# Patient Record
Sex: FEMALE | Race: WHITE | HISPANIC OR LATINO | URBAN - METROPOLITAN AREA
[De-identification: names, ages, dates, MRNs, and addresses within clinical notes are randomized per-mention and may not be internally consistent; named-entity substitution may affect disease eponyms.]

---

## 2017-06-13 ENCOUNTER — INPATIENT (INPATIENT)
Facility: HOSPITAL | Age: 67
LOS: 5 days | Discharge: NURSING, REHAB OR LONG-TERM CARE | End: 2017-06-19
Attending: INTERNAL MEDICINE

## 2017-06-13 DIAGNOSIS — R42 DIZZINESS AND GIDDINESS: ICD-10-CM

## 2017-06-20 ENCOUNTER — OUTPATIENT (OUTPATIENT)
Dept: OUTPATIENT SERVICES | Facility: HOSPITAL | Age: 67
LOS: 1 days | Discharge: HOME | End: 2017-06-20

## 2017-06-20 DIAGNOSIS — R42 DIZZINESS AND GIDDINESS: ICD-10-CM

## 2017-06-21 ENCOUNTER — OUTPATIENT (OUTPATIENT)
Dept: OUTPATIENT SERVICES | Facility: HOSPITAL | Age: 67
LOS: 1 days | Discharge: HOME | End: 2017-06-21

## 2017-06-21 DIAGNOSIS — R42 DIZZINESS AND GIDDINESS: ICD-10-CM

## 2017-06-28 DIAGNOSIS — R63.4 ABNORMAL WEIGHT LOSS: ICD-10-CM

## 2017-06-28 DIAGNOSIS — Z00.00 ENCOUNTER FOR GENERAL ADULT MEDICAL EXAMINATION WITHOUT ABNORMAL FINDINGS: ICD-10-CM

## 2017-07-03 DIAGNOSIS — R26.0 ATAXIC GAIT: ICD-10-CM

## 2017-07-03 DIAGNOSIS — Z98.51 TUBAL LIGATION STATUS: ICD-10-CM

## 2017-07-03 DIAGNOSIS — R26.9 UNSPECIFIED ABNORMALITIES OF GAIT AND MOBILITY: ICD-10-CM

## 2017-07-03 DIAGNOSIS — G93.89 OTHER SPECIFIED DISORDERS OF BRAIN: ICD-10-CM

## 2017-07-03 DIAGNOSIS — R26.2 DIFFICULTY IN WALKING, NOT ELSEWHERE CLASSIFIED: ICD-10-CM

## 2017-07-03 DIAGNOSIS — R00.2 PALPITATIONS: ICD-10-CM

## 2017-07-03 DIAGNOSIS — M48.02 SPINAL STENOSIS, CERVICAL REGION: ICD-10-CM

## 2017-07-03 DIAGNOSIS — M62.838 OTHER MUSCLE SPASM: ICD-10-CM

## 2017-07-03 DIAGNOSIS — R29.6 REPEATED FALLS: ICD-10-CM

## 2017-07-03 DIAGNOSIS — R47.1 DYSARTHRIA AND ANARTHRIA: ICD-10-CM

## 2017-07-03 DIAGNOSIS — R27.8 OTHER LACK OF COORDINATION: ICD-10-CM

## 2017-07-03 DIAGNOSIS — F17.200 NICOTINE DEPENDENCE, UNSPECIFIED, UNCOMPLICATED: ICD-10-CM

## 2017-07-03 DIAGNOSIS — I10 ESSENTIAL (PRIMARY) HYPERTENSION: ICD-10-CM

## 2017-07-03 DIAGNOSIS — F32.9 MAJOR DEPRESSIVE DISORDER, SINGLE EPISODE, UNSPECIFIED: ICD-10-CM

## 2017-07-03 DIAGNOSIS — R07.89 OTHER CHEST PAIN: ICD-10-CM

## 2018-08-24 ENCOUNTER — APPOINTMENT (OUTPATIENT)
Dept: UROLOGY | Facility: CLINIC | Age: 68
End: 2018-08-24
Payer: MEDICARE

## 2018-08-24 VITALS
WEIGHT: 175 LBS | DIASTOLIC BLOOD PRESSURE: 69 MMHG | HEIGHT: 61 IN | BODY MASS INDEX: 33.04 KG/M2 | HEART RATE: 88 BPM | SYSTOLIC BLOOD PRESSURE: 117 MMHG

## 2018-08-24 DIAGNOSIS — R39.15 URGENCY OF URINATION: ICD-10-CM

## 2018-08-24 DIAGNOSIS — R32 UNSPECIFIED URINARY INCONTINENCE: ICD-10-CM

## 2018-08-24 DIAGNOSIS — Z87.448 PERSONAL HISTORY OF OTHER DISEASES OF URINARY SYSTEM: ICD-10-CM

## 2018-08-24 DIAGNOSIS — R35.1 NOCTURIA: ICD-10-CM

## 2018-08-24 DIAGNOSIS — Z86.73 PERSONAL HISTORY OF TRANSIENT ISCHEMIC ATTACK (TIA), AND CEREBRAL INFARCTION W/OUT RESIDUAL DEFICITS: ICD-10-CM

## 2018-08-24 DIAGNOSIS — R33.9 RETENTION OF URINE, UNSPECIFIED: ICD-10-CM

## 2018-08-24 DIAGNOSIS — R39.13 SPLITTING OF URINARY STREAM: ICD-10-CM

## 2018-08-24 DIAGNOSIS — Z82.3 FAMILY HISTORY OF STROKE: ICD-10-CM

## 2018-08-24 DIAGNOSIS — Z82.49 FAMILY HISTORY OF ISCHEMIC HEART DISEASE AND OTHER DISEASES OF THE CIRCULATORY SYSTEM: ICD-10-CM

## 2018-08-24 DIAGNOSIS — R35.0 FREQUENCY OF MICTURITION: ICD-10-CM

## 2018-08-24 DIAGNOSIS — F17.200 NICOTINE DEPENDENCE, UNSPECIFIED, UNCOMPLICATED: ICD-10-CM

## 2018-08-24 PROCEDURE — 99203 OFFICE O/P NEW LOW 30 MIN: CPT

## 2018-08-24 RX ORDER — LISINOPRIL 20 MG/1
20 TABLET ORAL
Refills: 0 | Status: ACTIVE | COMMUNITY

## 2018-08-24 RX ORDER — PAROXETINE HYDROCHLORIDE 40 MG/1
40 TABLET, FILM COATED ORAL
Refills: 0 | Status: ACTIVE | COMMUNITY

## 2018-08-24 RX ORDER — ATORVASTATIN CALCIUM 80 MG/1
80 TABLET, FILM COATED ORAL
Refills: 0 | Status: ACTIVE | COMMUNITY

## 2018-08-24 NOTE — LETTER BODY
[Dear  ___] : Dear  [unfilled], [Consult Letter:] : I had the pleasure of evaluating your patient, [unfilled]. [Please see my note below.] : Please see my note below. [Consult Closing:] : Thank you very much for allowing me to participate in the care of this patient.  If you have any questions, please do not hesitate to contact me. [FreeTextEntry2] : Owen Anaya MD\par [facility] [address]\[place] [address]\par

## 2018-08-24 NOTE — PHYSICAL EXAM
[General Appearance - Well Developed] : well developed [General Appearance - Well Nourished] : well nourished [Normal Appearance] : normal appearance [Well Groomed] : well groomed [General Appearance - In No Acute Distress] : no acute distress [Heart Rate And Rhythm] : Heart rate and rhythm were normal [] : no respiratory distress [Respiration, Rhythm And Depth] : normal respiratory rhythm and effort [Exaggerated Use Of Accessory Muscles For Inspiration] : no accessory muscle use [Auscultation Breath Sounds / Voice Sounds] : lungs were clear to auscultation bilaterally [Abdomen Soft] : soft [Abdomen Tenderness] : non-tender [Costovertebral Angle Tenderness] : no ~M costovertebral angle tenderness [FreeTextEntry1] : weak right side, slow speech and mentation [Oriented To Time, Place, And Person] : oriented to person, place, and time [Affect] : the affect was normal [Mood] : the mood was normal [Not Anxious] : not anxious

## 2018-08-24 NOTE — HISTORY OF PRESENT ILLNESS
[FreeTextEntry1] : Allyn is a 67-year-old female who is been having mini strokes of the last five years. For at least a year she’s been having severe lower urinary tract symptoms with incomplete emptying, frequency, intermittency, urgency, slow stream, straining, all equal to a five waking up at least four times per night this is made a very unhappy. This gives her an AUA symptom score of 25/4/5. She’s of the janneth per void is small and that she definitely knows the urine is about to come out but either due to instability and/or her limited mobility because of her right-sided weakness she just can’t get to the bathroom. She is not spoken to anyone about this before except her gynecologist who she saw several months ago. At that point she was told the pelvic floor looks normal and that she should see a urologist.\par \par She has comorbidities with a stroke as the main issue. She is  “she wouldn’t tell me”, para three some assuming at some point she has some terminations of pregnancy or just decided not to discuss it. They were all vaginal deliveries and she said various surgeries none of which should be impinging on her urinary abilities or lack thereof.\par  [Urinary Incontinence] : urinary incontinence [Urinary Urgency] : urinary urgency [Urinary Frequency] : urinary frequency [Nocturia] : nocturia [Straining] : straining [Weak Stream] : weak stream [Intermittency] : intermittency [Dysuria] : no dysuria [Hematuria - Gross] : no gross hematuria [Hematuria - Microscopic] : no microscopic hematuria

## 2018-08-24 NOTE — LETTER HEADER
[FreeTextEntry3] : Teo Rhodes M.D.\par Director of Urology\par [facility]\par [address] [place] [address], [address]\[place] [address]

## 2018-08-24 NOTE — ASSESSMENT
[FreeTextEntry1] : Her exam shows her mentation to be slightly slowed. Her speech is clear but her responses are slowed. She had severe trouble getting up on the table due to right-sided weakness. There is no palpable bladder she has mild peripheral edema and I don’t know whether this is poor bladder function, overactive bladder, dyssynergia, or a combination thereof. Going to want to record of her intake and output which might be difficult but perhaps possible with the top-end and some help from her friends. Going to what urine for analysis culture and cytology again a clean-catch may be difficult but will see what we get if necessary do a straight cath. Will send it for a renal and bladder ultrasound to make sure that she is emptying well enough and that there are no signs of obstructive uropathy when she comes in we review the data and consider a flow study. I may consider referral to Dr. Joanie Grant if the diagnosis is not obvious and if she may have pelvic floor dysfunction and possibly need something along the lines of nerve stimulation she tells me she has glaucoma she’s not sure if it’s never open and if it is narrow anticholinergics would be relatively contraindicated

## 2018-08-30 ENCOUNTER — TRANSCRIPTION ENCOUNTER (OUTPATIENT)
Age: 68
End: 2018-08-30

## 2018-09-12 ENCOUNTER — APPOINTMENT (OUTPATIENT)
Dept: UROLOGY | Facility: CLINIC | Age: 68
End: 2018-09-12

## 2018-09-26 ENCOUNTER — APPOINTMENT (OUTPATIENT)
Dept: UROLOGY | Facility: CLINIC | Age: 68
End: 2018-09-26

## 2019-01-11 ENCOUNTER — OUTPATIENT (OUTPATIENT)
Dept: OUTPATIENT SERVICES | Facility: HOSPITAL | Age: 69
LOS: 1 days | Discharge: HOME | End: 2019-01-11

## 2019-01-11 DIAGNOSIS — N39.0 URINARY TRACT INFECTION, SITE NOT SPECIFIED: ICD-10-CM

## 2019-01-28 ENCOUNTER — OUTPATIENT (OUTPATIENT)
Dept: OUTPATIENT SERVICES | Facility: HOSPITAL | Age: 69
LOS: 1 days | Discharge: HOME | End: 2019-01-28

## 2019-01-28 DIAGNOSIS — R79.9 ABNORMAL FINDING OF BLOOD CHEMISTRY, UNSPECIFIED: ICD-10-CM

## 2019-01-28 DIAGNOSIS — N39.0 URINARY TRACT INFECTION, SITE NOT SPECIFIED: ICD-10-CM

## 2019-01-28 DIAGNOSIS — D64.9 ANEMIA, UNSPECIFIED: ICD-10-CM

## 2020-01-21 ENCOUNTER — INPATIENT (INPATIENT)
Facility: HOSPITAL | Age: 70
LOS: 9 days | Discharge: NURSING, REHAB OR LONG-TERM CARE | End: 2020-01-31
Attending: HOSPITALIST | Admitting: HOSPITALIST
Payer: MEDICARE

## 2020-01-21 VITALS
TEMPERATURE: 98 F | RESPIRATION RATE: 16 BRPM | DIASTOLIC BLOOD PRESSURE: 71 MMHG | SYSTOLIC BLOOD PRESSURE: 137 MMHG | HEART RATE: 90 BPM | OXYGEN SATURATION: 99 %

## 2020-01-21 LAB
ALBUMIN SERPL ELPH-MCNC: 4.7 G/DL — SIGNIFICANT CHANGE UP (ref 3.5–5.2)
ALP SERPL-CCNC: 107 U/L — SIGNIFICANT CHANGE UP (ref 30–115)
ALT FLD-CCNC: 16 U/L — SIGNIFICANT CHANGE UP (ref 0–41)
ANION GAP SERPL CALC-SCNC: 15 MMOL/L — HIGH (ref 7–14)
APPEARANCE UR: CLEAR — SIGNIFICANT CHANGE UP
APTT BLD: 30 SEC — SIGNIFICANT CHANGE UP (ref 27–39.2)
AST SERPL-CCNC: 21 U/L — SIGNIFICANT CHANGE UP (ref 0–41)
BACTERIA # UR AUTO: ABNORMAL
BASOPHILS # BLD AUTO: 0.03 K/UL — SIGNIFICANT CHANGE UP (ref 0–0.2)
BASOPHILS NFR BLD AUTO: 0.4 % — SIGNIFICANT CHANGE UP (ref 0–1)
BILIRUB SERPL-MCNC: 0.6 MG/DL — SIGNIFICANT CHANGE UP (ref 0.2–1.2)
BILIRUB UR-MCNC: NEGATIVE — SIGNIFICANT CHANGE UP
BUN SERPL-MCNC: 24 MG/DL — HIGH (ref 10–20)
CALCIUM SERPL-MCNC: 10.1 MG/DL — SIGNIFICANT CHANGE UP (ref 8.5–10.1)
CHLORIDE SERPL-SCNC: 98 MMOL/L — SIGNIFICANT CHANGE UP (ref 98–110)
CO2 SERPL-SCNC: 26 MMOL/L — SIGNIFICANT CHANGE UP (ref 17–32)
COLOR SPEC: YELLOW — SIGNIFICANT CHANGE UP
CREAT SERPL-MCNC: 1 MG/DL — SIGNIFICANT CHANGE UP (ref 0.7–1.5)
DIFF PNL FLD: NEGATIVE — SIGNIFICANT CHANGE UP
EOSINOPHIL # BLD AUTO: 0.16 K/UL — SIGNIFICANT CHANGE UP (ref 0–0.7)
EOSINOPHIL NFR BLD AUTO: 2.1 % — SIGNIFICANT CHANGE UP (ref 0–8)
EPI CELLS # UR: 0 /HPF — SIGNIFICANT CHANGE UP (ref 0–5)
GLUCOSE SERPL-MCNC: 103 MG/DL — HIGH (ref 70–99)
GLUCOSE UR QL: NEGATIVE — SIGNIFICANT CHANGE UP
HCT VFR BLD CALC: 38.7 % — SIGNIFICANT CHANGE UP (ref 37–47)
HGB BLD-MCNC: 13 G/DL — SIGNIFICANT CHANGE UP (ref 12–16)
HYALINE CASTS # UR AUTO: 0 /LPF — SIGNIFICANT CHANGE UP (ref 0–7)
IMM GRANULOCYTES NFR BLD AUTO: 0.4 % — HIGH (ref 0.1–0.3)
INR BLD: 1.03 RATIO — SIGNIFICANT CHANGE UP (ref 0.65–1.3)
KETONES UR-MCNC: NEGATIVE — SIGNIFICANT CHANGE UP
LEUKOCYTE ESTERASE UR-ACNC: ABNORMAL
LYMPHOCYTES # BLD AUTO: 2.15 K/UL — SIGNIFICANT CHANGE UP (ref 1.2–3.4)
LYMPHOCYTES # BLD AUTO: 28.3 % — SIGNIFICANT CHANGE UP (ref 20.5–51.1)
MCHC RBC-ENTMCNC: 31.3 PG — HIGH (ref 27–31)
MCHC RBC-ENTMCNC: 33.6 G/DL — SIGNIFICANT CHANGE UP (ref 32–37)
MCV RBC AUTO: 93 FL — SIGNIFICANT CHANGE UP (ref 81–99)
MONOCYTES # BLD AUTO: 0.59 K/UL — SIGNIFICANT CHANGE UP (ref 0.1–0.6)
MONOCYTES NFR BLD AUTO: 7.8 % — SIGNIFICANT CHANGE UP (ref 1.7–9.3)
NEUTROPHILS # BLD AUTO: 4.65 K/UL — SIGNIFICANT CHANGE UP (ref 1.4–6.5)
NEUTROPHILS NFR BLD AUTO: 61 % — SIGNIFICANT CHANGE UP (ref 42.2–75.2)
NITRITE UR-MCNC: NEGATIVE — SIGNIFICANT CHANGE UP
NRBC # BLD: 0 /100 WBCS — SIGNIFICANT CHANGE UP (ref 0–0)
PH UR: 5.5 — SIGNIFICANT CHANGE UP (ref 5–8)
PLATELET # BLD AUTO: 295 K/UL — SIGNIFICANT CHANGE UP (ref 130–400)
POTASSIUM SERPL-MCNC: 4.4 MMOL/L — SIGNIFICANT CHANGE UP (ref 3.5–5)
POTASSIUM SERPL-SCNC: 4.4 MMOL/L — SIGNIFICANT CHANGE UP (ref 3.5–5)
PROT SERPL-MCNC: 7.6 G/DL — SIGNIFICANT CHANGE UP (ref 6–8)
PROT UR-MCNC: SIGNIFICANT CHANGE UP
PROTHROM AB SERPL-ACNC: 11.8 SEC — SIGNIFICANT CHANGE UP (ref 9.95–12.87)
RBC # BLD: 4.16 M/UL — LOW (ref 4.2–5.4)
RBC # FLD: 12.9 % — SIGNIFICANT CHANGE UP (ref 11.5–14.5)
RBC CASTS # UR COMP ASSIST: 0 /HPF — SIGNIFICANT CHANGE UP (ref 0–4)
SODIUM SERPL-SCNC: 139 MMOL/L — SIGNIFICANT CHANGE UP (ref 135–146)
SP GR SPEC: 1.02 — SIGNIFICANT CHANGE UP (ref 1.01–1.02)
UROBILINOGEN FLD QL: SIGNIFICANT CHANGE UP
WBC # BLD: 7.61 K/UL — SIGNIFICANT CHANGE UP (ref 4.8–10.8)
WBC # FLD AUTO: 7.61 K/UL — SIGNIFICANT CHANGE UP (ref 4.8–10.8)
WBC UR QL: 1 /HPF — SIGNIFICANT CHANGE UP (ref 0–5)

## 2020-01-21 PROCEDURE — 99285 EMERGENCY DEPT VISIT HI MDM: CPT

## 2020-01-21 PROCEDURE — 72125 CT NECK SPINE W/O DYE: CPT | Mod: 26

## 2020-01-21 PROCEDURE — 73610 X-RAY EXAM OF ANKLE: CPT | Mod: 26,RT

## 2020-01-21 PROCEDURE — 99283 EMERGENCY DEPT VISIT LOW MDM: CPT

## 2020-01-21 PROCEDURE — 70450 CT HEAD/BRAIN W/O DYE: CPT | Mod: 26

## 2020-01-21 PROCEDURE — 71260 CT THORAX DX C+: CPT | Mod: 26

## 2020-01-21 PROCEDURE — 74177 CT ABD & PELVIS W/CONTRAST: CPT | Mod: 26

## 2020-01-21 PROCEDURE — 73630 X-RAY EXAM OF FOOT: CPT | Mod: 26,RT

## 2020-01-21 RX ORDER — ACETAMINOPHEN 500 MG
975 TABLET ORAL ONCE
Refills: 0 | Status: COMPLETED | OUTPATIENT
Start: 2020-01-21 | End: 2020-01-21

## 2020-01-21 RX ORDER — SODIUM CHLORIDE 9 MG/ML
500 INJECTION INTRAMUSCULAR; INTRAVENOUS; SUBCUTANEOUS ONCE
Refills: 0 | Status: COMPLETED | OUTPATIENT
Start: 2020-01-21 | End: 2020-01-21

## 2020-01-21 RX ORDER — DIAZEPAM 5 MG
5 TABLET ORAL ONCE
Refills: 0 | Status: DISCONTINUED | OUTPATIENT
Start: 2020-01-21 | End: 2020-01-21

## 2020-01-21 RX ADMIN — Medication 975 MILLIGRAM(S): at 20:08

## 2020-01-21 RX ADMIN — SODIUM CHLORIDE 250 MILLILITER(S): 9 INJECTION INTRAMUSCULAR; INTRAVENOUS; SUBCUTANEOUS at 22:37

## 2020-01-21 RX ADMIN — Medication 5 MILLIGRAM(S): at 20:08

## 2020-01-21 NOTE — H&P ADULT - HISTORY OF PRESENT ILLNESS
69 female hx of HTN, DM, CVA with right sided weakness present c/o right sided body pain and unable to ambulate after fall one hour prior to presentation.   patient tried to get off the bed without her walker and she fell on her back. patient complains of back and right ankle pain. denies head injury and LOC. grand aughter witnessed the fall. she is refusing to walk after the fall, family brought her to ED.   Patient had history of multiple falls in the past but didn't come to hospital for evaluation.  As per family, patient used to have 24 hours aid at [facility] and moved back to [place] recently.  Denies Chest pain SOB nausea vomiting or urinary symptoms   In ED she was hemodynamically stable , trauma work up were all negative, admitted for difficulty ambulating

## 2020-01-21 NOTE — H&P ADULT - NSHPLABSRESULTS_GEN_ALL_CORE
13.0   7.61  )-----------( 295      ( 2020 20:05 )             38.7           139  |  98  |  24<H>  ----------------------------<  103<H>  4.4   |  26  |  1.0    Ca    10.1      2020 20:05    TPro  7.6  /  Alb  4.7  /  TBili  0.6  /  DBili  x   /  AST  21  /  ALT  16  /  AlkPhos  107                Urinalysis Basic - ( 2020 22:20 )    Color: Yellow / Appearance: Clear / S.022 / pH: x  Gluc: x / Ketone: Negative  / Bili: Negative / Urobili: <2 mg/dL   Blood: x / Protein: Trace / Nitrite: Negative   Leuk Esterase: Large / RBC: 0 /HPF / WBC 1 /HPF   Sq Epi: x / Non Sq Epi: 0 /HPF / Bacteria: Many        PT/INR - ( 2020 20:05 )   PT: 11.80 sec;   INR: 1.03 ratio         PTT - ( 2020 20:05 )  PTT:30.0 sec    Lactate Trend            CAPILLARY BLOOD GLUCOSE            < from: CT Abdomen and Pelvis w/ IV Cont (20 @ 21:35) >    IMPRESSION:   No evidence of acute traumatic intrathoracic or intra-abdominal pathology.    < end of copied text >    < from: CT Chest w/ IV Cont (20 @ 21:33) >    IMPRESSION:   No evidence of acute traumatic intrathoracic or intra-abdominal pathology.    < end of copied text >    < from: CT Cervical Spine No Cont (20 @ 21:32) >    No evidence of acute fracture or subluxation of the cervical spine.      CT Head No Cont (20 @ 21:32) >      IMPRESSION:     Parenchymal atrophy compatible with the patient's age  No CT evidence of acute fracture, intracranial hemorrhage, midline shift, or mass effect.

## 2020-01-21 NOTE — ED PROVIDER NOTE - CARE PLAN
Principal Discharge DX:	Unable to ambulate  Secondary Diagnosis:	Frequent falls Principal Discharge DX:	Unable to ambulate  Secondary Diagnosis:	Frequent falls  Secondary Diagnosis:	UTI (urinary tract infection)

## 2020-01-21 NOTE — H&P ADULT - ASSESSMENT
69 female hx of HTN, DM, CVA with right sided weakness present c/o right sided body pain and unable to ambulate after fall one hour prior to presentation. 69 female hx of HTN, DM, CVA with right sided weakness present c/o right sided body pain and unable to ambulate after fall one hour prior to presentation.     # S/P Mechanical fall. H/O recurrent fall and functional decline  - trauma work up is negative.   - difficulty ambulating and recurrent falls   - PT / rehab for placement     # HTN c/w lisinopril and HCTZ  # H/O CVA c/w statin     # DASH Diet   # DVT PPX  # Full code

## 2020-01-21 NOTE — ED PROVIDER NOTE - CLINICAL SUMMARY MEDICAL DECISION MAKING FREE TEXT BOX
Elderly female with pmh of stroke, and dementia, ? expressive aphasia presents to ER for fall PTA. Pain to right foot/ankle, unable to bear weight. Labs, CT scans, Xrays reviewed. Pt to be admitted and will need social work eval and PT eval for placement issues.

## 2020-01-21 NOTE — ED ADULT TRIAGE NOTE - CHIEF COMPLAINT QUOTE
S/p fall today while trying to get out of wheelchair. Pt had stroke in the past and has residual right side weakness. C/o back and right leg pain. denies head trauma. Does not take blood thinners. Pt is at baseline mental status as per family.

## 2020-01-21 NOTE — ED ADULT NURSE NOTE - OBJECTIVE STATEMENT
pt s/p fall while getting into wheelchair today. denies any numbness/tingling. pt had cva in past with right sided weakness at baseline. denies any abrasion/laceration. denies loc. denies ac. pt alert and oriented . denies pain at this time. pt s/p fall while getting back into wheelchair today. denies any numbness/tingling. pt had cva in past with right sided weakness at baseline. denies any abrasion/laceration. denies loc. denies ac. pt alert and oriented with baseline garbled speech. pt c.o back pain and right foot pain. pt right foot is curved at baseline and pt normally stays in wheelchair chronically.

## 2020-01-21 NOTE — H&P ADULT - NSHPPHYSICALEXAM_GEN_ALL_CORE
GENERAL: NAD, lying in bed comfortably  HEAD:  Atraumatic, Normocephalic  EYES: EOMI, PERRLA, conjunctiva and sclera clear  ENT: Moist mucous membranes  CHEST/LUNG: Clear to auscultation bilaterally;  HEART: normal s1 s2  ABDOMEN: Soft, Nontender, Nondistended. No hepatomegally  EXTREMITIES:  No clubbing, cyanosis, or edema  NERVOUS SYSTEM:  Alert & Oriented X2, speech clear.  MSK: FROM all 4 extremities, full and equal strength  SKIN: No rashes or lesions

## 2020-01-21 NOTE — H&P ADULT - ATTENDING COMMENTS
A 68 yo female with PMH of HTN, DM, CVA with right sided weakness came to ED c/o back pain after fall. Patient fell of the bed on her back, no head injuries. No loss of consciousness. She reports chronic back pain and multiple falls. She uses walker. She lives with her sister and has no help.   In ED she was hemodynamically stable , trauma work up were all negative, admitted for difficulty ambulating    PHYSICAL EXAM:  GENERAL: NAD, well-developed  HEAD:  Atraumatic, Normocephalic  EYES: EOMI, PERRLA, conjunctiva and sclera clear  NECK: Supple, No JVD  CHEST/LUNG: Clear to auscultation bilaterally; No wheeze  HEART: Regular rate and rhythm; No murmurs, rubs, or gallops  ABDOMEN: Soft, Nontender, Nondistended; Bowel sounds present  EXTREMITIES:  2+ Peripheral Pulses, No clubbing, cyanosis, or edema  PSYCH: AAOx3    A/P:   Multiple falls: Likely due to previous CVA  Trauma workup is negative for acute fracture or bleeding.   PT and rehab evaluation    Lower back pain:   Mild tenderness on exam, Ct abdomen and Pelvis showed no fracture.   Percocet prn.     CVA  Continue Lipitor. Add Aspirin.     HTN: continue Lisinopril and HCTZ    #Progress Note Handoff:  Pending (specify):  Rehab, placement.  Family discussion: with patient.  Disposition: Home vs SNF

## 2020-01-21 NOTE — ED ADULT NURSE NOTE - NSIMPLEMENTINTERV_GEN_ALL_ED
Implemented All Fall with Harm Risk Interventions:  [place] to call system. Call bell, personal items and telephone within reach. Instruct patient to call for assistance. Room bathroom lighting operational. Non-slip footwear when patient is off stretcher. Physically safe environment: no spills, clutter or unnecessary equipment. Stretcher in lowest position, wheels locked, appropriate side rails in place. Provide visual cue, wrist band, yellow gown, etc. Monitor gait and stability. Monitor for mental status changes and reorient to person, place, and time. Review medications for side effects contributing to fall risk. Reinforce activity limits and safety measures with patient and family. Provide visual clues: red socks.

## 2020-01-21 NOTE — ED PROVIDER NOTE - PROGRESS NOTE DETAILS
attempted to walk patient and refused to walk. son also reports difficulty to care for patient. will admit for possible placement and rehab [phone]

## 2020-01-21 NOTE — ED PROVIDER NOTE - NS ED ROS FT
Constitutional: no fever, chills, no recent weight loss, change in appetite or malaise  Eyes: no redness/discharge/pain/vision changes  ENT: no rhinorrhea/ear pain/sore throat  Cardiac: No chest pain, SOB or edema.  Respiratory: No cough or respiratory distress  GI: No nausea, vomiting, diarrhea or abdominal pain.  : No dysuria, frequency, urgency or hematuria  MS: see HPI  Neuro: No headache or weakness. No LOC.  Skin: No skin rash.  Endocrine: + borderline diabetes.

## 2020-01-21 NOTE — ED PROVIDER NOTE - ATTENDING CONTRIBUTION TO CARE
70 yo female with pmh of CVA with residual mild weakness to right side, htn, dementia presents to the ER for fall out of bed about an hour PTA. Pt living in [place] now, and does not have a 24 hour aide as she did in [place] (where she used to live). Pt really has been unable to use the walker she was given for some time per family and today when she attempted to get out of bed, her grand daughter witnessed her slide down and fall onto her back. No LOC. NO anticoagulants. Pt complaining of pain to the right foot/ankle and unable to bear weight on it. Son states she really is not walking much even prior to fall and very unsteady and whole family here are unable to really care for her and her needs. Pt has not been complaining of any thing else (no N/V/CP/belly pain). No fever. No rash. ON exam +mild tenderness to right dorsum of foot and ankle (medial and sometimes lateral). No bony deformity. Pt is actually able to range and move all extremities well, but starts screaming that she has pain and raises the RLE, and at one point just started to scream and when discussed need for possible long term placement for patient. Remainder of exam as per PA note. Will [place] scan, xray right foot and ankle, labs, urine and admit as pt can not walk and family can not care for her at home at present    ALL: NKDA  Meds: linopril 10mg, Paroxetine 40 mg, atorvastatin 40, HCTZ 25 mg, Donepezil 5 mg

## 2020-01-21 NOTE — ED PROVIDER NOTE - OBJECTIVE STATEMENT
69 female hx of HTN/borderline DM/ CVA with right sided weakness present c/o right sided body pain and unable to ambulate since the fall 1 hour PTA. as per family, patient used to have 24 hours aid at [facility] and moved back to [place] recently. Patient had multiple times in the past but didn't come to hospital for evaluation. patient came to ED because patient refused to walk since the fall tonight. grand daughter witnessed the fall. patient tried to get off the bed without her walker and she fell on her back. patient complains of back and right ankle pain. denies head injury and LOC. denies chest pain and abd pain/n/v/d.

## 2020-01-21 NOTE — ED PROVIDER NOTE - PHYSICAL EXAMINATION
CONSTITUTIONAL: Well-appearing; well-nourished; in no apparent distress.   EYES: PERRL; EOM intact.   ENT: normal nose; no rhinorrhea; normal pharynx with no tonsillar hypertrophy.   NECK: Supple; non-tender; no cervical lymphadenopathy. No JVD.   CARDIOVASCULAR: Normal S1, S2; no murmurs, rubs, or gallops.   RESPIRATORY: Normal chest excursion with respiration; breath sounds clear and equal bilaterally; no wheezes, rhonchi, or rales.  GI/: Normal bowel sounds; non-distended; non-tender; no palpable organomegaly.   MS: No midline tenderness to neck/back. + mild medial aspect right ankle tenderness. right ankle/foot with FROM> right foot n/v intact.   SKIN: Normal for age and race; warm; dry; good turgor; no apparent lesions or exudate.   NEURO/PSYCH: A & O x 2 disoriented to year, family reported baseline mental status; move all extremities right side arm/leg with mild weakness comparing to to the left. sensation intact CN 2-12 grossly intact. refused to stand or walk.

## 2020-01-22 LAB
GLUCOSE BLDC GLUCOMTR-MCNC: 113 MG/DL — HIGH (ref 70–99)
HCV AB S/CO SERPL IA: 0.14 S/CO — SIGNIFICANT CHANGE UP (ref 0–0.99)
HCV AB SERPL-IMP: SIGNIFICANT CHANGE UP

## 2020-01-22 PROCEDURE — 99222 1ST HOSP IP/OBS MODERATE 55: CPT | Mod: AI

## 2020-01-22 RX ORDER — CHLORHEXIDINE GLUCONATE 213 G/1000ML
1 SOLUTION TOPICAL
Refills: 0 | Status: DISCONTINUED | OUTPATIENT
Start: 2020-01-22 | End: 2020-01-31

## 2020-01-22 RX ORDER — HYDROCHLOROTHIAZIDE 25 MG
25 TABLET ORAL DAILY
Refills: 0 | Status: DISCONTINUED | OUTPATIENT
Start: 2020-01-22 | End: 2020-01-31

## 2020-01-22 RX ORDER — CEFTRIAXONE 500 MG/1
1000 INJECTION, POWDER, FOR SOLUTION INTRAMUSCULAR; INTRAVENOUS ONCE
Refills: 0 | Status: COMPLETED | OUTPATIENT
Start: 2020-01-22 | End: 2020-01-22

## 2020-01-22 RX ORDER — ATORVASTATIN CALCIUM 80 MG/1
40 TABLET, FILM COATED ORAL AT BEDTIME
Refills: 0 | Status: DISCONTINUED | OUTPATIENT
Start: 2020-01-22 | End: 2020-01-31

## 2020-01-22 RX ORDER — DONEPEZIL HYDROCHLORIDE 10 MG/1
5 TABLET, FILM COATED ORAL AT BEDTIME
Refills: 0 | Status: DISCONTINUED | OUTPATIENT
Start: 2020-01-22 | End: 2020-01-31

## 2020-01-22 RX ORDER — LISINOPRIL 2.5 MG/1
10 TABLET ORAL DAILY
Refills: 0 | Status: DISCONTINUED | OUTPATIENT
Start: 2020-01-22 | End: 2020-01-31

## 2020-01-22 RX ORDER — ENOXAPARIN SODIUM 100 MG/ML
40 INJECTION SUBCUTANEOUS DAILY
Refills: 0 | Status: DISCONTINUED | OUTPATIENT
Start: 2020-01-22 | End: 2020-01-31

## 2020-01-22 RX ORDER — TRAMADOL HYDROCHLORIDE 50 MG/1
25 TABLET ORAL EVERY 8 HOURS
Refills: 0 | Status: DISCONTINUED | OUTPATIENT
Start: 2020-01-22 | End: 2020-01-24

## 2020-01-22 RX ADMIN — LISINOPRIL 10 MILLIGRAM(S): 2.5 TABLET ORAL at 05:39

## 2020-01-22 RX ADMIN — Medication 40 MILLIGRAM(S): at 13:35

## 2020-01-22 RX ADMIN — DONEPEZIL HYDROCHLORIDE 5 MILLIGRAM(S): 10 TABLET, FILM COATED ORAL at 21:52

## 2020-01-22 RX ADMIN — ENOXAPARIN SODIUM 40 MILLIGRAM(S): 100 INJECTION SUBCUTANEOUS at 13:36

## 2020-01-22 RX ADMIN — CEFTRIAXONE 100 MILLIGRAM(S): 500 INJECTION, POWDER, FOR SOLUTION INTRAMUSCULAR; INTRAVENOUS at 01:13

## 2020-01-22 RX ADMIN — Medication 25 MILLIGRAM(S): at 05:38

## 2020-01-22 RX ADMIN — ATORVASTATIN CALCIUM 40 MILLIGRAM(S): 80 TABLET, FILM COATED ORAL at 21:52

## 2020-01-22 NOTE — PATIENT PROFILE ADULT - NSPROMUTANXFEARADDRESSFT_GEN_A_NUR
Post-Care Instructions: I reviewed with the patient in detail post-care instructions. Patient is to wear sunprotection, and avoid picking at any of the treated lesions. Pt may apply Vaseline to crusted or scabbing areas. Render Post-Care Instructions In Note?: no Include Z78.9 (Other Specified Conditions Influencing Health Status) As An Associated Diagnosis?: Yes Duration Of Freeze Thaw-Cycle (Seconds): 5-10 Detail Level: Detailed Consent: The patient's consent was obtained including but not limited to risks of crusting, scabbing, blistering, scarring, darker or lighter pigmentary change, recurrence, incomplete removal and infection. Medical Necessity Clause: This procedure was medically necessary because the lesions that were treated were: Medical Necessity Information: It is in your best interest to select a reason for this procedure from the list below. All of these items fulfill various CMS LCD requirements except the new and changing color options. Number Of Freeze-Thaw Cycles: 1 freeze-thaw cycle none

## 2020-01-22 NOTE — CONSULT NOTE ADULT - ASSESSMENT
IMPRESSION: Rehab of Debilitation falls Old CVA    PRECAUTIONS: [    ] Cardiac  [    ] Respiratory  [    ] Seizures [    ] Contact Isolation  [    ] Droplet Isolation  [    ] Other    Weight Bearing Status:     RECOMMENDATION:    Out of Bed to Chair     DVT/Decubiti Prophylaxis    REHAB PLAN:     [   x  ] Bedside P/T 3-5 times a week   [     ] Bedside O/T  2-3 times a week   [     ] No Rehab Therapy Indicated   [     ]  Speech Therapy   Conditioning/ROM                                 ADL  Bed Mobility                                            Conditioning/ROM  Transfers                                                  Bed Mobility  Sitting /Standing Balance                      Transfers                                        Gait Training                                            Sitting/Standing Balance  Stair Training [   ]Applicable                 Home equipment Eval                                                                     Splinting  [   ] Only      GOALS:   ADL   [    x]   Independent         Transfers  [ x   ] Independent            Ambulation  [x     ] Independent     [   x  ] With device                            [    ]  CG                                               [    ]  CG                                                    [     ] CG                            [    ] Min A                                          [    ] Min A                                                [     ] Min  A          DISCHARGE PLAN:   [     ]  Good candidate for Intensive Rehabilitation/Hospital based                                             Will tolerate 3hrs Intensive Rehab Daily                                       [   x   ]  Short Term Rehab in Skilled Nursing Facility                                       [      ]  Home with Outpatient or  services                                         [      ]  Possible Candidate for Intensive Hospital based Rehab

## 2020-01-22 NOTE — CONSULT NOTE ADULT - SUBJECTIVE AND OBJECTIVE BOX
Patient is a 69y old  Female who presents with a chief complaint of fall (2020 23:35)    HPI:  69 female hx of HTN, DM, CVA with right sided weakness present c/o right sided body pain and unable to ambulate after fall one hour prior to presentation.   patient tried to get off the bed without her walker and she fell on her back. patient complains of back and right ankle pain. denies head injury and LOC. grand aughter witnessed the fall. she is refusing to walk after the fall, family brought her to ED.   Patient had history of multiple falls in the past but didn't come to hospital for evaluation.  As per family, patient used to have 24 hours aid at [facility] and moved back to [place] recently.  Denies Chest pain SOB nausea vomiting or urinary symptoms   In ED she was hemodynamically stable , trauma work up were all negative, admitted for difficulty ambulating (2020 23:35)      PAST MEDICAL & SURGICAL HISTORY:  HTN (hypertension)  CVA (cerebral vascular accident)  No significant past surgical history      Hospital Course:  S/P Mechanical fall. H/O recurrent fall and functional decline  - trauma work up is negative.   - difficulty ambulating and recurrent falls   - PT / rehab for placement     # HTN c/w lisinopril and HCTZ  # H/O CVA c/w statin     # DASH Diet   # DVT PPX  # Full code     TODAY'S SUBJECTIVE & REVIEW OF SYMPTOMS:     Constitutional WNL   Cardio WNL   Resp WNL   GI WNL  Heme WNL  Endo WNL  Skin WNL  MSK WNL  Neuro WNL  Cognitive WNL  Psych WNL  +incontinence    MEDICATIONS  (STANDING):  atorvastatin 40 milliGRAM(s) Oral at bedtime  chlorhexidine 4% Liquid 1 Application(s) Topical <User Schedule>  donepezil 5 milliGRAM(s) Oral at bedtime  enoxaparin Injectable 40 milliGRAM(s) SubCutaneous daily  hydrochlorothiazide 25 milliGRAM(s) Oral daily  lisinopril 10 milliGRAM(s) Oral daily  PARoxetine 40 milliGRAM(s) Oral daily    MEDICATIONS  (PRN):      FAMILY HISTORY:      Allergies    No Known Allergies    Intolerances        SOCIAL HISTORY:    [    ] Etoh  [    ] Smoking  [    ] Substance abuse     Home Environment:  [    ] Home Alone  [   x ] Lives with Family SISTER  [    ] Home Health Aid    Dwelling:  [  x  ] Apartment  [    ] Private House  [    ] Adult Home  [    ] Skilled Nursing Facility      [    ] Short Term  [    ] Long Term  [    ] Stairs                           [ x   ] Elevator     FUNCTIONAL STATUS PTA: (Check all that apply)  Ambulation: [   x  ]Independent    [    ] Dependent     [    ] Non-Ambulatory  Assistive Device: [    ] SA Cane  [    ]  Q Cane  [  x  ] Walker  [    ]  Wheelchair  ADL : [    ] Independent  [  x  ]  Dependent       Vital Signs Last 24 Hrs  T(C): 36.2 (2020 07:58), Max: 36.7 (2020 18:21)  T(F): 97.2 (2020 07:58), Max: 98 (2020 18:21)  HR: 70 (2020 07:58) (62 - 90)  BP: 114/59 (2020 07:58) (110/55 - 137/71)  BP(mean): --  RR: 18 (2020 07:58) (16 - 18)  SpO2: 97% (2020 07:58) (97% - 99%)      PHYSICAL EXAM: Alert & Oriented X3  GENERAL: NAD, well-groomed, well-developed  HEAD:  Atraumatic, Normocephalic  EYES: EOMI, PERRLA, conjunctiva and sclera clear  NECK: Supple  CHEST/LUNG: Clear bilaterally  HEART: Regular rate and rhythm  ABDOMEN: Soft, Nontender, Nondistended; Bowel sounds present  EXTREMITIES:  no calf tenderness,no edema BLES    NERVOUS SYSTEM:  Cranial Nerves 2-12 intact [  x  ] Abnormal  [    ]  ROM: WFL all extremities [ x   ]  Abnormal [     ]  Motor Strength: WFL all extremities  [  x  ]  Abnormal [    ] MILD WEAKNESS R  Sensation: intact to light touch [  x  ] Abnormal [    ]  INCREASED TONE EXTENSORS RLE    FUNCTIONAL STATUS:  Bed Mobility: [   ]  Independent [    ]  Supervision [ x   ]  Needs Assistance [  ]  N/A  Transfers: [    ]  Independent [    ]  Supervision [ x   ]  Needs Assistance [    ]  N/A    Ambulation:  [    ]  Independent [    ]  Supervision [    ]  Needs Assistance [ x   ]  N/A   ADL:  [    ]   Independent [  x  ] Requires Assistance [    ] N/A       LABS:                        13.0   7.61  )-----------( 295      ( 2020 20:05 )             38.7     01-21    139  |  98  |  24<H>  ----------------------------<  103<H>  4.4   |  26  |  1.0    Ca    10.1      2020 20:05    TPro  7.6  /  Alb  4.7  /  TBili  0.6  /  DBili  x   /  AST  21  /  ALT  16  /  AlkPhos  107  -    PT/INR - ( 2020 20:05 )   PT: 11.80 sec;   INR: 1.03 ratio         PTT - ( 2020 20:05 )  PTT:30.0 sec  Urinalysis Basic - ( 2020 22:20 )    Color: Yellow / Appearance: Clear / S.022 / pH: x  Gluc: x / Ketone: Negative  / Bili: Negative / Urobili: <2 mg/dL   Blood: x / Protein: Trace / Nitrite: Negative   Leuk Esterase: Large / RBC: 0 /HPF / WBC 1 /HPF   Sq Epi: x / Non Sq Epi: 0 /HPF / Bacteria: Many        RADIOLOGY & ADDITIONAL STUDIES:

## 2020-01-22 NOTE — PHYSICAL THERAPY INITIAL EVALUATION ADULT - IMPAIRMENTS FOUND, PT EVAL
aerobic capacity/endurance/cognitive impairment/arousal, attention, and cognition/gait, locomotion, and balance

## 2020-01-22 NOTE — PHYSICAL THERAPY INITIAL EVALUATION ADULT - ADDITIONAL COMMENTS
Patient is a poor historian. Social history taken from patient's chart. Patient was assisted in ADL's and ambulation. Has HHA in [place]. Lives with daughter in private home. As per daughter, patient fell back to bed when attempting to stand

## 2020-01-22 NOTE — PHYSICAL THERAPY INITIAL EVALUATION ADULT - PERTINENT HX OF CURRENT PROBLEM, REHAB EVAL
S/P fall at home, unable to ambulate after falling
Has The Growth Been Previously Biopsied?: has been previously biopsied
Body Location Override (Optional): Left inferior preauricular

## 2020-01-22 NOTE — PROGRESS NOTE ADULT - SUBJECTIVE AND OBJECTIVE BOX
BEAU COBOS 69y Female  MRN#: [id number]     SUBJECTIVE  Patient is a 69y old Female who presents with a chief complaint of fall (2020 11:44)  Currently admitted to medicine with the primary diagnosis of Unable to ambulate  Today is hospital day 1d, and this morning she reports no overnight events.     OBJECTIVE  PAST MEDICAL & SURGICAL HISTORY  HTN (hypertension)  CVA (cerebral vascular accident)  No significant past surgical history    ALLERGIES:  No Known Allergies    MEDICATIONS:  STANDING MEDICATIONS  atorvastatin 40 milliGRAM(s) Oral at bedtime  chlorhexidine 4% Liquid 1 Application(s) Topical <User Schedule>  donepezil 5 milliGRAM(s) Oral at bedtime  enoxaparin Injectable 40 milliGRAM(s) SubCutaneous daily  hydrochlorothiazide 25 milliGRAM(s) Oral daily  lisinopril 10 milliGRAM(s) Oral daily  PARoxetine 40 milliGRAM(s) Oral daily    PRN MEDICATIONS      VITAL SIGNS: Last 24 Hours  T(C): 36.2 (2020 07:58), Max: 36.7 (2020 18:21)  T(F): 97.2 (2020 07:58), Max: 98 (2020 18:21)  HR: 70 (2020 07:58) (62 - 90)  BP: 114/59 (2020 07:58) (110/55 - 137/71)  BP(mean): --  RR: 18 (2020 07:58) (16 - 18)  SpO2: 97% (2020 07:58) (97% - 99%)    LABS:                        13.0   7.61  )-----------( 295      ( 2020 20:05 )             38.7         139  |  98  |  24<H>  ----------------------------<  103<H>  4.4   |  26  |  1.0    Ca    10.1      2020 20:05    TPro  7.6  /  Alb  4.7  /  TBili  0.6  /  DBili  x   /  AST  21  /  ALT  16  /  AlkPhos  107  -    PT/INR - ( 2020 20:05 )   PT: 11.80 sec;   INR: 1.03 ratio         PTT - ( 2020 20:05 )  PTT:30.0 sec  Urinalysis Basic - ( 2020 22:20 )    Color: Yellow / Appearance: Clear / S.022 / pH: x  Gluc: x / Ketone: Negative  / Bili: Negative / Urobili: <2 mg/dL   Blood: x / Protein: Trace / Nitrite: Negative   Leuk Esterase: Large / RBC: 0 /HPF / WBC 1 /HPF   Sq Epi: x / Non Sq Epi: 0 /HPF / Bacteria: Many      RADIOLOGY:    < from: CT Abdomen and Pelvis w/ IV Cont (20 @ 21:35) >  IMPRESSION:   No evidence of acute traumatic intrathoracic or intra-abdominal pathology.      < end of copied text >    < from: CT Cervical Spine No Cont (20 @ 21:32) >  Impression:    No evidence of acute fracture or subluxation of the cervical spine.    < end of copied text >    < from: CT Head No Cont (20 @ 21:32) >  IMPRESSION:     Parenchymal atrophy compatible with the patient's age  No CT evidence of acute fracture, intracranial hemorrhage, midline shift, or mass effect.    < end of copied text >    PHYSICAL EXAM:    GENERAL: NAD, well-developed, AAOx3  HEENT:  Atraumatic, Normocephalic. EOMI, PERRLA, conjunctiva and sclera clear, No JVD  PULMONARY: Clear to auscultation bilaterally; No wheeze  CARDIOVASCULAR: Regular rate and rhythm; No murmurs, rubs, or gallops  GASTROINTESTINAL: Soft, Nontender, Nondistended; Bowel sounds present  MUSCULOSKELETAL:  2+ Peripheral Pulses, No clubbing, cyanosis, or edema  NEUROLOGY: non-focal  SKIN: No rashes or lesions      ADMISSION SUMMARY  Patient is a 69y old Female who presents with a chief complaint of fall   Currently admitted to medicine with the primary diagnosis of Unable to ambulate    ASSESSMENT & PLAN  69 female hx of HTN, DM, CVA with right sided weakness presented with chief c/o right sided body pain and unable to ambulate after fall one hour prior to presentation.     # S/P Mechanical fall. H/O recurrent fall and functional decline  - trauma work up is negative.   - difficulty ambulating and recurrent falls   - c/o back pain from fall. Tramadol PRN for pain  - PT evaluation done  - Physiatry recommended SNF    # HTN  - c/w lisinopril and HCTZ    # H/O CVA   -c/w statin     # DASH Diet   # DVT PPX: lovenox  # Full code     Pending placement

## 2020-01-23 LAB
-  AMIKACIN: SIGNIFICANT CHANGE UP
-  AMPICILLIN/SULBACTAM: SIGNIFICANT CHANGE UP
-  AMPICILLIN: SIGNIFICANT CHANGE UP
-  AZTREONAM: SIGNIFICANT CHANGE UP
-  CEFAZOLIN: SIGNIFICANT CHANGE UP
-  CEFEPIME: SIGNIFICANT CHANGE UP
-  CEFOXITIN: SIGNIFICANT CHANGE UP
-  CEFTRIAXONE: SIGNIFICANT CHANGE UP
-  CIPROFLOXACIN: SIGNIFICANT CHANGE UP
-  GENTAMICIN: SIGNIFICANT CHANGE UP
-  IMIPENEM: SIGNIFICANT CHANGE UP
-  LEVOFLOXACIN: SIGNIFICANT CHANGE UP
-  MEROPENEM: SIGNIFICANT CHANGE UP
-  NITROFURANTOIN: SIGNIFICANT CHANGE UP
-  PIPERACILLIN/TAZOBACTAM: SIGNIFICANT CHANGE UP
-  TIGECYCLINE: SIGNIFICANT CHANGE UP
-  TOBRAMYCIN: SIGNIFICANT CHANGE UP
-  TRIMETHOPRIM/SULFAMETHOXAZOLE: SIGNIFICANT CHANGE UP
ALBUMIN SERPL ELPH-MCNC: 4.2 G/DL — SIGNIFICANT CHANGE UP (ref 3.5–5.2)
ALP SERPL-CCNC: 89 U/L — SIGNIFICANT CHANGE UP (ref 30–115)
ALT FLD-CCNC: 16 U/L — SIGNIFICANT CHANGE UP (ref 0–41)
ANION GAP SERPL CALC-SCNC: 15 MMOL/L — HIGH (ref 7–14)
AST SERPL-CCNC: 19 U/L — SIGNIFICANT CHANGE UP (ref 0–41)
BILIRUB SERPL-MCNC: 0.6 MG/DL — SIGNIFICANT CHANGE UP (ref 0.2–1.2)
BUN SERPL-MCNC: 29 MG/DL — HIGH (ref 10–20)
CALCIUM SERPL-MCNC: 9.4 MG/DL — SIGNIFICANT CHANGE UP (ref 8.5–10.1)
CHLORIDE SERPL-SCNC: 98 MMOL/L — SIGNIFICANT CHANGE UP (ref 98–110)
CO2 SERPL-SCNC: 25 MMOL/L — SIGNIFICANT CHANGE UP (ref 17–32)
CREAT SERPL-MCNC: 1 MG/DL — SIGNIFICANT CHANGE UP (ref 0.7–1.5)
CULTURE RESULTS: SIGNIFICANT CHANGE UP
GLUCOSE SERPL-MCNC: 123 MG/DL — HIGH (ref 70–99)
HCT VFR BLD CALC: 34.6 % — LOW (ref 37–47)
HGB BLD-MCNC: 11.4 G/DL — LOW (ref 12–16)
MAGNESIUM SERPL-MCNC: 1.8 MG/DL — SIGNIFICANT CHANGE UP (ref 1.8–2.4)
MCHC RBC-ENTMCNC: 30.6 PG — SIGNIFICANT CHANGE UP (ref 27–31)
MCHC RBC-ENTMCNC: 32.9 G/DL — SIGNIFICANT CHANGE UP (ref 32–37)
MCV RBC AUTO: 93 FL — SIGNIFICANT CHANGE UP (ref 81–99)
METHOD TYPE: SIGNIFICANT CHANGE UP
NRBC # BLD: 0 /100 WBCS — SIGNIFICANT CHANGE UP (ref 0–0)
ORGANISM # SPEC MICROSCOPIC CNT: SIGNIFICANT CHANGE UP
ORGANISM # SPEC MICROSCOPIC CNT: SIGNIFICANT CHANGE UP
PLATELET # BLD AUTO: 280 K/UL — SIGNIFICANT CHANGE UP (ref 130–400)
POTASSIUM SERPL-MCNC: 4.1 MMOL/L — SIGNIFICANT CHANGE UP (ref 3.5–5)
POTASSIUM SERPL-SCNC: 4.1 MMOL/L — SIGNIFICANT CHANGE UP (ref 3.5–5)
PROT SERPL-MCNC: 6.5 G/DL — SIGNIFICANT CHANGE UP (ref 6–8)
RBC # BLD: 3.72 M/UL — LOW (ref 4.2–5.4)
RBC # FLD: 13.1 % — SIGNIFICANT CHANGE UP (ref 11.5–14.5)
SODIUM SERPL-SCNC: 138 MMOL/L — SIGNIFICANT CHANGE UP (ref 135–146)
SPECIMEN SOURCE: SIGNIFICANT CHANGE UP
WBC # BLD: 7.44 K/UL — SIGNIFICANT CHANGE UP (ref 4.8–10.8)
WBC # FLD AUTO: 7.44 K/UL — SIGNIFICANT CHANGE UP (ref 4.8–10.8)

## 2020-01-23 PROCEDURE — 99232 SBSQ HOSP IP/OBS MODERATE 35: CPT

## 2020-01-23 RX ORDER — INFLUENZA VIRUS VACCINE 15; 15; 15; 15 UG/.5ML; UG/.5ML; UG/.5ML; UG/.5ML
0.5 SUSPENSION INTRAMUSCULAR ONCE
Refills: 0 | Status: DISCONTINUED | OUTPATIENT
Start: 2020-01-23 | End: 2020-01-31

## 2020-01-23 RX ADMIN — DONEPEZIL HYDROCHLORIDE 5 MILLIGRAM(S): 10 TABLET, FILM COATED ORAL at 21:12

## 2020-01-23 RX ADMIN — ATORVASTATIN CALCIUM 40 MILLIGRAM(S): 80 TABLET, FILM COATED ORAL at 21:12

## 2020-01-23 RX ADMIN — TRAMADOL HYDROCHLORIDE 25 MILLIGRAM(S): 50 TABLET ORAL at 21:11

## 2020-01-23 RX ADMIN — Medication 40 MILLIGRAM(S): at 11:27

## 2020-01-23 RX ADMIN — LISINOPRIL 10 MILLIGRAM(S): 2.5 TABLET ORAL at 05:09

## 2020-01-23 RX ADMIN — ENOXAPARIN SODIUM 40 MILLIGRAM(S): 100 INJECTION SUBCUTANEOUS at 11:27

## 2020-01-23 RX ADMIN — Medication 25 MILLIGRAM(S): at 05:09

## 2020-01-23 NOTE — PROGRESS NOTE ADULT - ASSESSMENT
69 female hx of HTN, DM, CVA with right sided weakness presented with chief c/o right sided body pain and unable to ambulate after fall one hour prior to presentation.     # S/P Mechanical fall.  - trauma work up is negative.   - PT evaluation done  - Physiatry recommended SNF    # HTN  - c/w lisinopril and HCTZ    # H/O CVA   -c/w statin       Pending placement

## 2020-01-23 NOTE — PROGRESS NOTE ADULT - ASSESSMENT
69 female hx of HTN, DM, CVA with right sided weakness presented with chief c/o right sided body pain and unable to ambulate after fall one hour prior to presentation.     # S/P Mechanical fall. History of recurrent fall and functional decline  - trauma work up is negative.   - difficulty ambulating and recurrent falls   - Tramadol PRN for pain  - Physiatry recommended SNF; pending placement    # HTN  - continue lisinopril and HCTZ    # History of CVA   - continue statin     #DASH Diet   #DVT PPX: lovenox  #Activity: as tolerated  #Dispo: pending placement

## 2020-01-23 NOTE — PROGRESS NOTE ADULT - SUBJECTIVE AND OBJECTIVE BOX
Hospital Day:  2d    Subjective:    Patient is a 69y old  Female who presents with a chief complaint of fall (2020 13:35)    There were no acute overnight events. The patient was seen and examined at the bedside. No new complaints.     Past Medical Hx:   HTN (hypertension)  CVA (cerebral vascular accident)    Past Sx:  No significant past surgical history    Allergies:  No Known Allergies    Current Meds:   Standng Meds:  atorvastatin 40 milliGRAM(s) Oral at bedtime  chlorhexidine 4% Liquid 1 Application(s) Topical <User Schedule>  donepezil 5 milliGRAM(s) Oral at bedtime  enoxaparin Injectable 40 milliGRAM(s) SubCutaneous daily  hydrochlorothiazide 25 milliGRAM(s) Oral daily  influenza   Vaccine 0.5 milliLiter(s) IntraMuscular once  lisinopril 10 milliGRAM(s) Oral daily  PARoxetine 40 milliGRAM(s) Oral daily    PRN Meds:  traMADol 25 milliGRAM(s) Oral every 8 hours PRN Severe Pain (7 - 10)    HOME MEDICATIONS:      Vital Signs:   T(F): 97.4 (20 @ 12:43), Max: 97.8 (20 @ 16:40)  HR: 72 (20 @ 12:43) (67 - 101)  BP: 105/55 (20 @ 12:43) (105/55 - 139/64)  RR: 18 (20 @ 12:43) (18 - 18)  SpO2: 99% (20 @ 01:40) (98% - 99%)        Physical Exam:   General: Patient resting comfortably in bed, NAD  HEENT: NCAT, conjunctiva clear, sclera white, PEERLA, EOMI, moist mucous membranes, normal orophaynx  Neck: No masses, no JVD, no cervical lymphadenopathy  Respiratory: good inspiratory effort; lungs clear to auscultation bilaterally  CV: Normal rate, regular rhythm, normal S1/S2, no rubs, gallops, murmurs  GI: abdomen soft, non-tender, non-distended, no masses, normal bowel sounds  Extremities: Well-perfused, no clubbing, no cyanosis, no lower extremity edema bilaterally  Skin: warm and dry  Neurology: AAOx3, mentating appropriately, follows commands, nonfocal    Labs:                         11.4   7.44  )-----------( 280      ( 2020 06:22 )             34.6       2020 06:22    138    |  98     |  29     ----------------------------<  123    4.1     |  25     |  1.0      Ca    9.4        2020 06:22  Mg     1.8       2020 06:22    TPro  6.5    /  Alb  4.2    /  TBili  0.6    /  DBili  x      /  AST  19     /  ALT  16     /  AlkPhos  89     2020 06:22                    Urinalysis Basic - ( 2020 22:20 )    Color: Yellow / Appearance: Clear / S.022 / pH: x  Gluc: x / Ketone: Negative  / Bili: Negative / Urobili: <2 mg/dL   Blood: x / Protein: Trace / Nitrite: Negative   Leuk Esterase: Large / RBC: 0 /HPF / WBC 1 /HPF   Sq Epi: x / Non Sq Epi: 0 /HPF / Bacteria: Many

## 2020-01-24 LAB
ALBUMIN SERPL ELPH-MCNC: 4.1 G/DL — SIGNIFICANT CHANGE UP (ref 3.5–5.2)
ALP SERPL-CCNC: 85 U/L — SIGNIFICANT CHANGE UP (ref 30–115)
ALT FLD-CCNC: 13 U/L — SIGNIFICANT CHANGE UP (ref 0–41)
ANION GAP SERPL CALC-SCNC: 15 MMOL/L — HIGH (ref 7–14)
AST SERPL-CCNC: 16 U/L — SIGNIFICANT CHANGE UP (ref 0–41)
BASOPHILS # BLD AUTO: 0.03 K/UL — SIGNIFICANT CHANGE UP (ref 0–0.2)
BASOPHILS NFR BLD AUTO: 0.4 % — SIGNIFICANT CHANGE UP (ref 0–1)
BILIRUB SERPL-MCNC: 0.8 MG/DL — SIGNIFICANT CHANGE UP (ref 0.2–1.2)
BUN SERPL-MCNC: 30 MG/DL — HIGH (ref 10–20)
CALCIUM SERPL-MCNC: 9.4 MG/DL — SIGNIFICANT CHANGE UP (ref 8.5–10.1)
CHLORIDE SERPL-SCNC: 96 MMOL/L — LOW (ref 98–110)
CO2 SERPL-SCNC: 25 MMOL/L — SIGNIFICANT CHANGE UP (ref 17–32)
CREAT SERPL-MCNC: 1.1 MG/DL — SIGNIFICANT CHANGE UP (ref 0.7–1.5)
EOSINOPHIL # BLD AUTO: 0.16 K/UL — SIGNIFICANT CHANGE UP (ref 0–0.7)
EOSINOPHIL NFR BLD AUTO: 2.3 % — SIGNIFICANT CHANGE UP (ref 0–8)
GLUCOSE SERPL-MCNC: 127 MG/DL — HIGH (ref 70–99)
HCT VFR BLD CALC: 33.8 % — LOW (ref 37–47)
HGB BLD-MCNC: 11.6 G/DL — LOW (ref 12–16)
IMM GRANULOCYTES NFR BLD AUTO: 0.3 % — SIGNIFICANT CHANGE UP (ref 0.1–0.3)
LYMPHOCYTES # BLD AUTO: 2.61 K/UL — SIGNIFICANT CHANGE UP (ref 1.2–3.4)
LYMPHOCYTES # BLD AUTO: 37.4 % — SIGNIFICANT CHANGE UP (ref 20.5–51.1)
MCHC RBC-ENTMCNC: 32.1 PG — HIGH (ref 27–31)
MCHC RBC-ENTMCNC: 34.3 G/DL — SIGNIFICANT CHANGE UP (ref 32–37)
MCV RBC AUTO: 93.6 FL — SIGNIFICANT CHANGE UP (ref 81–99)
MONOCYTES # BLD AUTO: 0.52 K/UL — SIGNIFICANT CHANGE UP (ref 0.1–0.6)
MONOCYTES NFR BLD AUTO: 7.4 % — SIGNIFICANT CHANGE UP (ref 1.7–9.3)
NEUTROPHILS # BLD AUTO: 3.64 K/UL — SIGNIFICANT CHANGE UP (ref 1.4–6.5)
NEUTROPHILS NFR BLD AUTO: 52.2 % — SIGNIFICANT CHANGE UP (ref 42.2–75.2)
NRBC # BLD: 0 /100 WBCS — SIGNIFICANT CHANGE UP (ref 0–0)
PLATELET # BLD AUTO: 267 K/UL — SIGNIFICANT CHANGE UP (ref 130–400)
POTASSIUM SERPL-MCNC: 4.1 MMOL/L — SIGNIFICANT CHANGE UP (ref 3.5–5)
POTASSIUM SERPL-SCNC: 4.1 MMOL/L — SIGNIFICANT CHANGE UP (ref 3.5–5)
PROT SERPL-MCNC: 6.6 G/DL — SIGNIFICANT CHANGE UP (ref 6–8)
RBC # BLD: 3.61 M/UL — LOW (ref 4.2–5.4)
RBC # FLD: 12.9 % — SIGNIFICANT CHANGE UP (ref 11.5–14.5)
SODIUM SERPL-SCNC: 136 MMOL/L — SIGNIFICANT CHANGE UP (ref 135–146)
WBC # BLD: 6.98 K/UL — SIGNIFICANT CHANGE UP (ref 4.8–10.8)
WBC # FLD AUTO: 6.98 K/UL — SIGNIFICANT CHANGE UP (ref 4.8–10.8)

## 2020-01-24 PROCEDURE — 99232 SBSQ HOSP IP/OBS MODERATE 35: CPT

## 2020-01-24 RX ADMIN — LISINOPRIL 10 MILLIGRAM(S): 2.5 TABLET ORAL at 05:50

## 2020-01-24 RX ADMIN — DONEPEZIL HYDROCHLORIDE 5 MILLIGRAM(S): 10 TABLET, FILM COATED ORAL at 21:09

## 2020-01-24 RX ADMIN — CHLORHEXIDINE GLUCONATE 1 APPLICATION(S): 213 SOLUTION TOPICAL at 05:51

## 2020-01-24 RX ADMIN — Medication 25 MILLIGRAM(S): at 05:50

## 2020-01-24 RX ADMIN — TRAMADOL HYDROCHLORIDE 25 MILLIGRAM(S): 50 TABLET ORAL at 21:09

## 2020-01-24 RX ADMIN — ATORVASTATIN CALCIUM 40 MILLIGRAM(S): 80 TABLET, FILM COATED ORAL at 21:09

## 2020-01-24 RX ADMIN — Medication 1 DROP(S): at 11:23

## 2020-01-24 RX ADMIN — Medication 40 MILLIGRAM(S): at 11:23

## 2020-01-24 RX ADMIN — ENOXAPARIN SODIUM 40 MILLIGRAM(S): 100 INJECTION SUBCUTANEOUS at 11:24

## 2020-01-24 NOTE — PROGRESS NOTE ADULT - SUBJECTIVE AND OBJECTIVE BOX
SUBJECTIVE  Patient is a 69y old Female who presents with a chief complaint of fall (22 Jan 2020 11:44)  Currently admitted to medicine with the primary diagnosis of Unable to ambulate  No overnight events.    OBJECTIVE   Vital Signs Last 24 Hrs  T(C): 35.8 (24 Jan 2020 14:01), Max: 36.4 (23 Jan 2020 21:47)  T(F): 96.4 (24 Jan 2020 14:01), Max: 97.5 (23 Jan 2020 21:47)  HR: 74 (24 Jan 2020 14:01) (69 - 75)  BP: 120/67 (24 Jan 2020 14:01) (107/55 - 126/60)  BP(mean): --  RR: 18 (24 Jan 2020 14:01) (18 - 18)  SpO2: 94% (24 Jan 2020 05:00) (94% - 94%)    PHYSICAL EXAM:    GENERAL: NAD, well-developed, AAOx3  HEENT:  Atraumatic, Normocephalic. EOMI, PERRLA, conjunctiva and sclera clear, No JVD  PULMONARY: Clear to auscultation bilaterally; No wheeze  CARDIOVASCULAR: Regular rate and rhythm; No murmurs, rubs, or gallops  GASTROINTESTINAL: Soft, Nontender, Nondistended; Bowel sounds present  MUSCULOSKELETAL:  2+ Peripheral Pulses, No clubbing, cyanosis, or edema  NEUROLOGY: non-focal  SKIN: No rashes or lesions

## 2020-01-24 NOTE — PROGRESS NOTE ADULT - ASSESSMENT
69 female hx of HTN, DM, CVA with right sided weakness presented with chief c/o right sided body pain and unable to ambulate after fall one hour prior to presentation.     # S/P Mechanical fall.  # Recurrent falls,  secondary to both age related debility and preexisting CVA, POA  - trauma work up is negative.   - PT evaluation done  - Physiatry recommended SNF    # HTN  - c/w lisinopril and HCTZ    # H/O CVA   -c/w statin     # Asymptomatic bacteriuria  UA on admission was + for LE and bacteria, but no leukocytes were present.  UCX showed Klebsiella.  Pt without symptoms  Pt was given 1x dose of ABX reportedly by ED, but no further ABX were necessary as it was felt that no true UTI was present.         Pending placement

## 2020-01-24 NOTE — CDI QUERY NOTE - NSCDIOTHERTXTBX_GEN_ALL_CORE_HH
DOCUMENTATION CLARIFICATION FORM     Encounter #: [id number]                                         Patient’s Name: Allyn Laird          Medical Record #: [id number]                               Admit Date: 1-  CDI Specialist/: Trevor                                  Contact #: [phone]    Clarification of Diagnosis  Dear Dr. Nielsen                                                   Date: 1-                  The Physician’s or Provider’s documentation of the patient’s presentation, evaluation and  medical management, as identified below, may support a diagnosis that is not documented in the medical record.  In order to accurately capture all diagnoses to the greatest degree of specificity reflecting the patient’s actual severity of illness, the documentation in this patient’s medical record requires additional clarification.  Please include more specific documentation, either known or suspected, of a corresponding diagnosis associated with the clinical information described below in your Progress Note and/or Discharge Summary.    69yoF admitted after fall trying to get out of bed without her walker    1- ED Documentation by Dr. Sanchez-  “Secondary Discharge Diagnosis: UTI”  1- treated with Rocephin 1G IV for UTI  1- UA positive for leukocytes, negative nitrates, positive for many bacteria  1- Urine culture positive for Klebsiella Pneumoniae    Based on the clinical indicators your professional judgment, please complete by selecting one of the options below if the diagnosis can be further clarified?    (  ) UTI was evaluated and, after study, was Ruled Out.  ( )  UTI was evaluated and after study has resolved.  (  ) Other (please specify):  __________________     (  ) Unable to clinically determine                                                                                Present on Admission:  Was the condition present on admission, if so, please document in the chart that “(the condition) was present on admission.”      Documentation clarification is required for compliance, accuracy in coding and billing, and reporting severity of illness, quality data   and risk of mortality.  --------------------------------------------------------------------------------------------------------------------------------------------  DO NOT REMOVE THIS RECORD WITHOUT FIRST NOTIFYING THE CDI SPECIALIST  This form is NOT a part of the permanent Medical Record.

## 2020-01-24 NOTE — PROGRESS NOTE ADULT - SUBJECTIVE AND OBJECTIVE BOX
BEAU COBOS 69y Female  MRN#: [id number]   CODE STATUS: FULL      SUBJECTIVE    Overnight events - No acute events    Subjective complaints - Pt complains of watering of eye. Otherwise feels well.     OBJECTIVE  PAST MEDICAL & SURGICAL HISTORY  HTN (hypertension)  CVA (cerebral vascular accident)  No significant past surgical history                                            -----------------------------------------------------------  ALLERGIES:  No Known Allergies                                            ------------------------------------------------------------  MEDICATIONS:  STANDING MEDICATIONS  atorvastatin 40 milliGRAM(s) Oral at bedtime  chlorhexidine 4% Liquid 1 Application(s) Topical <User Schedule>  donepezil 5 milliGRAM(s) Oral at bedtime  enoxaparin Injectable 40 milliGRAM(s) SubCutaneous daily  hydrochlorothiazide 25 milliGRAM(s) Oral daily  influenza   Vaccine 0.5 milliLiter(s) IntraMuscular once  lisinopril 10 milliGRAM(s) Oral daily  PARoxetine 40 milliGRAM(s) Oral daily    PRN MEDICATIONS  artificial  tears Solution 1 Drop(s) Both EYES five times a day PRN  traMADol 25 milliGRAM(s) Oral every 8 hours PRN                                            ------------------------------------------------------------  VITAL SIGNS: Last 24 Hours  T(C): 35.8 (24 Jan 2020 05:00), Max: 36.4 (23 Jan 2020 21:47)  T(F): 96.5 (24 Jan 2020 05:00), Max: 97.5 (23 Jan 2020 21:47)  HR: 75 (24 Jan 2020 05:00) (69 - 75)  BP: 126/60 (24 Jan 2020 05:00) (107/55 - 126/60  RR: 18 (24 Jan 2020 05:00) (18 - 18)  SpO2: 94% (24 Jan 2020 05:00) (94% - 94%)                                             --------------------------------------------------------------  LABS:                        11.6   6.98  )-----------( 267      ( 24 Jan 2020 07:57 )             33.8     01-24    136  |  96<L>  |  30<H>  ----------------------------<  127<H>  4.1   |  25  |  1.1    Ca    9.4      24 Jan 2020 07:57  Mg     1.8     01-23    TPro  6.6  /  Alb  4.1  /  TBili  0.8  /  DBili  x   /  AST  16  /  ALT  13  /  AlkPhos  85  01-24      Culture - Urine (collected 21 Jan 2020 22:20)  Source: .Urine Clean Catch (Midstream)  Final Report (23 Jan 2020 19:40):    >100,000 CFU/ml Klebsiella pneumoniae  Organism: Klebsiella pneumoniae (23 Jan 2020 19:40)  Organism: Klebsiella pneumoniae (23 Jan 2020 19:40)                                              -------------------------------------------------------------  RADIOLOGY:  < from: Xray Foot AP + Lateral + Oblique, Right (01.21.20 @ 21:39) >  IMPRESSION:    There is no evidence of fracture or dislocation.    < from: CT Abdomen and Pelvis w/ IV Cont (01.21.20 @ 21:35) >  IMPRESSION:   No evidence of acute traumatic intrathoracic or intra-abdominal pathology.                                            --------------------------------------------------------------    PHYSICAL EXAM:  GENERAL: NAD, well-developed, AAOx3  HEENT:  Atraumatic, Normocephalic. EOMI, PERRLA, conjunctiva and sclera clear, No JVD  PULMONARY: Clear to auscultation bilaterally; No wheeze  CARDIOVASCULAR: Regular rate and rhythm; No murmurs, rubs, or gallops  GASTROINTESTINAL: Soft, Nontender, Nondistended; Bowel sounds present  MUSCULOSKELETAL:  2+ Peripheral Pulses, No clubbing, cyanosis, or edema  NEUROLOGY: non-focal  SKIN: No rashes or lesions                                           --------------------------------------------------------------    ASSESSMENT & PLAN    69 female hx of HTN, DM, CVA with right sided weakness presented with chief c/o right sided body pain and unable to ambulate after fall one hour prior to presentation.     # S/P Mechanical fall. History of recurrent fall and functional decline  - trauma work up is negative.   - difficulty ambulating and recurrent falls   - Tramadol PRN for pain  - Physiatry recommended SNF; pending placement    # HTN  - continue lisinopril and HCTZ    # History of CVA   - continue statin     #DASH Diet   #DVT PPX: lovenox  #Activity: as tolerated  #Dispo: pending placement -insurance issues for placement. BEAU COBOS 69y Female  MRN#: [id number]   CODE STATUS: FULL      SUBJECTIVE    Overnight events - No acute events    Subjective complaints - Pt complains of watering of eye. Otherwise feels well.     OBJECTIVE  PAST MEDICAL & SURGICAL HISTORY  HTN (hypertension)  CVA (cerebral vascular accident)  No significant past surgical history                                            -----------------------------------------------------------  ALLERGIES:  No Known Allergies                                            ------------------------------------------------------------  MEDICATIONS:  STANDING MEDICATIONS  atorvastatin 40 milliGRAM(s) Oral at bedtime  chlorhexidine 4% Liquid 1 Application(s) Topical <User Schedule>  donepezil 5 milliGRAM(s) Oral at bedtime  enoxaparin Injectable 40 milliGRAM(s) SubCutaneous daily  hydrochlorothiazide 25 milliGRAM(s) Oral daily  influenza   Vaccine 0.5 milliLiter(s) IntraMuscular once  lisinopril 10 milliGRAM(s) Oral daily  PARoxetine 40 milliGRAM(s) Oral daily    PRN MEDICATIONS  artificial  tears Solution 1 Drop(s) Both EYES five times a day PRN  traMADol 25 milliGRAM(s) Oral every 8 hours PRN                                            ------------------------------------------------------------  VITAL SIGNS: Last 24 Hours  T(C): 35.8 (24 Jan 2020 05:00), Max: 36.4 (23 Jan 2020 21:47)  T(F): 96.5 (24 Jan 2020 05:00), Max: 97.5 (23 Jan 2020 21:47)  HR: 75 (24 Jan 2020 05:00) (69 - 75)  BP: 126/60 (24 Jan 2020 05:00) (107/55 - 126/60  RR: 18 (24 Jan 2020 05:00) (18 - 18)  SpO2: 94% (24 Jan 2020 05:00) (94% - 94%)                                             --------------------------------------------------------------  LABS:                        11.6   6.98  )-----------( 267      ( 24 Jan 2020 07:57 )             33.8     01-24    136  |  96<L>  |  30<H>  ----------------------------<  127<H>  4.1   |  25  |  1.1    Ca    9.4      24 Jan 2020 07:57  Mg     1.8     01-23    TPro  6.6  /  Alb  4.1  /  TBili  0.8  /  DBili  x   /  AST  16  /  ALT  13  /  AlkPhos  85  01-24      Culture - Urine (collected 21 Jan 2020 22:20)  Source: .Urine Clean Catch (Midstream)  Final Report (23 Jan 2020 19:40):    >100,000 CFU/ml Klebsiella pneumoniae  Organism: Klebsiella pneumoniae (23 Jan 2020 19:40)  Organism: Klebsiella pneumoniae (23 Jan 2020 19:40)                                              -------------------------------------------------------------  RADIOLOGY:  < from: Xray Foot AP + Lateral + Oblique, Right (01.21.20 @ 21:39) >  IMPRESSION:    There is no evidence of fracture or dislocation.    < from: CT Abdomen and Pelvis w/ IV Cont (01.21.20 @ 21:35) >  IMPRESSION:   No evidence of acute traumatic intrathoracic or intra-abdominal pathology.                                            --------------------------------------------------------------    PHYSICAL EXAM:  GENERAL: NAD, well-developed, AAOx3  HEENT:  Atraumatic, Normocephalic. EOMI, PERRLA, conjunctiva and sclera clear, No JVD  PULMONARY: Clear to auscultation bilaterally; No wheeze  CARDIOVASCULAR: Regular rate and rhythm; No murmurs, rubs, or gallops  GASTROINTESTINAL: Soft, Nontender, Nondistended; Bowel sounds present  MUSCULOSKELETAL:  2+ Peripheral Pulses, No clubbing, cyanosis, or edema  NEUROLOGY: non-focal  SKIN: No rashes or lesions                                           --------------------------------------------------------------    ASSESSMENT & PLAN    69 female hx of HTN, DM, CVA with right sided weakness presented with chief c/o right sided body pain and unable to ambulate after fall one hour prior to presentation.     # S/P Mechanical fall. History of recurrent fall and functional decline  - trauma work up is negative.   - difficulty ambulating and recurrent falls   - Tramadol PRN for pain  - Physiatry recommended SNF; pending placement    # HTN  - continue lisinopril and HCTZ    # History of CVA   - continue statin     #DASH Diet   #DVT PPX: lovenox  #Activity: as tolerated  #Dispo: pending placement -insurance issues for placement. NO need for daily labs. Will check labs [id number].

## 2020-01-24 NOTE — CDI QUERY NOTE - NSCDIOTHERTXTBX2_GEN_ALL_CORE_FT
DOCUMENTATION CLARIFICATION FORM     Encounter #: [id number]                                         Patient’s Name: Allny Laird          Medical Record #: [id number]                               Admit Date: 1-  CDI Specialist/: Trevor                                  Contact #: [phone]    Etiology of Falls  Dear Dr. Nielsen                                                   Date: 1-                  The Physician’s or Provider’s documentation of the patient’s presentation, evaluation and  medical management, as identified below, may support a diagnosis that is not documented in the medical record.  In order to accurately capture all diagnoses to the greatest degree of specificity reflecting the patient’s actual severity of illness, the documentation in this patient’s medical record requires additional clarification.  Please include more specific documentation, either known or suspected, of a corresponding diagnosis associated with the clinical information described below in your Progress Note and/or Discharge Summary.    69yoF admitted after fall trying to get out of bed without her walker , difficulty ambulating    1- ED Note “pt cannot walk and family cannot care for her at home at present, Principal    Discharge DX: Unable to ambulate”  1- H&P Dr. Davis difficulty ambulating and recurrent falls, PT / rehab for placement,   reports chronic back pain and multiple falls. She uses walker. She lives with her sister and has    no help. Multiple falls: Likely due to previous CVA”  1- PT Note- “alert; confused; mildly confused, impaired; very fearful of falling. Gets     hysterical when standing  is attempted"  1-22-2020PN Dr Christianson “H/O recurrent fall and functional decline- Physiatry recommended      SNF”  Based on your medical judgment, can you clarify which of these conditions best reflects the etiology of patient’s symptoms?    ( ) Recurrent falls secondary to Age Related Debility  ( ) Falls secondary to Previous CVA  ( ) Other (please specify)______________________  ( ) Unable to clinically determine    Present on Admission:  Was the condition present on admission, if so, please document in the chart that “(the condition) was present on admission?”      Documentation clarification is required for compliance, accuracy in coding and billing, and reporting severity of illness, quality data   and risk of mortality.  --------------------------------------------------------------------------------------------------------------------------------------------  DO NOT REMOVE THIS RECORD WITHOUT FIRST NOTIFYING THE CDI SPECIALIST  This form is NOT a part of the permanent Medical Record.

## 2020-01-25 PROCEDURE — 99232 SBSQ HOSP IP/OBS MODERATE 35: CPT

## 2020-01-25 RX ORDER — CEFTRIAXONE 500 MG/1
1000 INJECTION, POWDER, FOR SOLUTION INTRAMUSCULAR; INTRAVENOUS EVERY 24 HOURS
Refills: 0 | Status: DISCONTINUED | OUTPATIENT
Start: 2020-01-25 | End: 2020-01-27

## 2020-01-25 RX ADMIN — Medication 40 MILLIGRAM(S): at 12:06

## 2020-01-25 RX ADMIN — CHLORHEXIDINE GLUCONATE 1 APPLICATION(S): 213 SOLUTION TOPICAL at 05:47

## 2020-01-25 RX ADMIN — ENOXAPARIN SODIUM 40 MILLIGRAM(S): 100 INJECTION SUBCUTANEOUS at 12:06

## 2020-01-25 RX ADMIN — Medication 1 APPLICATION(S): at 21:05

## 2020-01-25 RX ADMIN — LISINOPRIL 10 MILLIGRAM(S): 2.5 TABLET ORAL at 05:47

## 2020-01-25 RX ADMIN — DONEPEZIL HYDROCHLORIDE 5 MILLIGRAM(S): 10 TABLET, FILM COATED ORAL at 21:04

## 2020-01-25 RX ADMIN — Medication 25 MILLIGRAM(S): at 05:47

## 2020-01-25 RX ADMIN — ATORVASTATIN CALCIUM 40 MILLIGRAM(S): 80 TABLET, FILM COATED ORAL at 21:04

## 2020-01-25 RX ADMIN — Medication 1 DROP(S): at 21:04

## 2020-01-25 NOTE — PROGRESS NOTE ADULT - ASSESSMENT
69 female hx of HTN, DM, CVA with right sided weakness presented with chief c/o right sided body pain and unable to ambulate after fall one hour prior to presentation.     # S/P Mechanical fall.  # Recurrent falls,  secondary to both age related debility and preexisting CVA, POA  - trauma work up is negative.   - PT evaluation done  - Physiatry recommended SNF    # HTN  - c/w lisinopril and HCTZ    # H/O CVA   -c/w statin     # Asymptomatic bacteriuria  UA on admission was + for LE and bacteria, but no leukocytes were present.  UCX showed Klebsiella.  Pt without symptoms  Pt was given 1x dose of ABX reportedly by ED, but no further ABX were necessary as it was felt that no true UTI was present.         Pending placement 69 female hx of HTN, DM, CVA with right sided weakness presented with chief c/o right sided body pain and unable to ambulate after fall one hour prior to presentation.     # S/P Mechanical fall.  # Recurrent falls,  secondary to both age related debility and preexisting CVA, POA  - trauma work up is negative.   - PT evaluation done  - Physiatry recommended SNF    # HTN  - c/w lisinopril and HCTZ    # H/O CVA   -c/w statin     # Metabolic encephalopathy d/t UTI  # Suspect UTI , acute cystitis   UA on admission was + for LE and bacteria, but no leukocytes were present.  UCX showed Klebsiella.   unable to assess pt for symptoms d/t confusion  Pt was given 1x dose of ABX reportedly by ED, then ABX were held as it was felt that no true UTI was present. However, I agree that ABX should be resumed as benefits of treating UTI outweigh risk of medication usage.   monitor pt clinically for s/s improvement        Pending placement

## 2020-01-25 NOTE — PROGRESS NOTE ADULT - SUBJECTIVE AND OBJECTIVE BOX
Hospital Day:  4d    Subjective:    Patient is a 69y old  Female who presents with a chief complaint of fall (2020 16:49)    There were no acute overnight events. The patient was seen and examined at the bedside. Patient continues to complain of left eye tearing. Otherwise no new coplaints. Denies fever, chills, headache, dizziness, chest pain, palpitations, shortness of breath, abdominal pain, nausea, vomiting, diarrhea.    Past Medical Hx:   HTN (hypertension)  CVA (cerebral vascular accident)    Past Sx:  No significant past surgical history    Allergies:  No Known Allergies    Current Meds:   Standng Meds:  atorvastatin 40 milliGRAM(s) Oral at bedtime  chlorhexidine 4% Liquid 1 Application(s) Topical <User Schedule>  donepezil 5 milliGRAM(s) Oral at bedtime  enoxaparin Injectable 40 milliGRAM(s) SubCutaneous daily  hydrochlorothiazide 25 milliGRAM(s) Oral daily  influenza   Vaccine 0.5 milliLiter(s) IntraMuscular once  lisinopril 10 milliGRAM(s) Oral daily  PARoxetine 40 milliGRAM(s) Oral daily    PRN Meds:  artificial  tears Solution 1 Drop(s) Both EYES five times a day PRN Dry Eyes  traMADol 25 milliGRAM(s) Oral every 8 hours PRN Severe Pain (7 - 10)    HOME MEDICATIONS:      Vital Signs:   T(F): 96.8 (20 @ 05:14), Max: 98 (20 @ 19:55)  HR: 74 (20 @ 05:14) (74 - 94)  BP: 119/53 (20 @ 05:14) (119/53 - 120/84)  RR: 18 (20 @ 05:14) (18 - 18)  SpO2: --        Physical Exam:   General: Patient resting comfortably in bed, NAD  HEENT: NCAT, conjunctiva clear, sclera white, left eye tearing, PEERLA, EOMI, moist mucous membranes, normal orophaynx  Neck: No masses, no JVD, no cervical lymphadenopathy  Respiratory: good inspiratory effort; lungs clear to auscultation bilaterally  CV: Normal rate, regular rhythm, normal S1/S2, no rubs, gallops, murmurs  GI: abdomen soft, non-tender, non-distended, no masses, normal bowel sounds  Extremities: Well-perfused, no clubbing, no cyanosis, no lower extremity edema bilaterally  Skin: warm and dry  Neurology: AAOx3, mentating appropriately, follows commands    Labs:                         11.6   6.98  )-----------( 267      ( 2020 07:57 )             33.8       2020 07:57    136    |  96     |  30     ----------------------------<  127    4.1     |  25     |  1.1      Ca    9.4        2020 07:57    TPro  6.6    /  Alb  4.1    /  TBili  0.8    /  DBili  x      /  AST  16     /  ALT  13     /  AlkPhos  85     2020 07:57                    Urinalysis Basic - ( 2020 22:20 )    Color: Yellow / Appearance: Clear / S.022 / pH: x  Gluc: x / Ketone: Negative  / Bili: Negative / Urobili: <2 mg/dL   Blood: x / Protein: Trace / Nitrite: Negative   Leuk Esterase: Large / RBC: 0 /HPF / WBC 1 /HPF   Sq Epi: x / Non Sq Epi: 0 /HPF / Bacteria: Many

## 2020-01-25 NOTE — PROGRESS NOTE ADULT - SUBJECTIVE AND OBJECTIVE BOX
SUBJECTIVE  Patient is a 69y old Female who presents with a chief complaint of fall (22 Jan 2020 11:44)  Currently admitted to medicine with the primary diagnosis of Unable to ambulate  No overnight events.  NO complaints      OBJECTIVE   Vital Signs Last 24 Hrs  T(C): 36 (25 Jan 2020 05:14), Max: 36.7 (24 Jan 2020 19:55)  T(F): 96.8 (25 Jan 2020 05:14), Max: 98 (24 Jan 2020 19:55)  HR: 74 (25 Jan 2020 05:14) (74 - 94)  BP: 119/53 (25 Jan 2020 05:14) (119/53 - 120/84)  BP(mean): --  RR: 18 (25 Jan 2020 05:14) (18 - 18)  SpO2: --    PHYSICAL EXAM:    GENERAL: NAD, well-developed, AAOx3  HEENT:  Atraumatic, Normocephalic. EOMI, PERRLA, conjunctiva and sclera clear, No JVD  PULMONARY: Clear to auscultation bilaterally; No wheeze  CARDIOVASCULAR: Regular rate and rhythm; No murmurs, rubs, or gallops  GASTROINTESTINAL: Soft, Nontender, Nondistended; Bowel sounds present  MUSCULOSKELETAL:  2+ Peripheral Pulses, No clubbing, cyanosis, or edema  NEUROLOGY: non-focal  SKIN: No rashes or lesions SUBJECTIVE  Patient is a 69y old Female who presents with a chief complaint of fall (22 Jan 2020 11:44)  Currently admitted to medicine with the primary diagnosis of Unable to ambulate    Patient was reportedly to be confused later in the day.      OBJECTIVE   Vital Signs Last 24 Hrs  T(C): 36 (25 Jan 2020 05:14), Max: 36.7 (24 Jan 2020 19:55)  T(F): 96.8 (25 Jan 2020 05:14), Max: 98 (24 Jan 2020 19:55)  HR: 74 (25 Jan 2020 05:14) (74 - 94)  BP: 119/53 (25 Jan 2020 05:14) (119/53 - 120/84)  BP(mean): --  RR: 18 (25 Jan 2020 05:14) (18 - 18)  SpO2: --    PHYSICAL EXAM:    GENERAL: NAD, well-developed,confused  HEENT:  Atraumatic, Normocephalic. EOMI, PERRLA, conjunctiva and sclera clear, No JVD  PULMONARY: Clear to auscultation bilaterally; No wheeze  CARDIOVASCULAR: Regular rate and rhythm; No murmurs, rubs, or gallops  GASTROINTESTINAL: Soft, Nontender, Nondistended; Bowel sounds present  MUSCULOSKELETAL:  2+ Peripheral Pulses, No clubbing, cyanosis, or edema  NEUROLOGY: confused slightly, but answers questions. alert  SKIN: No rashes or lesions

## 2020-01-25 NOTE — CHART NOTE - NSCHARTNOTEFT_GEN_A_CORE
Called to assess the patient for confusion and agitation. She reportedly had been screaming and trying to get out of her bed (patient is non-ambulatory). She was moved out into the hallway in a recliner, which resulted in improvement in agitation. Patient is normally calm and oriented at baseline. She has multiple risk factors for hospital acquired delirium, including advanced age (69 years old), dementia (on donepezil), and history of CVA.  On approach, the patient is pleasant, but disinhibited, wanting to hold hands Called to assess the patient for confusion and agitation. She reportedly had been screaming and trying to get out of her bed (patient is non-ambulatory). She was moved out into the hallway in a recliner, which resulted in improvement in agitation. Patient is normally calm and oriented at baseline. She has multiple risk factors for hospital acquired delirium, including advanced age (69 years old), dementia (on donepezil), and history of CVA.  On approach, the patient is pleasant, but disinhibited. She is oriented x3. Pat Called to assess the patient for confusion and agitation. She reportedly had been screaming and trying to get out of her bed (patient is non-ambulatory). She was moved out into the hallway in a recliner, which resulted in improvement in agitation. Patient is normally calm and oriented at baseline. She has multiple risk factors for hospital acquired delirium, including advanced age (69 years old), dementia (on donepezil), and history of CVA.  On approach, the patient is pleasant, but disinhibited. She is oriented x3. Physical exam is benign. Patient denies pain, dysuria, fever.    On review of the patient's chart, she had a positive urine culture on admission. She received a dose of ceftriaxone in the ED, but abx were discontinued because the patient was asymptomatic. In light of the patient's confusion and likely unreliable history/ROS, will start on ceftriaxone and monitor for improvement in delirium/agitation.

## 2020-01-25 NOTE — PROGRESS NOTE ADULT - ASSESSMENT
69 female hx of HTN, DM, CVA with right sided weakness presented with chief c/o right sided body pain and unable to ambulate after fall one hour prior to presentation.     # S/P Mechanical fall. History of recurrent fall and functional decline  - trauma work up is negative.   - difficulty ambulating and recurrent falls   - Tramadol PRN for pain  - Physiatry recommended SNF; pending placement    # Left eye tearing  - no conjunctival injection  - artificial tears prn  - lacrilube qhs    # HTN  - continue lisinopril and HCTZ    # History of CVA   - continue statin     #DASH Diet   #DVT PPX: lovenox  #Activity: as tolerated  #Dispo: pending placement

## 2020-01-26 LAB
ALBUMIN SERPL ELPH-MCNC: 4.3 G/DL — SIGNIFICANT CHANGE UP (ref 3.5–5.2)
ALP SERPL-CCNC: 89 U/L — SIGNIFICANT CHANGE UP (ref 30–115)
ALT FLD-CCNC: 16 U/L — SIGNIFICANT CHANGE UP (ref 0–41)
ANION GAP SERPL CALC-SCNC: 15 MMOL/L — HIGH (ref 7–14)
AST SERPL-CCNC: 20 U/L — SIGNIFICANT CHANGE UP (ref 0–41)
BASOPHILS # BLD AUTO: 0.03 K/UL — SIGNIFICANT CHANGE UP (ref 0–0.2)
BASOPHILS NFR BLD AUTO: 0.4 % — SIGNIFICANT CHANGE UP (ref 0–1)
BILIRUB SERPL-MCNC: 0.6 MG/DL — SIGNIFICANT CHANGE UP (ref 0.2–1.2)
BUN SERPL-MCNC: 31 MG/DL — HIGH (ref 10–20)
CALCIUM SERPL-MCNC: 9.4 MG/DL — SIGNIFICANT CHANGE UP (ref 8.5–10.1)
CHLORIDE SERPL-SCNC: 100 MMOL/L — SIGNIFICANT CHANGE UP (ref 98–110)
CO2 SERPL-SCNC: 26 MMOL/L — SIGNIFICANT CHANGE UP (ref 17–32)
CREAT SERPL-MCNC: 1 MG/DL — SIGNIFICANT CHANGE UP (ref 0.7–1.5)
EOSINOPHIL # BLD AUTO: 0.19 K/UL — SIGNIFICANT CHANGE UP (ref 0–0.7)
EOSINOPHIL NFR BLD AUTO: 2.4 % — SIGNIFICANT CHANGE UP (ref 0–8)
GLUCOSE SERPL-MCNC: 105 MG/DL — HIGH (ref 70–99)
HCT VFR BLD CALC: 36.3 % — LOW (ref 37–47)
HGB BLD-MCNC: 11.8 G/DL — LOW (ref 12–16)
IMM GRANULOCYTES NFR BLD AUTO: 0.1 % — SIGNIFICANT CHANGE UP (ref 0.1–0.3)
LYMPHOCYTES # BLD AUTO: 2.71 K/UL — SIGNIFICANT CHANGE UP (ref 1.2–3.4)
LYMPHOCYTES # BLD AUTO: 34.9 % — SIGNIFICANT CHANGE UP (ref 20.5–51.1)
MCHC RBC-ENTMCNC: 30.7 PG — SIGNIFICANT CHANGE UP (ref 27–31)
MCHC RBC-ENTMCNC: 32.5 G/DL — SIGNIFICANT CHANGE UP (ref 32–37)
MCV RBC AUTO: 94.5 FL — SIGNIFICANT CHANGE UP (ref 81–99)
MONOCYTES # BLD AUTO: 0.59 K/UL — SIGNIFICANT CHANGE UP (ref 0.1–0.6)
MONOCYTES NFR BLD AUTO: 7.6 % — SIGNIFICANT CHANGE UP (ref 1.7–9.3)
NEUTROPHILS # BLD AUTO: 4.24 K/UL — SIGNIFICANT CHANGE UP (ref 1.4–6.5)
NEUTROPHILS NFR BLD AUTO: 54.6 % — SIGNIFICANT CHANGE UP (ref 42.2–75.2)
NRBC # BLD: 0 /100 WBCS — SIGNIFICANT CHANGE UP (ref 0–0)
PLATELET # BLD AUTO: 251 K/UL — SIGNIFICANT CHANGE UP (ref 130–400)
POTASSIUM SERPL-MCNC: 4.3 MMOL/L — SIGNIFICANT CHANGE UP (ref 3.5–5)
POTASSIUM SERPL-SCNC: 4.3 MMOL/L — SIGNIFICANT CHANGE UP (ref 3.5–5)
PROT SERPL-MCNC: 6.8 G/DL — SIGNIFICANT CHANGE UP (ref 6–8)
RBC # BLD: 3.84 M/UL — LOW (ref 4.2–5.4)
RBC # FLD: 12.9 % — SIGNIFICANT CHANGE UP (ref 11.5–14.5)
SODIUM SERPL-SCNC: 141 MMOL/L — SIGNIFICANT CHANGE UP (ref 135–146)
WBC # BLD: 7.77 K/UL — SIGNIFICANT CHANGE UP (ref 4.8–10.8)
WBC # FLD AUTO: 7.77 K/UL — SIGNIFICANT CHANGE UP (ref 4.8–10.8)

## 2020-01-26 PROCEDURE — 99232 SBSQ HOSP IP/OBS MODERATE 35: CPT

## 2020-01-26 RX ORDER — LANOLIN ALCOHOL/MO/W.PET/CERES
5 CREAM (GRAM) TOPICAL ONCE
Refills: 0 | Status: COMPLETED | OUTPATIENT
Start: 2020-01-26 | End: 2020-01-26

## 2020-01-26 RX ADMIN — CEFTRIAXONE 100 MILLIGRAM(S): 500 INJECTION, POWDER, FOR SOLUTION INTRAMUSCULAR; INTRAVENOUS at 01:14

## 2020-01-26 RX ADMIN — Medication 40 MILLIGRAM(S): at 11:41

## 2020-01-26 RX ADMIN — Medication 5 MILLIGRAM(S): at 21:28

## 2020-01-26 RX ADMIN — Medication 25 MILLIGRAM(S): at 06:38

## 2020-01-26 RX ADMIN — CEFTRIAXONE 100 MILLIGRAM(S): 500 INJECTION, POWDER, FOR SOLUTION INTRAMUSCULAR; INTRAVENOUS at 17:36

## 2020-01-26 RX ADMIN — ATORVASTATIN CALCIUM 40 MILLIGRAM(S): 80 TABLET, FILM COATED ORAL at 21:29

## 2020-01-26 RX ADMIN — ENOXAPARIN SODIUM 40 MILLIGRAM(S): 100 INJECTION SUBCUTANEOUS at 11:42

## 2020-01-26 RX ADMIN — LISINOPRIL 10 MILLIGRAM(S): 2.5 TABLET ORAL at 06:38

## 2020-01-26 RX ADMIN — Medication 1 APPLICATION(S): at 21:29

## 2020-01-26 RX ADMIN — DONEPEZIL HYDROCHLORIDE 5 MILLIGRAM(S): 10 TABLET, FILM COATED ORAL at 21:29

## 2020-01-26 NOTE — PROGRESS NOTE ADULT - SUBJECTIVE AND OBJECTIVE BOX
SUBJECTIVE  Patient has been still confused.  not reliable for complaints but claims that she can't see anything, and has teary, watery eyes.    OBJECTIVE   Vital Signs Last 24 Hrs  T(C): 35.8 (26 Jan 2020 05:15), Max: 36.3 (25 Jan 2020 14:05)  T(F): 96.4 (26 Jan 2020 05:15), Max: 97.4 (25 Jan 2020 14:05)  HR: 63 (26 Jan 2020 06:32) (63 - 70)  BP: 110/53 (26 Jan 2020 06:32) (105/55 - 126/60)  BP(mean): --  RR: 18 (26 Jan 2020 05:15) (16 - 18)  SpO2: --      PHYSICAL EXAM:    GENERAL: NAD, well-developed,confused  HEENT:  EOMI.  pt has intact vision and is able to see and identify objects clearly.  No watery eyes, conjunctiva clear and dry  PULMONARY: Clear to auscultation bilaterally; No wheeze  CARDIOVASCULAR: Regular rate and rhythm; No murmurs, rubs, or gallops  GASTROINTESTINAL: Soft, Nontender, Nondistended; Bowel sounds present  MUSCULOSKELETAL:  2+ Peripheral Pulses, No clubbing, cyanosis, or edema  NEUROLOGY: confused slightly, but answers questions. alert  SKIN: No rashes or lesions        Urinalysis (01.21.20 @ 22:20)    pH Urine: 5.5    Glucose Qualitative, Urine: Negative    Blood, Urine: Negative    Color: Yellow    Urine Appearance: Clear    Bilirubin: Negative    Ketone - Urine: Negative    Specific Gravity: 1.022    Protein, Urine: Trace    Urobilinogen: <2 mg/dL    Nitrite: Negative    Leukocyte Esterase Concentration: Large

## 2020-01-26 NOTE — PROGRESS NOTE ADULT - ASSESSMENT
69 female hx of HTN, DM, CVA with right sided weakness presented with chief c/o right sided body pain and unable to ambulate after fall one hour prior to presentation.     # S/P Mechanical fall.  # Recurrent falls,  secondary to both age related debility and preexisting CVA, POA  - trauma work up is negative.   - PT evaluation done  - Physiatry recommended SNF    # HTN  - c/w lisinopril and HCTZ    # H/O CVA   -c/w statin     # Metabolic encephalopathy d/t UTI  # Suspect UTI , acute cystitis   UA on admission was + for LE and bacteria, but no leukocytes were present.  UCX showed Klebsiella.   unable to assess pt for symptoms d/t confusion  Pt was given 1x dose of ABX reportedly by ED, then ABX were held as it was felt that no true UTI was present. However, I agree that ABX should be resumed as benefits of treating UTI outweighs risk of medication usage.   monitor pt clinically for s/s improvement        Pending placement

## 2020-01-27 LAB
ALBUMIN SERPL ELPH-MCNC: 4.4 G/DL — SIGNIFICANT CHANGE UP (ref 3.5–5.2)
ALP SERPL-CCNC: 92 U/L — SIGNIFICANT CHANGE UP (ref 30–115)
ALT FLD-CCNC: 19 U/L — SIGNIFICANT CHANGE UP (ref 0–41)
ANION GAP SERPL CALC-SCNC: 14 MMOL/L — SIGNIFICANT CHANGE UP (ref 7–14)
AST SERPL-CCNC: 23 U/L — SIGNIFICANT CHANGE UP (ref 0–41)
BASOPHILS # BLD AUTO: 0.03 K/UL — SIGNIFICANT CHANGE UP (ref 0–0.2)
BASOPHILS NFR BLD AUTO: 0.4 % — SIGNIFICANT CHANGE UP (ref 0–1)
BILIRUB SERPL-MCNC: 0.8 MG/DL — SIGNIFICANT CHANGE UP (ref 0.2–1.2)
BUN SERPL-MCNC: 25 MG/DL — HIGH (ref 10–20)
CALCIUM SERPL-MCNC: 9.8 MG/DL — SIGNIFICANT CHANGE UP (ref 8.5–10.1)
CHLORIDE SERPL-SCNC: 100 MMOL/L — SIGNIFICANT CHANGE UP (ref 98–110)
CO2 SERPL-SCNC: 27 MMOL/L — SIGNIFICANT CHANGE UP (ref 17–32)
CREAT SERPL-MCNC: 0.9 MG/DL — SIGNIFICANT CHANGE UP (ref 0.7–1.5)
EOSINOPHIL # BLD AUTO: 0.21 K/UL — SIGNIFICANT CHANGE UP (ref 0–0.7)
EOSINOPHIL NFR BLD AUTO: 2.8 % — SIGNIFICANT CHANGE UP (ref 0–8)
GLUCOSE SERPL-MCNC: 125 MG/DL — HIGH (ref 70–99)
HCT VFR BLD CALC: 37.6 % — SIGNIFICANT CHANGE UP (ref 37–47)
HGB BLD-MCNC: 12.6 G/DL — SIGNIFICANT CHANGE UP (ref 12–16)
IMM GRANULOCYTES NFR BLD AUTO: 0.4 % — HIGH (ref 0.1–0.3)
LYMPHOCYTES # BLD AUTO: 2.24 K/UL — SIGNIFICANT CHANGE UP (ref 1.2–3.4)
LYMPHOCYTES # BLD AUTO: 29.5 % — SIGNIFICANT CHANGE UP (ref 20.5–51.1)
MCHC RBC-ENTMCNC: 31.4 PG — HIGH (ref 27–31)
MCHC RBC-ENTMCNC: 33.5 G/DL — SIGNIFICANT CHANGE UP (ref 32–37)
MCV RBC AUTO: 93.8 FL — SIGNIFICANT CHANGE UP (ref 81–99)
MONOCYTES # BLD AUTO: 0.62 K/UL — HIGH (ref 0.1–0.6)
MONOCYTES NFR BLD AUTO: 8.2 % — SIGNIFICANT CHANGE UP (ref 1.7–9.3)
NEUTROPHILS # BLD AUTO: 4.47 K/UL — SIGNIFICANT CHANGE UP (ref 1.4–6.5)
NEUTROPHILS NFR BLD AUTO: 58.7 % — SIGNIFICANT CHANGE UP (ref 42.2–75.2)
NRBC # BLD: 0 /100 WBCS — SIGNIFICANT CHANGE UP (ref 0–0)
PLATELET # BLD AUTO: 256 K/UL — SIGNIFICANT CHANGE UP (ref 130–400)
POTASSIUM SERPL-MCNC: 4.4 MMOL/L — SIGNIFICANT CHANGE UP (ref 3.5–5)
POTASSIUM SERPL-SCNC: 4.4 MMOL/L — SIGNIFICANT CHANGE UP (ref 3.5–5)
PROT SERPL-MCNC: 7.1 G/DL — SIGNIFICANT CHANGE UP (ref 6–8)
RBC # BLD: 4.01 M/UL — LOW (ref 4.2–5.4)
RBC # FLD: 12.7 % — SIGNIFICANT CHANGE UP (ref 11.5–14.5)
SODIUM SERPL-SCNC: 141 MMOL/L — SIGNIFICANT CHANGE UP (ref 135–146)
WBC # BLD: 7.6 K/UL — SIGNIFICANT CHANGE UP (ref 4.8–10.8)
WBC # FLD AUTO: 7.6 K/UL — SIGNIFICANT CHANGE UP (ref 4.8–10.8)

## 2020-01-27 PROCEDURE — 99232 SBSQ HOSP IP/OBS MODERATE 35: CPT

## 2020-01-27 RX ADMIN — LISINOPRIL 10 MILLIGRAM(S): 2.5 TABLET ORAL at 05:46

## 2020-01-27 RX ADMIN — Medication 25 MILLIGRAM(S): at 05:46

## 2020-01-27 RX ADMIN — Medication 40 MILLIGRAM(S): at 11:38

## 2020-01-27 RX ADMIN — ENOXAPARIN SODIUM 40 MILLIGRAM(S): 100 INJECTION SUBCUTANEOUS at 11:39

## 2020-01-27 RX ADMIN — Medication 1 APPLICATION(S): at 21:41

## 2020-01-27 RX ADMIN — ATORVASTATIN CALCIUM 40 MILLIGRAM(S): 80 TABLET, FILM COATED ORAL at 21:32

## 2020-01-27 RX ADMIN — DONEPEZIL HYDROCHLORIDE 5 MILLIGRAM(S): 10 TABLET, FILM COATED ORAL at 21:32

## 2020-01-27 RX ADMIN — Medication 1 DROP(S): at 21:32

## 2020-01-27 NOTE — PROGRESS NOTE ADULT - ASSESSMENT
69 female hx of HTN, DM, CVA with right sided weakness presented with chief c/o right sided body pain and unable to ambulate after fall one hour prior to presentation.     # S/P Mechanical fall.  # Recurrent falls,  secondary to both age related debility and preexisting CVA, POA  - trauma work up is negative.   - PT evaluation done  - Physiatry recommended SNF    # HTN  - c/w lisinopril and HCTZ    # H/O CVA   -c/w statin     # Metabolic encephalopathy d/t UTI  # Suspect UTI , acute cystitis   UA on admission was + for LE and bacteria, but no leukocytes were present.  UCX showed Klebsiella.   unable to assess pt for symptoms d/t confusion  can stop empiric ABX after today      medically ready for dispo  Pending placement

## 2020-01-27 NOTE — PROGRESS NOTE ADULT - SUBJECTIVE AND OBJECTIVE BOX
Hospital Day:  6d    Subjective:    Patient is a 69y old  Female who presents with a chief complaint of fall (26 Jan 2020 11:37)    There were no acute overnight events. The patient was seen and examined at the bedside. No complaints. Patient is minimally interactive this morning and does not participate in interview/exam.    Past Medical Hx:   HTN (hypertension)  CVA (cerebral vascular accident)    Past Sx:  No significant past surgical history    Allergies:  No Known Allergies    Current Meds:   Standng Meds:  atorvastatin 40 milliGRAM(s) Oral at bedtime  chlorhexidine 4% Liquid 1 Application(s) Topical <User Schedule>  donepezil 5 milliGRAM(s) Oral at bedtime  enoxaparin Injectable 40 milliGRAM(s) SubCutaneous daily  hydrochlorothiazide 25 milliGRAM(s) Oral daily  influenza   Vaccine 0.5 milliLiter(s) IntraMuscular once  lisinopril 10 milliGRAM(s) Oral daily  PARoxetine 40 milliGRAM(s) Oral daily  petrolatum Ophthalmic Ointment 1 Application(s) Both EYES at bedtime    PRN Meds:  artificial  tears Solution 1 Drop(s) Both EYES five times a day PRN Dry Eyes  traMADol 25 milliGRAM(s) Oral every 8 hours PRN Severe Pain (7 - 10)    HOME MEDICATIONS:      Vital Signs:   T(F): 97.3 (01-27-20 @ 04:45), Max: 97.3 (01-27-20 @ 04:45)  HR: 65 (01-27-20 @ 04:45) (58 - 67)  BP: 106/55 (01-27-20 @ 04:45) (106/55 - 120/80)  RR: 18 (01-27-20 @ 04:45) (18 - 18)  SpO2: --        Physical Exam:   *Exam limited by lack of cooperation*  General: Patient resting comfortably in bed, NAD  Respiratory: good inspiratory effort; lungs clear to auscultation bilaterally  CV: Normal rate, regular rhythm, normal S1/S2, no rubs, gallops, murmurs    Labs:                         12.6   7.60  )-----------( 256      ( 27 Jan 2020 07:33 )             37.6     Neutophil% 58.7, Lymphocyte% 29.5, Monocyte% 8.2, Bands% 0.4 01-27-20 @ 07:33    27 Jan 2020 07:33    141    |  100    |  25     ----------------------------<  125    4.4     |  27     |  0.9      Ca    9.8        27 Jan 2020 07:33    TPro  7.1    /  Alb  4.4    /  TBili  0.8    /  DBili  x      /  AST  23     /  ALT  19     /  AlkPhos  92     27 Jan 2020 07:33

## 2020-01-27 NOTE — PROGRESS NOTE ADULT - SUBJECTIVE AND OBJECTIVE BOX
SUBJECTIVE  Patient has been still confused, however she had dementia at baseline and is confused at baseline.  not reliable for complaints        OBJECTIVE   Vital Signs Last 24 Hrs  T(C): 36.3 (27 Jan 2020 04:45), Max: 36.3 (27 Jan 2020 04:45)  T(F): 97.3 (27 Jan 2020 04:45), Max: 97.3 (27 Jan 2020 04:45)  HR: 65 (27 Jan 2020 04:45) (65 - 67)  BP: 106/55 (27 Jan 2020 04:45) (106/55 - 120/80)  BP(mean): --  RR: 18 (27 Jan 2020 04:45) (18 - 18)  SpO2: --      PHYSICAL EXAM:  GENERAL: NAD, well-developed, confused at baseline  HEENT:  EOMI.  pt has intact vision and is able to see and identify objects clearly.  No watery eyes, conjunctiva clear and dry  PULMONARY: Clear to auscultation bilaterally; No wheeze  CARDIOVASCULAR: Regular rate and rhythm; No murmurs, rubs, or gallops  GASTROINTESTINAL: Soft, Nontender, Nondistended; Bowel sounds present  MUSCULOSKELETAL:  2+ Peripheral Pulses, No clubbing, cyanosis, or edema  NEUROLOGY: at baseline  SKIN: No rashes or lesions        Urinalysis (01.21.20 @ 22:20)    pH Urine: 5.5    Glucose Qualitative, Urine: Negative    Blood, Urine: Negative    Color: Yellow    Urine Appearance: Clear    Bilirubin: Negative    Ketone - Urine: Negative    Specific Gravity: 1.022    Protein, Urine: Trace    Urobilinogen: <2 mg/dL    Nitrite: Negative    Leukocyte Esterase Concentration: Large

## 2020-01-27 NOTE — PROGRESS NOTE ADULT - ASSESSMENT
69 female hx of HTN, DM, CVA with right sided weakness presented with chief c/o right sided body pain and unable to ambulate after fall one hour prior to presentation.     # Acute delirium, likely secondary to UTI  - multiple risk factors for delirium: dementia, previous CVA, hospital stay  - found to have klebsiella bacteruria on admission; not treated because patient denied symptoms  - started on ceftriaxone when patient became confused/agitated; completed 3 days  - will follow up UA    # S/P Mechanical fall. History of recurrent fall and functional decline  - trauma work up is negative.   - difficulty ambulating and recurrent falls   - Tramadol PRN for pain  - Physiatry recommended SNF; pending placement    # Left eye tearing  - no conjunctival injection  - artificial tears prn  - lacrilube qhs    # HTN  - continue lisinopril and HCTZ    # History of CVA   - continue statin     #DASH Diet   #DVT PPX: lovenox  #Activity: as tolerated  #Dispo: pending placement

## 2020-01-28 ENCOUNTER — TRANSCRIPTION ENCOUNTER (OUTPATIENT)
Age: 70
End: 2020-01-28

## 2020-01-28 LAB
ALBUMIN SERPL ELPH-MCNC: 4.2 G/DL — SIGNIFICANT CHANGE UP (ref 3.5–5.2)
ALP SERPL-CCNC: 92 U/L — SIGNIFICANT CHANGE UP (ref 30–115)
ALT FLD-CCNC: 18 U/L — SIGNIFICANT CHANGE UP (ref 0–41)
ANION GAP SERPL CALC-SCNC: 14 MMOL/L — SIGNIFICANT CHANGE UP (ref 7–14)
AST SERPL-CCNC: 23 U/L — SIGNIFICANT CHANGE UP (ref 0–41)
BASOPHILS # BLD AUTO: 0.02 K/UL — SIGNIFICANT CHANGE UP (ref 0–0.2)
BASOPHILS NFR BLD AUTO: 0.3 % — SIGNIFICANT CHANGE UP (ref 0–1)
BILIRUB SERPL-MCNC: 0.9 MG/DL — SIGNIFICANT CHANGE UP (ref 0.2–1.2)
BUN SERPL-MCNC: 25 MG/DL — HIGH (ref 10–20)
CALCIUM SERPL-MCNC: 9.6 MG/DL — SIGNIFICANT CHANGE UP (ref 8.5–10.1)
CHLORIDE SERPL-SCNC: 99 MMOL/L — SIGNIFICANT CHANGE UP (ref 98–110)
CO2 SERPL-SCNC: 27 MMOL/L — SIGNIFICANT CHANGE UP (ref 17–32)
CREAT SERPL-MCNC: 0.9 MG/DL — SIGNIFICANT CHANGE UP (ref 0.7–1.5)
EOSINOPHIL # BLD AUTO: 0.17 K/UL — SIGNIFICANT CHANGE UP (ref 0–0.7)
EOSINOPHIL NFR BLD AUTO: 2.4 % — SIGNIFICANT CHANGE UP (ref 0–8)
GLUCOSE SERPL-MCNC: 120 MG/DL — HIGH (ref 70–99)
HCT VFR BLD CALC: 36 % — LOW (ref 37–47)
HGB BLD-MCNC: 12.6 G/DL — SIGNIFICANT CHANGE UP (ref 12–16)
IMM GRANULOCYTES NFR BLD AUTO: 0.3 % — SIGNIFICANT CHANGE UP (ref 0.1–0.3)
LYMPHOCYTES # BLD AUTO: 2.3 K/UL — SIGNIFICANT CHANGE UP (ref 1.2–3.4)
LYMPHOCYTES # BLD AUTO: 32.2 % — SIGNIFICANT CHANGE UP (ref 20.5–51.1)
MCHC RBC-ENTMCNC: 32.6 PG — HIGH (ref 27–31)
MCHC RBC-ENTMCNC: 35 G/DL — SIGNIFICANT CHANGE UP (ref 32–37)
MCV RBC AUTO: 93.3 FL — SIGNIFICANT CHANGE UP (ref 81–99)
MONOCYTES # BLD AUTO: 0.53 K/UL — SIGNIFICANT CHANGE UP (ref 0.1–0.6)
MONOCYTES NFR BLD AUTO: 7.4 % — SIGNIFICANT CHANGE UP (ref 1.7–9.3)
NEUTROPHILS # BLD AUTO: 4.11 K/UL — SIGNIFICANT CHANGE UP (ref 1.4–6.5)
NEUTROPHILS NFR BLD AUTO: 57.4 % — SIGNIFICANT CHANGE UP (ref 42.2–75.2)
NRBC # BLD: 0 /100 WBCS — SIGNIFICANT CHANGE UP (ref 0–0)
PLATELET # BLD AUTO: 266 K/UL — SIGNIFICANT CHANGE UP (ref 130–400)
POTASSIUM SERPL-MCNC: 4.4 MMOL/L — SIGNIFICANT CHANGE UP (ref 3.5–5)
POTASSIUM SERPL-SCNC: 4.4 MMOL/L — SIGNIFICANT CHANGE UP (ref 3.5–5)
PROT SERPL-MCNC: 6.9 G/DL — SIGNIFICANT CHANGE UP (ref 6–8)
RBC # BLD: 3.86 M/UL — LOW (ref 4.2–5.4)
RBC # FLD: 12.9 % — SIGNIFICANT CHANGE UP (ref 11.5–14.5)
SODIUM SERPL-SCNC: 140 MMOL/L — SIGNIFICANT CHANGE UP (ref 135–146)
WBC # BLD: 7.15 K/UL — SIGNIFICANT CHANGE UP (ref 4.8–10.8)
WBC # FLD AUTO: 7.15 K/UL — SIGNIFICANT CHANGE UP (ref 4.8–10.8)

## 2020-01-28 PROCEDURE — 99232 SBSQ HOSP IP/OBS MODERATE 35: CPT

## 2020-01-28 RX ORDER — ATORVASTATIN CALCIUM 80 MG/1
1 TABLET, FILM COATED ORAL
Qty: 0 | Refills: 0 | DISCHARGE
Start: 2020-01-28

## 2020-01-28 RX ORDER — DONEPEZIL HYDROCHLORIDE 10 MG/1
1 TABLET, FILM COATED ORAL
Qty: 0 | Refills: 0 | DISCHARGE
Start: 2020-01-28

## 2020-01-28 RX ORDER — LISINOPRIL 2.5 MG/1
1 TABLET ORAL
Qty: 0 | Refills: 0 | DISCHARGE
Start: 2020-01-28

## 2020-01-28 RX ADMIN — Medication 1 APPLICATION(S): at 21:20

## 2020-01-28 RX ADMIN — Medication 25 MILLIGRAM(S): at 05:31

## 2020-01-28 RX ADMIN — DONEPEZIL HYDROCHLORIDE 5 MILLIGRAM(S): 10 TABLET, FILM COATED ORAL at 21:20

## 2020-01-28 RX ADMIN — ATORVASTATIN CALCIUM 40 MILLIGRAM(S): 80 TABLET, FILM COATED ORAL at 21:20

## 2020-01-28 RX ADMIN — Medication 40 MILLIGRAM(S): at 11:08

## 2020-01-28 RX ADMIN — ENOXAPARIN SODIUM 40 MILLIGRAM(S): 100 INJECTION SUBCUTANEOUS at 11:08

## 2020-01-28 RX ADMIN — LISINOPRIL 10 MILLIGRAM(S): 2.5 TABLET ORAL at 05:31

## 2020-01-28 NOTE — PROGRESS NOTE ADULT - ASSESSMENT
69 female hx of HTN, DM, CVA with right sided weakness presented with chief c/o right sided body pain and unable to ambulate after fall one hour prior to presentation.     # Acute delirium, likely secondary to UTI  - multiple risk factors for delirium: dementia, previous CVA, hospital stay  - found to have klebsiella bacteruria on admission; not treated because patient denied symptoms  - started on ceftriaxone when patient became confused/agitated; completed 3 days    # S/P Mechanical fall. History of recurrent fall and functional decline  - trauma work up is negative.   - difficulty ambulating and recurrent falls   - Tramadol PRN for pain  - Physiatry recommended SNF; pending placement    # Left eye tearing  - resolved  - artificial tears prn  - lacrilube qhs    # HTN  - continue lisinopril and HCTZ    # History of CVA   - continue statin     #DASH Diet   #DVT PPX: lovenox  #Activity: as tolerated  #Dispo: pending placement

## 2020-01-28 NOTE — DISCHARGE NOTE PROVIDER - HOSPITAL COURSE
69 female hx of HTN, DM, CVA with right sided weakness presented with chief c/o right sided body pain and unable to ambulate after fall one hour prior to presentation.         # Acute delirium, likely secondary to UTI    - multiple risk factors for delirium: dementia, previous CVA, hospital stay    - found to have klebsiella bacteruria on admission; not treated because patient denied symptoms    - started on ceftriaxone when patient became confused/agitated; completed 3 days        # S/P Mechanical fall. History of recurrent fall and functional decline    - trauma work up is negative.     - difficulty ambulating and recurrent falls     - Tramadol PRN for pain    - cleared for discharge to SNF        # Left eye tearing    - no conjunctival injection    - artificial tears prn    - lacrilube qhs        # HTN    - continue lisinopril and HCTZ        # History of CVA     - continue statin         #DASH Diet     #DVT PPX: lovenox    #Activity: as tolerated    #Dispo: SNF

## 2020-01-28 NOTE — DISCHARGE NOTE PROVIDER - NSDCCPCAREPLAN_GEN_ALL_CORE_FT
PRINCIPAL DISCHARGE DIAGNOSIS  Diagnosis: Frequent falls  Assessment and Plan of Treatment: The workup for trauma following your most recent fall was negative. Given your history of stroke and the continued risk for fall, you will be discharged to a skilled nursing facility. Please work with your doctors and physical therapists and follow their recommendations in order to ensure safe ambulation.      SECONDARY DISCHARGE DIAGNOSES  Diagnosis: UTI (urinary tract infection)  Assessment and Plan of Treatment: You were treated for a urinary tract infection. You have completed a course of antibiotics. There is no need for

## 2020-01-28 NOTE — DISCHARGE NOTE PROVIDER - NSDCMRMEDTOKEN_GEN_ALL_CORE_FT
atorvastatin 40 mg oral tablet: 1 tab(s) orally once a day (at bedtime)  donepezil 5 mg oral tablet: 1 tab(s) orally once a day (at bedtime)  hydroCHLOROthiazide 25 mg oral tablet: 1 tab(s) orally once a day  lisinopril 10 mg oral tablet: 1 tab(s) orally once a day  ocular lubricant ophthalmic ointment: 1 application to each affected eye once a day (at bedtime)  ocular lubricant ophthalmic solution: 1 drop(s) to each affected eye 5 times a day, As needed, Dry Eyes  PARoxetine 40 mg oral tablet: 1 tab(s) orally once a day aspirin 81 mg oral delayed release tablet: 1 tab(s) orally once a day  atorvastatin 40 mg oral tablet: 1 tab(s) orally once a day (at bedtime)  donepezil 5 mg oral tablet: 1 tab(s) orally once a day (at bedtime)  hydroCHLOROthiazide 25 mg oral tablet: 1 tab(s) orally once a day  lisinopril 10 mg oral tablet: 1 tab(s) orally once a day  ocular lubricant ophthalmic ointment: 1 application to each affected eye once a day (at bedtime)  ocular lubricant ophthalmic solution: 1 drop(s) to each affected eye 5 times a day, As needed, Dry Eyes  PARoxetine 40 mg oral tablet: 1 tab(s) orally once a day

## 2020-01-28 NOTE — PROGRESS NOTE ADULT - SUBJECTIVE AND OBJECTIVE BOX
Hospital Day:  7d    Subjective:    Patient is a 69y old  Female who presents with a chief complaint of fall (28 Jan 2020 08:00)    There were no acute overnight events. The patient was seen and examined at the bedside. No complaints. Denies fever, chills, headache, dizziness, chest pain, palpitations, shortness of breath, abdominal pain, nausea, vomiting, diarrhea.    Past Medical Hx:   HTN (hypertension)  CVA (cerebral vascular accident)    Past Sx:  No significant past surgical history    Allergies:  No Known Allergies    Current Meds:   Standng Meds:  atorvastatin 40 milliGRAM(s) Oral at bedtime  chlorhexidine 4% Liquid 1 Application(s) Topical <User Schedule>  donepezil 5 milliGRAM(s) Oral at bedtime  enoxaparin Injectable 40 milliGRAM(s) SubCutaneous daily  hydrochlorothiazide 25 milliGRAM(s) Oral daily  influenza   Vaccine 0.5 milliLiter(s) IntraMuscular once  lisinopril 10 milliGRAM(s) Oral daily  PARoxetine 40 milliGRAM(s) Oral daily  petrolatum Ophthalmic Ointment 1 Application(s) Both EYES at bedtime    PRN Meds:  artificial  tears Solution 1 Drop(s) Both EYES five times a day PRN Dry Eyes  traMADol 25 milliGRAM(s) Oral every 8 hours PRN Severe Pain (7 - 10)    HOME MEDICATIONS:  atorvastatin 40 mg oral tablet: 1 tab(s) orally once a day (at bedtime)  donepezil 5 mg oral tablet: 1 tab(s) orally once a day (at bedtime)  hydroCHLOROthiazide 25 mg oral tablet: 1 tab(s) orally once a day  lisinopril 10 mg oral tablet: 1 tab(s) orally once a day  ocular lubricant ophthalmic ointment: 1 application to each affected eye once a day (at bedtime)  ocular lubricant ophthalmic solution: 1 drop(s) to each affected eye 5 times a day, As needed, Dry Eyes  PARoxetine 40 mg oral tablet: 1 tab(s) orally once a day      Vital Signs:   T(F): 97.1 (01-28-20 @ 05:24), Max: 97.1 (01-28-20 @ 05:24)  HR: 73 (01-28-20 @ 05:24) (67 - 73)  BP: 152/64 (01-28-20 @ 05:24) (126/56 - 152/64)  RR: 18 (01-28-20 @ 05:24) (18 - 18)  SpO2: --        Physical Exam:   General: Patient resting comfortably in bed, NAD  HEENT: NCAT, conjunctiva clear, sclera white, PEERLA, EOMI, moist mucous membranes, normal orophaynx  Neck: No masses, no JVD, no cervical lymphadenopathy  Respiratory: good inspiratory effort; lungs clear to auscultation bilaterally  CV: Normal rate, regular rhythm, normal S1/S2, no rubs, gallops, murmurs  GI: abdomen soft, non-tender, non-distended, no masses, normal bowel sounds  Extremities: Well-perfused, no clubbing, no cyanosis, no lower extremity edema bilaterally  Skin: warm and dry  Neurology: AAOx3, mentating appropriately, follows commands, nonfocal    Labs:                         12.6   7.60  )-----------( 256      ( 27 Jan 2020 07:33 )             37.6       27 Jan 2020 07:33    141    |  100    |  25     ----------------------------<  125    4.4     |  27     |  0.9      Ca    9.8        27 Jan 2020 07:33    TPro  7.1    /  Alb  4.4    /  TBili  0.8    /  DBili  x      /  AST  23     /  ALT  19     /  AlkPhos  92     27 Jan 2020 07:33                            Radiology: Hospital Day:  7d    Subjective:    Patient is a 69y old  Female who presents with a chief complaint of fall (28 Jan 2020 08:00)    There were no acute overnight events. The patient was seen and examined at the bedside. No complaints. Denies fever, chills, headache, dizziness, chest pain, palpitations, shortness of breath, abdominal pain, nausea, vomiting, diarrhea.    Past Medical Hx:   HTN (hypertension)  CVA (cerebral vascular accident)    Past Sx:  No significant past surgical history    Allergies:  No Known Allergies    Current Meds:   Standng Meds:  atorvastatin 40 milliGRAM(s) Oral at bedtime  chlorhexidine 4% Liquid 1 Application(s) Topical <User Schedule>  donepezil 5 milliGRAM(s) Oral at bedtime  enoxaparin Injectable 40 milliGRAM(s) SubCutaneous daily  hydrochlorothiazide 25 milliGRAM(s) Oral daily  influenza   Vaccine 0.5 milliLiter(s) IntraMuscular once  lisinopril 10 milliGRAM(s) Oral daily  PARoxetine 40 milliGRAM(s) Oral daily  petrolatum Ophthalmic Ointment 1 Application(s) Both EYES at bedtime    PRN Meds:  artificial  tears Solution 1 Drop(s) Both EYES five times a day PRN Dry Eyes  traMADol 25 milliGRAM(s) Oral every 8 hours PRN Severe Pain (7 - 10)    HOME MEDICATIONS:  atorvastatin 40 mg oral tablet: 1 tab(s) orally once a day (at bedtime)  donepezil 5 mg oral tablet: 1 tab(s) orally once a day (at bedtime)  hydroCHLOROthiazide 25 mg oral tablet: 1 tab(s) orally once a day  lisinopril 10 mg oral tablet: 1 tab(s) orally once a day  ocular lubricant ophthalmic ointment: 1 application to each affected eye once a day (at bedtime)  ocular lubricant ophthalmic solution: 1 drop(s) to each affected eye 5 times a day, As needed, Dry Eyes  PARoxetine 40 mg oral tablet: 1 tab(s) orally once a day      Vital Signs:   T(F): 97.1 (01-28-20 @ 05:24), Max: 97.1 (01-28-20 @ 05:24)  HR: 73 (01-28-20 @ 05:24) (67 - 73)  BP: 152/64 (01-28-20 @ 05:24) (126/56 - 152/64)  RR: 18 (01-28-20 @ 05:24) (18 - 18)  SpO2: --        Physical Exam:   General: Patient resting comfortably in bed, NAD  HEENT: NCAT, conjunctiva clear, sclera white, PEERLA, EOMI, moist mucous membranes, normal orophaynx  Neck: No masses, no JVD, no cervical lymphadenopathy  Respiratory: good inspiratory effort; lungs clear to auscultation bilaterally  CV: Normal rate, regular rhythm, normal S1/S2, no rubs, gallops, murmurs  GI: abdomen soft, non-tender, non-distended, no masses, normal bowel sounds  Extremities: Well-perfused, no clubbing, no cyanosis, no lower extremity edema bilaterally  Skin: warm and dry  Neurology: AAOx3, follows commands    Labs:                         12.6   7.60  )-----------( 256      ( 27 Jan 2020 07:33 )             37.6       27 Jan 2020 07:33    141    |  100    |  25     ----------------------------<  125    4.4     |  27     |  0.9      Ca    9.8        27 Jan 2020 07:33    TPro  7.1    /  Alb  4.4    /  TBili  0.8    /  DBili  x      /  AST  23     /  ALT  19     /  AlkPhos  92     27 Jan 2020 07:33 Hospital Day:  7d    Subjective:    Patient is a 69y old  Female who presents with a chief complaint of fall (28 Jan 2020 08:00)    There were no acute overnight events. The patient was seen and examined at the bedside. No complaints. Denies fever, chills, headache, dizziness, chest pain, palpitations, shortness of breath, abdominal pain, nausea, vomiting, diarrhea.    Past Medical Hx:   HTN (hypertension)  CVA (cerebral vascular accident)    Past Sx:  No significant past surgical history    Allergies:  No Known Allergies    Current Meds:   Standng Meds:  atorvastatin 40 milliGRAM(s) Oral at bedtime  chlorhexidine 4% Liquid 1 Application(s) Topical <User Schedule>  donepezil 5 milliGRAM(s) Oral at bedtime  enoxaparin Injectable 40 milliGRAM(s) SubCutaneous daily  hydrochlorothiazide 25 milliGRAM(s) Oral daily  influenza   Vaccine 0.5 milliLiter(s) IntraMuscular once  lisinopril 10 milliGRAM(s) Oral daily  PARoxetine 40 milliGRAM(s) Oral daily  petrolatum Ophthalmic Ointment 1 Application(s) Both EYES at bedtime    PRN Meds:  artificial  tears Solution 1 Drop(s) Both EYES five times a day PRN Dry Eyes  traMADol 25 milliGRAM(s) Oral every 8 hours PRN Severe Pain (7 - 10)    HOME MEDICATIONS:  atorvastatin 40 mg oral tablet: 1 tab(s) orally once a day (at bedtime)  donepezil 5 mg oral tablet: 1 tab(s) orally once a day (at bedtime)  hydroCHLOROthiazide 25 mg oral tablet: 1 tab(s) orally once a day  lisinopril 10 mg oral tablet: 1 tab(s) orally once a day  ocular lubricant ophthalmic ointment: 1 application to each affected eye once a day (at bedtime)  ocular lubricant ophthalmic solution: 1 drop(s) to each affected eye 5 times a day, As needed, Dry Eyes  PARoxetine 40 mg oral tablet: 1 tab(s) orally once a day      Vital Signs:   T(F): 97.1 (01-28-20 @ 05:24), Max: 97.1 (01-28-20 @ 05:24)  HR: 73 (01-28-20 @ 05:24) (67 - 73)  BP: 152/64 (01-28-20 @ 05:24) (126/56 - 152/64)  RR: 18 (01-28-20 @ 05:24) (18 - 18)  SpO2: --        Physical Exam:   General: Patient resting comfortably in bed, NAD  HEENT: NCAT, conjunctiva clear, sclera white, PEERLA, EOMI, moist mucous membranes, normal orophaynx  Neck: No masses, no JVD, no cervical lymphadenopathy  Respiratory: good inspiratory effort; lungs clear to auscultation bilaterally  CV: Normal rate, regular rhythm, normal S1/S2, no rubs, gallops, murmurs  GI: abdomen soft, non-tender, non-distended, no masses, normal bowel sounds  Extremities: Well-perfused, no clubbing, no cyanosis, no lower extremity edema bilaterally  Skin: warm and dry  Neurology: AAO , follows commands    Labs:                         12.6   7.60  )-----------( 256      ( 27 Jan 2020 07:33 )             37.6       27 Jan 2020 07:33    141    |  100    |  25     ----------------------------<  125    4.4     |  27     |  0.9      Ca    9.8        27 Jan 2020 07:33    TPro  7.1    /  Alb  4.4    /  TBili  0.8    /  DBili  x      /  AST  23     /  ALT  19     /  AlkPhos  92     27 Jan 2020 07:33

## 2020-01-28 NOTE — PROGRESS NOTE ADULT - ATTENDING COMMENTS
I have seen and evaluated the patient.  I have reviewed the patient's history and physical examination findings with the resident. I have also discussed the diagnosis and treatment plan with the resident and I agree with the information documented in the resident's note.    Pending placement.  No active medical issues  Suspected UTI resolved in my clinical opinion, no ABX necessary any further

## 2020-01-29 LAB
ANION GAP SERPL CALC-SCNC: 15 MMOL/L — HIGH (ref 7–14)
BASOPHILS # BLD AUTO: 0.03 K/UL — SIGNIFICANT CHANGE UP (ref 0–0.2)
BASOPHILS NFR BLD AUTO: 0.4 % — SIGNIFICANT CHANGE UP (ref 0–1)
BUN SERPL-MCNC: 22 MG/DL — HIGH (ref 10–20)
CALCIUM SERPL-MCNC: 9.6 MG/DL — SIGNIFICANT CHANGE UP (ref 8.5–10.1)
CHLORIDE SERPL-SCNC: 99 MMOL/L — SIGNIFICANT CHANGE UP (ref 98–110)
CO2 SERPL-SCNC: 25 MMOL/L — SIGNIFICANT CHANGE UP (ref 17–32)
CREAT SERPL-MCNC: 1 MG/DL — SIGNIFICANT CHANGE UP (ref 0.7–1.5)
EOSINOPHIL # BLD AUTO: 0.16 K/UL — SIGNIFICANT CHANGE UP (ref 0–0.7)
EOSINOPHIL NFR BLD AUTO: 2.1 % — SIGNIFICANT CHANGE UP (ref 0–8)
GLUCOSE SERPL-MCNC: 110 MG/DL — HIGH (ref 70–99)
HCT VFR BLD CALC: 37.7 % — SIGNIFICANT CHANGE UP (ref 37–47)
HGB BLD-MCNC: 12.7 G/DL — SIGNIFICANT CHANGE UP (ref 12–16)
IMM GRANULOCYTES NFR BLD AUTO: 0.3 % — SIGNIFICANT CHANGE UP (ref 0.1–0.3)
LYMPHOCYTES # BLD AUTO: 2.05 K/UL — SIGNIFICANT CHANGE UP (ref 1.2–3.4)
LYMPHOCYTES # BLD AUTO: 26.8 % — SIGNIFICANT CHANGE UP (ref 20.5–51.1)
MCHC RBC-ENTMCNC: 31.8 PG — HIGH (ref 27–31)
MCHC RBC-ENTMCNC: 33.7 G/DL — SIGNIFICANT CHANGE UP (ref 32–37)
MCV RBC AUTO: 94.5 FL — SIGNIFICANT CHANGE UP (ref 81–99)
MONOCYTES # BLD AUTO: 0.64 K/UL — HIGH (ref 0.1–0.6)
MONOCYTES NFR BLD AUTO: 8.4 % — SIGNIFICANT CHANGE UP (ref 1.7–9.3)
NEUTROPHILS # BLD AUTO: 4.75 K/UL — SIGNIFICANT CHANGE UP (ref 1.4–6.5)
NEUTROPHILS NFR BLD AUTO: 62 % — SIGNIFICANT CHANGE UP (ref 42.2–75.2)
NRBC # BLD: 0 /100 WBCS — SIGNIFICANT CHANGE UP (ref 0–0)
PLATELET # BLD AUTO: 275 K/UL — SIGNIFICANT CHANGE UP (ref 130–400)
POTASSIUM SERPL-MCNC: 4.8 MMOL/L — SIGNIFICANT CHANGE UP (ref 3.5–5)
POTASSIUM SERPL-SCNC: 4.8 MMOL/L — SIGNIFICANT CHANGE UP (ref 3.5–5)
RBC # BLD: 3.99 M/UL — LOW (ref 4.2–5.4)
RBC # FLD: 12.5 % — SIGNIFICANT CHANGE UP (ref 11.5–14.5)
SODIUM SERPL-SCNC: 139 MMOL/L — SIGNIFICANT CHANGE UP (ref 135–146)
WBC # BLD: 7.65 K/UL — SIGNIFICANT CHANGE UP (ref 4.8–10.8)
WBC # FLD AUTO: 7.65 K/UL — SIGNIFICANT CHANGE UP (ref 4.8–10.8)

## 2020-01-29 PROCEDURE — 99232 SBSQ HOSP IP/OBS MODERATE 35: CPT

## 2020-01-29 RX ADMIN — ENOXAPARIN SODIUM 40 MILLIGRAM(S): 100 INJECTION SUBCUTANEOUS at 11:18

## 2020-01-29 RX ADMIN — LISINOPRIL 10 MILLIGRAM(S): 2.5 TABLET ORAL at 06:35

## 2020-01-29 RX ADMIN — Medication 1 DROP(S): at 21:12

## 2020-01-29 RX ADMIN — Medication 40 MILLIGRAM(S): at 11:18

## 2020-01-29 RX ADMIN — CHLORHEXIDINE GLUCONATE 1 APPLICATION(S): 213 SOLUTION TOPICAL at 06:35

## 2020-01-29 RX ADMIN — Medication 25 MILLIGRAM(S): at 06:35

## 2020-01-29 RX ADMIN — ATORVASTATIN CALCIUM 40 MILLIGRAM(S): 80 TABLET, FILM COATED ORAL at 21:07

## 2020-01-29 RX ADMIN — DONEPEZIL HYDROCHLORIDE 5 MILLIGRAM(S): 10 TABLET, FILM COATED ORAL at 21:07

## 2020-01-29 RX ADMIN — Medication 1 APPLICATION(S): at 21:07

## 2020-01-29 NOTE — PROGRESS NOTE ADULT - SUBJECTIVE AND OBJECTIVE BOX
Hospital Day:  8d    Subjective:    Patient is a 69y old  Female who presents with a chief complaint of fall (29 Jan 2020 16:32)    There were no acute overnight events. The patient was seen and examined at the bedside. Continues to be intermittently confused/agitated. No new complaints.     Past Medical Hx:   HTN (hypertension)  CVA (cerebral vascular accident)    Past Sx:  No significant past surgical history    Allergies:  No Known Allergies    Current Meds:   Standng Meds:  atorvastatin 40 milliGRAM(s) Oral at bedtime  chlorhexidine 4% Liquid 1 Application(s) Topical <User Schedule>  donepezil 5 milliGRAM(s) Oral at bedtime  enoxaparin Injectable 40 milliGRAM(s) SubCutaneous daily  hydrochlorothiazide 25 milliGRAM(s) Oral daily  influenza   Vaccine 0.5 milliLiter(s) IntraMuscular once  lisinopril 10 milliGRAM(s) Oral daily  PARoxetine 40 milliGRAM(s) Oral daily  petrolatum Ophthalmic Ointment 1 Application(s) Both EYES at bedtime    PRN Meds:  artificial  tears Solution 1 Drop(s) Both EYES five times a day PRN Dry Eyes  traMADol 25 milliGRAM(s) Oral every 8 hours PRN Severe Pain (7 - 10)    HOME MEDICATIONS:  atorvastatin 40 mg oral tablet: 1 tab(s) orally once a day (at bedtime)  donepezil 5 mg oral tablet: 1 tab(s) orally once a day (at bedtime)  hydroCHLOROthiazide 25 mg oral tablet: 1 tab(s) orally once a day  lisinopril 10 mg oral tablet: 1 tab(s) orally once a day  ocular lubricant ophthalmic ointment: 1 application to each affected eye once a day (at bedtime)  ocular lubricant ophthalmic solution: 1 drop(s) to each affected eye 5 times a day, As needed, Dry Eyes  PARoxetine 40 mg oral tablet: 1 tab(s) orally once a day      Vital Signs:   T(F): 97 (01-29-20 @ 12:30), Max: 97.9 (01-28-20 @ 21:00)  HR: 69 (01-29-20 @ 12:30) (63 - 69)  BP: 117/57 (01-29-20 @ 12:30) (113/67 - 117/57)  RR: 19 (01-29-20 @ 12:30) (18 - 19)  SpO2: --        Physical Exam:   General: Patient resting comfortably in bed, NAD  HEENT: NCAT, conjunctiva clear, sclera white, PEERLA, EOMI, moist mucous membranes, normal orophaynx  Neck: No masses, no JVD, no cervical lymphadenopathy  Respiratory: good inspiratory effort; lungs clear to auscultation bilaterally  CV: Normal rate, regular rhythm, normal S1/S2, no rubs, gallops, murmurs  GI: abdomen soft, non-tender, non-distended, no masses, normal bowel sounds  Extremities: Well-perfused, no clubbing, no cyanosis, no lower extremity edema bilaterally  Skin: warm and dry  Neurology: AAOx3, follows commands    Labs:                         12.7   7.65  )-----------( 275      ( 29 Jan 2020 07:08 )             37.7     Neutophil% 62.0, Lymphocyte% 26.8, Monocyte% 8.4, Bands% 0.3 01-29-20 @ 07:08    29 Jan 2020 07:08    139    |  99     |  22     ----------------------------<  110    4.8     |  25     |  1.0      Ca    9.6        29 Jan 2020 07:08    TPro  6.9    /  Alb  4.2    /  TBili  0.9    /  DBili  x      /  AST  23     /  ALT  18     /  AlkPhos  92     28 Jan 2020 07:23

## 2020-01-29 NOTE — PROGRESS NOTE ADULT - ASSESSMENT
Patient is a 69y old  Female who presents with a chief complaint of fall (28 Jan 2020 08:12)    #Recurrent mechanical falls  pt rehab    #HTN  BP: 117/57 (29 Jan 2020 12:30) (113/67 - 117/57), controlled     #H/o CVA on statin     Progress Note Handoff    Pending:  placement    Family discussion: patient is of sound mind    Disposition: STR

## 2020-01-29 NOTE — PROGRESS NOTE ADULT - SUBJECTIVE AND OBJECTIVE BOX
[place]  69y  [facility] [address] [place] [address] [address]      Patient is a 69y old  Female who presents with a chief complaint of fall (28 Jan 2020 08:12)      INTERVAL HPI/OVERNIGHT EVENTS:    no acute events overnight     REVIEW OF SYSTEMS:  ROS neg    T(C): 36.1 (01-29-20 @ 12:30), Max: 36.6 (01-28-20 @ 21:00)  HR: 69 (01-29-20 @ 12:30) (63 - 69)  BP: 117/57 (01-29-20 @ 12:30) (113/67 - 117/57)  RR: 19 (01-29-20 @ 12:30) (18 - 19)  SpO2: --  Wt(kg): --Vital Signs Last 24 Hrs  T(C): 36.1 (29 Jan 2020 12:30), Max: 36.6 (28 Jan 2020 21:00)  T(F): 97 (29 Jan 2020 12:30), Max: 97.9 (28 Jan 2020 21:00)  HR: 69 (29 Jan 2020 12:30) (63 - 69)  BP: 117/57 (29 Jan 2020 12:30) (113/67 - 117/57)  BP(mean): --  RR: 19 (29 Jan 2020 12:30) (18 - 19)  SpO2: --    PHYSICAL EXAM:  GEN Lying in no acute distress  HEENT Pupils equal and reactive to light and accommodationSupple Neck  PULM Clear to auscultation bilaterally  CV s1s2 regular rate and rhythm  GI + bowel sounds nontnender  EXT no cyanosis or edema  INTEG No Lesions        LABS:                            12.7   7.65  )-----------( 275      ( 29 Jan 2020 07:08 )             37.7   01-29    139  |  99  |  22<H>  ----------------------------<  110<H>  4.8   |  25  |  1.0    Ca    9.6      29 Jan 2020 07:08    TPro  6.9  /  Alb  4.2  /  TBili  0.9  /  DBili  x   /  AST  23  /  ALT  18  /  AlkPhos  92  01-28            artificial  tears Solution 1 Drop(s) Both EYES five times a day PRN  atorvastatin 40 milliGRAM(s) Oral at bedtime  chlorhexidine 4% Liquid 1 Application(s) Topical <User Schedule>  donepezil 5 milliGRAM(s) Oral at bedtime  enoxaparin Injectable 40 milliGRAM(s) SubCutaneous daily  hydrochlorothiazide 25 milliGRAM(s) Oral daily  influenza   Vaccine 0.5 milliLiter(s) IntraMuscular once  lisinopril 10 milliGRAM(s) Oral daily  PARoxetine 40 milliGRAM(s) Oral daily  petrolatum Ophthalmic Ointment 1 Application(s) Both EYES at bedtime  traMADol 25 milliGRAM(s) Oral every 8 hours PRN      HEALTH ISSUES - PROBLEM Dx:          Case Discussed with House Staff   45 minutes spent on total encounter; more than 50% of the visit was spent counseling and/or coordinating care by the attending physician.   [place] [phone]

## 2020-01-30 PROCEDURE — 99232 SBSQ HOSP IP/OBS MODERATE 35: CPT

## 2020-01-30 RX ORDER — ASPIRIN/CALCIUM CARB/MAGNESIUM 324 MG
81 TABLET ORAL DAILY
Refills: 0 | Status: DISCONTINUED | OUTPATIENT
Start: 2020-01-30 | End: 2020-01-31

## 2020-01-30 RX ADMIN — Medication 25 MILLIGRAM(S): at 05:40

## 2020-01-30 RX ADMIN — Medication 1 DROP(S): at 11:23

## 2020-01-30 RX ADMIN — Medication 40 MILLIGRAM(S): at 11:23

## 2020-01-30 RX ADMIN — CHLORHEXIDINE GLUCONATE 1 APPLICATION(S): 213 SOLUTION TOPICAL at 05:40

## 2020-01-30 RX ADMIN — Medication 81 MILLIGRAM(S): at 11:23

## 2020-01-30 RX ADMIN — LISINOPRIL 10 MILLIGRAM(S): 2.5 TABLET ORAL at 05:40

## 2020-01-30 RX ADMIN — DONEPEZIL HYDROCHLORIDE 5 MILLIGRAM(S): 10 TABLET, FILM COATED ORAL at 21:19

## 2020-01-30 RX ADMIN — Medication 1 APPLICATION(S): at 21:19

## 2020-01-30 RX ADMIN — ATORVASTATIN CALCIUM 40 MILLIGRAM(S): 80 TABLET, FILM COATED ORAL at 21:19

## 2020-01-30 RX ADMIN — ENOXAPARIN SODIUM 40 MILLIGRAM(S): 100 INJECTION SUBCUTANEOUS at 11:23

## 2020-01-30 NOTE — PROGRESS NOTE ADULT - ASSESSMENT
a/p:    #recurrent mechanical falls  -PT  -awaiting dispo planning to STR vs Long Term---awaiting family to also aide with decision  -fall precautions  -frequent position change--oob in chair    #HTN/CAD  -controlled bp---continue current manamgnet- lisinopril, hctz (if bp remains on lower side would consider stopping hctz 2/2 risk for advanced age with associated dizziness)  -cont asa, statin    #H/o CVA--cont asa statin     #DVT./GI ppx    full code  #Progress Note Handoff  Pending (specify): awaiting dispo to facility

## 2020-01-30 NOTE — DIETITIAN INITIAL EVALUATION ADULT. - PHYSICAL APPEARANCE
obese/BMI 33.4 IBW: 105# Pt reports she weighed 144# x 1 year ago. Unsure of recent weights. BS 16 skin intact. No edema

## 2020-01-30 NOTE — DIETITIAN INITIAL EVALUATION ADULT. - FACTORS AFF FOOD INTAKE
Confused noted. Pt reports she is hungry for lunch - needs assistance w/ eating. Reports no issues chewing, but says she has difficulty swallowing large pieces of food. Reports tolerating mashed potato. Pt agreeable to have food cut up. NKFA. Did not take nutrition supplements PTA.

## 2020-01-30 NOTE — DIETITIAN INITIAL EVALUATION ADULT. - ENERGY NEEDS
calorie: 1389 - 1515 kcals/day (MSJ x 1.1 - 1.2 AF for obesity)  protein: 57 - 67 gms/day (1.2 - 1.4 gm/kg IBW for obesity)  fluid:1 ml/kcal or per LIP

## 2020-01-30 NOTE — PROGRESS NOTE ADULT - SUBJECTIVE AND OBJECTIVE BOX
SUBJECTIVE:    Patient is a 69y old Female who presents with a chief complaint of fall (29 Jan 2020 16:32)    There were no acute overnight events. The patient was seen and examined at the bedside. A/Ox3 today, though a bit lethargic. No new complaints. Feels well overall.      PAST MEDICAL & SURGICAL HISTORY  PAST MEDICAL & SURGICAL HISTORY:  HTN (hypertension)  CVA (cerebral vascular accident)  No significant past surgical history    ALLERGIES:  No Known Allergies    MEDICATIONS:  STANDING MEDICATIONS  aspirin enteric coated 81 milliGRAM(s) Oral daily  atorvastatin 40 milliGRAM(s) Oral at bedtime  chlorhexidine 4% Liquid 1 Application(s) Topical <User Schedule>  donepezil 5 milliGRAM(s) Oral at bedtime  enoxaparin Injectable 40 milliGRAM(s) SubCutaneous daily  hydrochlorothiazide 25 milliGRAM(s) Oral daily  influenza   Vaccine 0.5 milliLiter(s) IntraMuscular once  lisinopril 10 milliGRAM(s) Oral daily  PARoxetine 40 milliGRAM(s) Oral daily  petrolatum Ophthalmic Ointment 1 Application(s) Both EYES at bedtime    PRN MEDICATIONS  artificial  tears Solution 1 Drop(s) Both EYES five times a day PRN    VITALS:   T(F): 96.5  HR: 69  BP: 111/58  RR: 18  SpO2: 93%    LABS:                        12.7   7.65  )-----------( 275      ( 29 Jan 2020 07:08 )             37.7     01-29    139  |  99  |  22<H>  ----------------------------<  110<H>  4.8   |  25  |  1.0    Ca    9.6      29 Jan 2020 07:08    PHYSICAL EXAM:  GENERAL: NAD, speaks in full sentences, no signs of respiratory distress  HEAD: Atraumatic  NECK: Supple  CHEST/LUNG: Clear to auscultation bilaterally; No wheeze or crackles  HEART: S1, S2; RRR; No murmurs, rubs, or gallops  ABDOMEN: BS+; Soft, Non-tender, Non-distended  EXTREMITIES:  2+ Peripheral Pulses, No clubbing, cyanosis, or edema  PSYCH: AAOx3  NEUROLOGY: non-focal  SKIN: No rashes or lesions

## 2020-01-30 NOTE — PROGRESS NOTE ADULT - SUBJECTIVE AND OBJECTIVE BOX
Patient is a 69y old  Female who presents with a chief complaint of fall (29 Jan 2020 16:50)    HPI:  69 female hx of HTN, DM, CVA with right sided weakness present c/o right sided body pain and unable to ambulate after fall one hour prior to presentation.   patient tried to get off the bed without her walker and she fell on her back. patient complains of back and right ankle pain. denies head injury and LOC. grand aughter witnessed the fall. she is refusing to walk after the fall, family brought her to ED.   Patient had history of multiple falls in the past but didn't come to hospital for evaluation.  As per family, patient used to have 24 hours aid at [facility] and moved back to [place] recently.  Denies Chest pain SOB nausea vomiting or urinary symptoms   In ED she was hemodynamically stable , trauma work up were all negative, admitted for difficulty ambulating (21 Jan 2020 23:35)    PAST MEDICAL & SURGICAL HISTORY:  HTN (hypertension)  CVA (cerebral vascular accident)  No significant past surgical history    patient seen and examined independently on morning rounds for the first time today, chart reviewed and discussed with the medicine resident:    no overnight events---awaiting dispo plan ? str vs Long Term- as per CM and family patient had been bed bound for last +6 months     Vital Signs Last 24 Hrs  T(C): 36.3 (30 Jan 2020 04:49), Max: 36.3 (30 Jan 2020 04:49)  T(F): 97.4 (30 Jan 2020 04:49), Max: 97.4 (30 Jan 2020 04:49)  HR: 77 (30 Jan 2020 04:49) (72 - 77)  BP: 107/56 (30 Jan 2020 04:49) (107/56 - 109/53)  BP(mean): --  RR: 18 (30 Jan 2020 04:49) (18 - 18)  SpO2: 93% (30 Jan 2020 07:30) (93% - 96%)             PE:  GEN-NAD, AAOx3  PULM- Clear to auscultation bilaterally, fair air entry  CVS- +s1/s2 RRR no murmurs  GI- soft NT ND +bs, no rebound, no guarding  EXT- no edema               12.7   7.65  )-----------( 275      ( 29 Jan 2020 07:08 )             37.7     01-29    139  |  99  |  22<H>  ----------------------------<  110<H>  4.8   |  25  |  1.0    Ca    9.6      29 Jan 2020 07:08                MEDICATIONS  (STANDING):  aspirin enteric coated 81 milliGRAM(s) Oral daily  atorvastatin 40 milliGRAM(s) Oral at bedtime  chlorhexidine 4% Liquid 1 Application(s) Topical <User Schedule>  donepezil 5 milliGRAM(s) Oral at bedtime  enoxaparin Injectable 40 milliGRAM(s) SubCutaneous daily  hydrochlorothiazide 25 milliGRAM(s) Oral daily  influenza   Vaccine 0.5 milliLiter(s) IntraMuscular once  lisinopril 10 milliGRAM(s) Oral daily  PARoxetine 40 milliGRAM(s) Oral daily  petrolatum Ophthalmic Ointment 1 Application(s) Both EYES at bedtime

## 2020-01-30 NOTE — PROGRESS NOTE ADULT - ASSESSMENT
69 female hx of HTN, DM, CVA with right sided weakness presented with chief c/o right sided body pain and unable to ambulate after fall one hour prior to presentation.     # Acute delirium, likely secondary to UTI  - multiple risk factors for delirium: dementia, previous CVA, hospital stay  - found to have klebsiella bacteruria on admission; not treated because patient denied symptoms  - started on ceftriaxone when patient became confused/agitated; completed 3 days  - No further signs/symptoms of infection - feels well overall    # S/P Mechanical fall. History of recurrent fall and functional decline  - trauma work up is negative.   - difficulty ambulating and recurrent falls   - Tramadol PRN for pain (No complaints of pain today)   - Physiatry recommended SNF; pending placement    # Left eye tearing  - resolved  - artificial tears prn  - lacrilube qhs    # HTN  - Blood pressure acceptable today (111/58)  - continue lisinopril and HCTZ    # History of CVA   - continue statin     #DASH Diet   #DVT PPX: lovenox  #Activity: as tolerated  #Dispo: pending placement

## 2020-01-30 NOTE — DIETITIAN INITIAL EVALUATION ADULT. - OTHER INFO
Pt adm for fall.  Acute delirium, likely secondary to UTI. History of recurrent fall and functional decline. Pending placement to SNF. HTN/CAD.

## 2020-01-31 ENCOUNTER — TRANSCRIPTION ENCOUNTER (OUTPATIENT)
Age: 70
End: 2020-01-31

## 2020-01-31 VITALS
DIASTOLIC BLOOD PRESSURE: 52 MMHG | RESPIRATION RATE: 17 BRPM | HEART RATE: 76 BPM | TEMPERATURE: 96 F | SYSTOLIC BLOOD PRESSURE: 95 MMHG

## 2020-01-31 PROCEDURE — 99239 HOSP IP/OBS DSCHRG MGMT >30: CPT

## 2020-01-31 RX ORDER — ASPIRIN/CALCIUM CARB/MAGNESIUM 324 MG
1 TABLET ORAL
Qty: 0 | Refills: 0 | DISCHARGE
Start: 2020-01-31

## 2020-01-31 RX ADMIN — Medication 81 MILLIGRAM(S): at 12:41

## 2020-01-31 RX ADMIN — Medication 25 MILLIGRAM(S): at 05:18

## 2020-01-31 RX ADMIN — Medication 40 MILLIGRAM(S): at 12:44

## 2020-01-31 RX ADMIN — ENOXAPARIN SODIUM 40 MILLIGRAM(S): 100 INJECTION SUBCUTANEOUS at 12:42

## 2020-01-31 RX ADMIN — CHLORHEXIDINE GLUCONATE 1 APPLICATION(S): 213 SOLUTION TOPICAL at 05:21

## 2020-01-31 RX ADMIN — LISINOPRIL 10 MILLIGRAM(S): 2.5 TABLET ORAL at 05:19

## 2020-01-31 NOTE — DISCHARGE NOTE NURSING/CASE MANAGEMENT/SOCIAL WORK - PATIENT PORTAL LINK FT
You can access the FollowMyHealth Patient Portal offered by [facility] by registering at the following website: [email or web address]. By joining [employer]’s FollowMyHealth portal, you will also be able to view your health information using other applications (apps) compatible with our system.

## 2020-01-31 NOTE — PROGRESS NOTE ADULT - ASSESSMENT
69 female hx of HTN, DM, CVA with right sided weakness presented with chief c/o right sided body pain and unable to ambulate after fall one hour prior to presentation.     # Acute delirium, likely secondary to UTI  - multiple risk factors for delirium: dementia, previous CVA, hospital stay  - found to have klebsiella bacteruria on admission; not treated because patient denied symptoms  - started on ceftriaxone when patient became confused/agitated; completed 3 days  - No further signs/symptoms of infection - feels well overall    # S/P Mechanical fall. History of recurrent fall and functional decline  - trauma work up is negative.   - difficulty ambulating and recurrent falls   - Tramadol PRN for pain (No complaints of pain today)   - Physiatry recommended SNF; pending placement    # Left eye tearing  - resolved  - artificial tears prn  - lacrilube qhs    # HTN  - Blood pressure acceptable today (112/55)  - continue lisinopril and HCTZ    # Bradycardia   - HR noted to be 48 this am  - Asymptomatic   - Only one reading  - Will order TSH  - Continue to monitor     # History of CVA   - continue statin     #DASH Diet   #DVT PPX: lovenox  #Activity: as tolerated  #Dispo: pending placement

## 2020-01-31 NOTE — DISCHARGE NOTE NURSING/CASE MANAGEMENT/SOCIAL WORK - NSDCPEPTSTRK_GEN_ALL_CORE
Stroke warning signs and symptoms/Need for follow up after discharge/Prescribed medications/Risk factors for stroke/Stroke education booklet/Call 911 for stroke/Stroke support groups for patients, families, and friends/Signs and symptoms of stroke

## 2020-01-31 NOTE — PROGRESS NOTE ADULT - SUBJECTIVE AND OBJECTIVE BOX
Patient is a 69y old  Female who presents with a chief complaint of fall (29 Jan 2020 16:50)    HPI:  69 female hx of HTN, DM, CVA with right sided weakness present c/o right sided body pain and unable to ambulate after fall one hour prior to presentation.   patient tried to get off the bed without her walker and she fell on her back. patient complains of back and right ankle pain. denies head injury and LOC. grand aughter witnessed the fall. she is refusing to walk after the fall, family brought her to ED.   Patient had history of multiple falls in the past but didn't come to hospital for evaluation.  As per family, patient used to have 24 hours aid at [facility] and moved back to [place] recently.  Denies Chest pain SOB nausea vomiting or urinary symptoms   In ED she was hemodynamically stable , trauma work up were all negative, admitted for difficulty ambulating (21 Jan 2020 23:35)    PAST MEDICAL & SURGICAL HISTORY:  HTN (hypertension)  CVA (cerebral vascular accident)  No significant past surgical history    patient seen and examined independently on morning rounds, chart reviewed and discussed with the medicine resident:    no overnight events--awaiting dc to facility likely today             PE:  GEN-NAD, AAOx3  PULM- Clear to auscultation bilaterally, fair air entry  CVS- +s1/s2 RRR no murmurs  GI- soft NT ND +bs, no rebound, no guarding  EXT- no edema        Vital Signs Last 24 Hrs  T(C): 37.2 (31 Jan 2020 04:30), Max: 37.2 (31 Jan 2020 04:30)  T(F): 99 (31 Jan 2020 04:30), Max: 99 (31 Jan 2020 04:30)  HR: 50 (31 Jan 2020 09:13) (48 - 72)  BP: 112/55 (31 Jan 2020 04:30) (111/58 - 114/54)  BP(mean): --  RR: 17 (31 Jan 2020 04:30) (16 - 18)  SpO2: 93% (31 Jan 2020 08:32) (93% - 93%)    I&O's Summary      MEDICATIONS  (STANDING):  aspirin enteric coated 81 milliGRAM(s) Oral daily  atorvastatin 40 milliGRAM(s) Oral at bedtime  chlorhexidine 4% Liquid 1 Application(s) Topical <User Schedule>  donepezil 5 milliGRAM(s) Oral at bedtime  enoxaparin Injectable 40 milliGRAM(s) SubCutaneous daily  hydrochlorothiazide 25 milliGRAM(s) Oral daily  influenza   Vaccine 0.5 milliLiter(s) IntraMuscular once  lisinopril 10 milliGRAM(s) Oral daily  PARoxetine 40 milliGRAM(s) Oral daily  petrolatum Ophthalmic Ointment 1 Application(s) Both EYES at bedtime

## 2020-01-31 NOTE — PROGRESS NOTE ADULT - ASSESSMENT
a/p:  time spendt on discharge >30 minutes including coordination of discharge care    discharge today to [facility]- plan for f/u with outpatient pcp       #recurrent mechanical falls  -discharge today to [facility]   -fall precautions  -frequent position change--oob in chair    #HTN/CAD  -controlled bp---continue current manamgnet- lisinopril, hctz (if bp remains on lower side would consider stopping hctz 2/2 risk for advanced age with associated dizziness)  -cont asa, statin    #H/o CVA--cont asa statin     #DVT./GI ppx    full code  #Progress Note Handoff  Pending (specify): discharge today

## 2020-01-31 NOTE — CHART NOTE - NSCHARTNOTEFT_GEN_A_CORE
<<<RESIDENT DISCHARGE NOTE>>>     BEAU COBOS  MRN-[id number]    VITAL SIGNS:  T(F): 95.6 (01-31-20 @ 14:09), Max: 99 (01-31-20 @ 04:30)  HR: 76 (01-31-20 @ 14:09)  BP: 95/52 (01-31-20 @ 14:09)  SpO2: 93% (01-31-20 @ 08:32)      PHYSICAL EXAMINATION:  GENERAL: NAD, speaks in full sentences, no signs of respiratory distress  HEAD: Atraumatic  NECK: Supple  CHEST/LUNG: Clear to auscultation bilaterally; No wheeze or crackles  HEART: S1, S2; RRR; No murmurs, rubs, or gallops  ABDOMEN: BS+; Soft, Non-tender, Non-distended  EXTREMITIES:  2+ Peripheral Pulses, No clubbing, cyanosis, or edema  PSYCH: AAOx3  NEUROLOGY: non-focal  SKIN: No rashes or lesions    69 female hx of HTN, DM, CVA with right sided weakness presented with chief c/o right sided body pain and unable to ambulate after fall one hour prior to presentation.     # Acute delirium, likely secondary to UTI  - multiple risk factors for delirium: dementia, previous CVA, hospital stay  - found to have klebsiella bacteruria on admission; not treated because patient denied symptoms  - started on ceftriaxone when patient became confused/agitated; completed 3 days    # S/P Mechanical fall. History of recurrent fall and functional decline  - trauma work up is negative.   - difficulty ambulating and recurrent falls   - Tramadol PRN for pain  - cleared for discharge to SNF    # Left eye tearing  - no conjunctival injection  - artificial tears prn  - lacrilube qhs    # HTN  - continue lisinopril and HCTZ    # History of CVA   - continue statin         FINAL DISCHARGE INTERVIEW:  Resident(s) Present: (Name: Fei Quezada, RN Present: (Name:  ___________)    DISCHARGE MEDICATION RECONCILIATION  reviewed with Attending (Name: Dr. Harper)    DISPOSITION:   [  ] Home,    [  ] Home with Visiting Nursing Services,   [  X  ]  SNF/ NH,    [   ] Acute Rehab (4A),   [   ] Other (Specify:_________)

## 2020-02-01 LAB — TSH SERPL-MCNC: 2.02 UIU/ML — SIGNIFICANT CHANGE UP (ref 0.27–4.2)

## 2020-02-04 DIAGNOSIS — R29.6 REPEATED FALLS: ICD-10-CM

## 2020-02-04 DIAGNOSIS — G89.11 ACUTE PAIN DUE TO TRAUMA: ICD-10-CM
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
## 2020-02-04 DIAGNOSIS — R82.71 BACTERIURIA: ICD-10-CM

## 2020-02-04 DIAGNOSIS — R54 AGE-RELATED PHYSICAL DEBILITY: ICD-10-CM

## 2020-02-04 DIAGNOSIS — E11.9 TYPE 2 DIABETES MELLITUS WITHOUT COMPLICATIONS: ICD-10-CM

## 2020-02-04 DIAGNOSIS — R00.1 BRADYCARDIA, UNSPECIFIED: ICD-10-CM

## 2020-02-04 DIAGNOSIS — M54.9 DORSALGIA, UNSPECIFIED: ICD-10-CM

## 2020-02-04 DIAGNOSIS — I69.351 HEMIPLEGIA AND HEMIPARESIS FOLLOWING CEREBRAL INFARCTION AFFECTING RIGHT DOMINANT SIDE: ICD-10-CM

## 2020-02-04 DIAGNOSIS — I10 ESSENTIAL (PRIMARY) HYPERTENSION: ICD-10-CM

## 2020-02-04 DIAGNOSIS — G89.29 OTHER CHRONIC PAIN: ICD-10-CM

## 2020-02-04 DIAGNOSIS — M79.604 PAIN IN RIGHT LEG: ICD-10-CM

## 2020-02-04 DIAGNOSIS — B96.1 KLEBSIELLA PNEUMONIAE [K. PNEUMONIAE] AS THE CAUSE OF DISEASES CLASSIFIED ELSEWHERE: ICD-10-CM

## 2020-03-30 ENCOUNTER — INPATIENT (INPATIENT)
Facility: HOSPITAL | Age: 70
LOS: 4 days | Discharge: NURSING, REHAB OR LONG-TERM CARE | End: 2020-04-04
Attending: INTERNAL MEDICINE | Admitting: INTERNAL MEDICINE
Payer: MEDICARE

## 2020-03-30 VITALS
TEMPERATURE: 98 F | RESPIRATION RATE: 18 BRPM | HEART RATE: 78 BPM | OXYGEN SATURATION: 96 % | DIASTOLIC BLOOD PRESSURE: 62 MMHG | SYSTOLIC BLOOD PRESSURE: 108 MMHG

## 2020-03-30 LAB
ALBUMIN SERPL ELPH-MCNC: 4.2 G/DL — SIGNIFICANT CHANGE UP (ref 3.5–5.2)
ALP SERPL-CCNC: 96 U/L — SIGNIFICANT CHANGE UP (ref 30–115)
ALT FLD-CCNC: 16 U/L — SIGNIFICANT CHANGE UP (ref 0–41)
ANION GAP SERPL CALC-SCNC: 17 MMOL/L — HIGH (ref 7–14)
AST SERPL-CCNC: 29 U/L — SIGNIFICANT CHANGE UP (ref 0–41)
BASOPHILS # BLD AUTO: 0.02 K/UL — SIGNIFICANT CHANGE UP (ref 0–0.2)
BASOPHILS NFR BLD AUTO: 0.2 % — SIGNIFICANT CHANGE UP (ref 0–1)
BILIRUB SERPL-MCNC: 0.8 MG/DL — SIGNIFICANT CHANGE UP (ref 0.2–1.2)
BUN SERPL-MCNC: 38 MG/DL — HIGH (ref 10–20)
CALCIUM SERPL-MCNC: 9.4 MG/DL — SIGNIFICANT CHANGE UP (ref 8.5–10.1)
CHLORIDE SERPL-SCNC: 88 MMOL/L — LOW (ref 98–110)
CO2 SERPL-SCNC: 26 MMOL/L — SIGNIFICANT CHANGE UP (ref 17–32)
CREAT SERPL-MCNC: 1.3 MG/DL — SIGNIFICANT CHANGE UP (ref 0.7–1.5)
EOSINOPHIL # BLD AUTO: 0.1 K/UL — SIGNIFICANT CHANGE UP (ref 0–0.7)
EOSINOPHIL NFR BLD AUTO: 1.2 % — SIGNIFICANT CHANGE UP (ref 0–8)
ERYTHROCYTE [SEDIMENTATION RATE] IN BLOOD: 50 MM/HR — HIGH (ref 0–20)
GLUCOSE SERPL-MCNC: 185 MG/DL — HIGH (ref 70–99)
HCT VFR BLD CALC: 37 % — SIGNIFICANT CHANGE UP (ref 37–47)
HGB BLD-MCNC: 12.4 G/DL — SIGNIFICANT CHANGE UP (ref 12–16)
IMM GRANULOCYTES NFR BLD AUTO: 0.5 % — HIGH (ref 0.1–0.3)
LDH SERPL L TO P-CCNC: 414 — HIGH (ref 50–242)
LYMPHOCYTES # BLD AUTO: 1.48 K/UL — SIGNIFICANT CHANGE UP (ref 1.2–3.4)
LYMPHOCYTES # BLD AUTO: 17.3 % — LOW (ref 20.5–51.1)
MCHC RBC-ENTMCNC: 31.5 PG — HIGH (ref 27–31)
MCHC RBC-ENTMCNC: 33.5 G/DL — SIGNIFICANT CHANGE UP (ref 32–37)
MCV RBC AUTO: 93.9 FL — SIGNIFICANT CHANGE UP (ref 81–99)
MONOCYTES # BLD AUTO: 0.51 K/UL — SIGNIFICANT CHANGE UP (ref 0.1–0.6)
MONOCYTES NFR BLD AUTO: 6 % — SIGNIFICANT CHANGE UP (ref 1.7–9.3)
NEUTROPHILS # BLD AUTO: 6.42 K/UL — SIGNIFICANT CHANGE UP (ref 1.4–6.5)
NEUTROPHILS NFR BLD AUTO: 74.8 % — SIGNIFICANT CHANGE UP (ref 42.2–75.2)
NRBC # BLD: 0 /100 WBCS — SIGNIFICANT CHANGE UP (ref 0–0)
PLATELET # BLD AUTO: 215 K/UL — SIGNIFICANT CHANGE UP (ref 130–400)
POTASSIUM SERPL-MCNC: 5 MMOL/L — SIGNIFICANT CHANGE UP (ref 3.5–5)
POTASSIUM SERPL-SCNC: 5 MMOL/L — SIGNIFICANT CHANGE UP (ref 3.5–5)
PROT SERPL-MCNC: 7.3 G/DL — SIGNIFICANT CHANGE UP (ref 6–8)
RBC # BLD: 3.94 M/UL — LOW (ref 4.2–5.4)
RBC # FLD: 12.6 % — SIGNIFICANT CHANGE UP (ref 11.5–14.5)
SODIUM SERPL-SCNC: 131 MMOL/L — LOW (ref 135–146)
WBC # BLD: 8.57 K/UL — SIGNIFICANT CHANGE UP (ref 4.8–10.8)
WBC # FLD AUTO: 8.57 K/UL — SIGNIFICANT CHANGE UP (ref 4.8–10.8)

## 2020-03-30 PROCEDURE — 93010 ELECTROCARDIOGRAM REPORT: CPT | Mod: 77

## 2020-03-30 PROCEDURE — 70450 CT HEAD/BRAIN W/O DYE: CPT | Mod: 26

## 2020-03-30 PROCEDURE — 71045 X-RAY EXAM CHEST 1 VIEW: CPT | Mod: 26

## 2020-03-30 PROCEDURE — 93010 ELECTROCARDIOGRAM REPORT: CPT

## 2020-03-30 PROCEDURE — 99285 EMERGENCY DEPT VISIT HI MDM: CPT

## 2020-03-30 RX ORDER — ENOXAPARIN SODIUM 100 MG/ML
40 INJECTION SUBCUTANEOUS DAILY
Refills: 0 | Status: DISCONTINUED | OUTPATIENT
Start: 2020-03-30 | End: 2020-04-04

## 2020-03-30 RX ORDER — DONEPEZIL HYDROCHLORIDE 10 MG/1
5 TABLET, FILM COATED ORAL AT BEDTIME
Refills: 0 | Status: DISCONTINUED | OUTPATIENT
Start: 2020-03-30 | End: 2020-04-04

## 2020-03-30 RX ORDER — CHLORHEXIDINE GLUCONATE 213 G/1000ML
1 SOLUTION TOPICAL
Refills: 0 | Status: DISCONTINUED | OUTPATIENT
Start: 2020-03-30 | End: 2020-04-04

## 2020-03-30 RX ORDER — ASPIRIN/CALCIUM CARB/MAGNESIUM 324 MG
81 TABLET ORAL DAILY
Refills: 0 | Status: DISCONTINUED | OUTPATIENT
Start: 2020-03-30 | End: 2020-04-04

## 2020-03-30 RX ORDER — SODIUM CHLORIDE 9 MG/ML
1000 INJECTION, SOLUTION INTRAVENOUS ONCE
Refills: 0 | Status: COMPLETED | OUTPATIENT
Start: 2020-03-30 | End: 2020-03-30

## 2020-03-30 RX ORDER — ATORVASTATIN CALCIUM 80 MG/1
40 TABLET, FILM COATED ORAL AT BEDTIME
Refills: 0 | Status: DISCONTINUED | OUTPATIENT
Start: 2020-03-30 | End: 2020-04-04

## 2020-03-30 RX ORDER — HYDROCHLOROTHIAZIDE 25 MG
25 TABLET ORAL DAILY
Refills: 0 | Status: DISCONTINUED | OUTPATIENT
Start: 2020-03-30 | End: 2020-04-01

## 2020-03-30 RX ORDER — LISINOPRIL 2.5 MG/1
10 TABLET ORAL DAILY
Refills: 0 | Status: DISCONTINUED | OUTPATIENT
Start: 2020-03-30 | End: 2020-04-01

## 2020-03-30 RX ADMIN — SODIUM CHLORIDE 1000 MILLILITER(S): 9 INJECTION, SOLUTION INTRAVENOUS at 19:09

## 2020-03-30 NOTE — ED PROVIDER NOTE - NS ED ROS FT
Constitutional: See HPI.  Eyes: No visual changes  ENMT: . No neck pain   Cardiac: No cp  Respiratory: No cough  GI: No nausea, vomiting, diarrhea or abdominal pain.  : No dysuria, frequency or burning.  MS: No myalgia, muscle weakness, joint pain or back pain.  Neuro: see hpi  Skin: No skin rash.

## 2020-03-30 NOTE — ED PROVIDER NOTE - CARE PLAN
Principal Discharge DX:	Hypoxia  Secondary Diagnosis:	Weakness Principal Discharge DX:	Hypoxia  Secondary Diagnosis:	Weakness  Secondary Diagnosis:	Hyponatremia Principal Discharge DX:	Encephalopathy acute  Secondary Diagnosis:	Hypoxia  Secondary Diagnosis:	Hyponatremia

## 2020-03-30 NOTE — ED PROVIDER NOTE - NS_BEDUNITTYPES_ED_ALL_ED
Spoke to patient regarding lab results. Patient stated that he scheduled a cat scan and will make a follow up appointment after that is done. Patient verbalized understanding and had no further questions.   MED/SURG

## 2020-03-30 NOTE — H&P ADULT - NSHPPHYSICALEXAM_GEN_ALL_CORE
ICU Vital Signs Last 24 Hrs  T(C): 36.4 (30 Mar 2020 18:55), Max: 36.4 (30 Mar 2020 18:38)  T(F): 97.6 (30 Mar 2020 18:55), Max: 97.6 (30 Mar 2020 18:38)  HR: 74 (30 Mar 2020 18:55) (61 - 78)  BP: 105/53 (30 Mar 2020 20:47) (90/48 - 109/56)  BP(mean): --  ABP: --  ABP(mean): --  RR: 16 (30 Mar 2020 18:55) (16 - 18)  SpO2: 100% (30 Mar 2020 18:55) (92% - 100%)    PHYSICAL EXAM:    General: Not in distress. Well-developed, speaks in full sentences. AAOx1-2  HEENT: Atraumatic, normocephalic. Moist mucus membranes. PERRLA.  Cardio: Regular rate and rhythm. S1, S2 appreciated. no murmurs.  Pulm: Decreased BS bilaterally  Abdomen: Soft, non-tender, non-distended. Normoactive bowel sounds.  Extremities: No cyanosis or edema bilaterally. No calf tenderness to palpation.  Neuro: No focal deficits. Motor 5/5 throughout. Sensation intact

## 2020-03-30 NOTE — ED PROVIDER NOTE - CLINICAL SUMMARY MEDICAL DECISION MAKING FREE TEXT BOX
70 Y/O F PMHX AS DOCUMENTED BROUGHT TO ED FROM SNF WITH FACIAL DROOP AND CONFUSION. STROKE CODE PRE-ARRIVAL. CT HEAD WITH NO ACUTE PATHOLOGY. NIHSS-0 IN ED. ALL DIAGNOSTIC TESTING REVIEWED. PT LIKELY WITH COVID. PT ADMITTED TO MEDICINE.

## 2020-03-30 NOTE — H&P ADULT - ASSESSMENT
69 year old F (from NH) w/ PMH of dementia, prior CVA w/ residual R hemiparesis, HTN, DM II presenting w/ chief complaint of decreased responsiveness.    #Decreased responsiveness  - DDX includes acute stroke vs dementia vs covid-19  - CTH: no acute itnracranial pathology. No further neurological w/u as per Neuro given no cute neurological symptoms. C/w ASA and statin  - F/u covid-19 PCR. C/w airborne and contact isolation   - Tylenol prn for fever  - CXr without significant changes. No lymphopenia on labs. + mild transaminitis   - supplemental o2 to keep SaO2>92%    #HTN - c/w lisinopril, HCTZ  #DM - montirof fSqAC and qHS      Diet: DASH/ CC  Dvt Ppx: Lovenox SQ  Activity: out of bed to chair     #Dispo: Medical floor    #Code Status: Full code

## 2020-03-30 NOTE — H&P ADULT - NSHPLABSRESULTS_GEN_ALL_CORE
12.4   8.57  )-----------( 215      ( 30 Mar 2020 19:09 )             37.0       03-30    131<L>  |  88<L>  |  38<H>  ----------------------------<  185<H>  5.0   |  26  |  1.3    Ca    9.4      30 Mar 2020 19:09    TPro  7.3  /  Alb  4.2  /  TBili  0.8  /  DBili  x   /  AST  29  /  ALT  16  /  AlkPhos  96  03-30          POCT Blood Glucose.: 182 mg/dL (30 Mar 2020 18:19)    RADIOLOGY:    < from: CT Brain Stroke Protocol (03.30.20 @ 18:54) >    IMPRESSION:    No CT evidence for acute intracranial hemorrhage or large territorial infarction    < end of copied text >      < from: Xray Chest 1 View AP/PA (03.30.20 @ 20:31) >    Impression:      No radiographic evidence of acute cardiopulmonary disease.    < end of copied text >

## 2020-03-30 NOTE — CHART NOTE - NSCHARTNOTEFT_GEN_A_CORE
called for stroke code- 68 yo F w/ h/o dementia, prior CVA w/ residual RHP, HTN, DM resides in SNF found by staff with decreased responsiveness and ? facial droop unclear if acute or chronic.  Per ED mental status improving.  /62 w/ Tm100.9 and 88% on RA possible COVID+.  HCT prelim (-) for acute intracranial pathology.  Per ED unclear time of onset and unclear if current deficits acute or chronic.  Recommend clarify whether current deficits acute or chronic since pt poor historian and SNF documentation unclear.

## 2020-03-30 NOTE — ED PROVIDER NOTE - PROGRESS NOTE DETAILS
CASE D/W DR. ORTIZ. Izzy- received signout from Dr. Pope pending w/u. CT unremarkable for acute neuro findings. Hemodynamically stable. Was a stroke code 2/2 concern for RUE weakness/facial droop, however patient has no focal deficits upon my re-assessment. Per Dr. Vail, no further neuro w/u or intervention required at this time. Patient is a covid r/u. Swab sent. Hypoxia improved with NC. Signed out to Dr. Kraft covering for MAR service. Signed out to Dr. Kraft covering for MAR service. Awaiting to hear back from Dr. Román Byrnes. DR. MANFRED HARTMAN PAGED X 3. DR. VICTORIA CALLED, MESSAGE LEFT, WILL ADMIT TO HOSPITALIST.

## 2020-03-30 NOTE — ED PROVIDER NOTE - ATTENDING CONTRIBUTION TO CARE
I personally evaluated the patient. I reviewed the Resident’s or Physician Assistant’s note (as assigned above), and agree with the findings and plan except as documented in my note.  68 Y/O F HTN, DLD, GERD, CVA WITH RESIDUAL R SIDED WEAKNESS, DM, MILD DEMENTIA, IBS, FOUND POORLY RESPONSIVE IN SNF. AS PER SNF STAFF, PT WAS ONLY RESPONSIVE TO PAINFUL STIMULI. I personally evaluated the patient. I reviewed the Resident’s or Physician Assistant’s note (as assigned above), and agree with the findings and plan except as documented in my note.  70 Y/O F HTN, DLD, GERD, CVA WITH RESIDUAL R SIDED WEAKNESS, DM, MILD DEMENTIA, IBS, FOUND POORLY RESPONSIVE IN SNF. AS PER SNF STAFF, PT WAS ONLY RESPONSIVE TO PAINFUL STIMULI. STAFF NOTED A FACIAL DROOP. LAST KNOWN WELL WAS 5PM AS PER STAFF. PT UNABLE TO GIVE HX DUE TO SEVERITY OF ILLNESS. VITALS NOTED. . ALERT ORIENTED TO PERSON AND PLACE. NCAT PERRL EOMI. + L FACIAL DROOP. NORMAL SPEECH. 4/5 MOTOR STRENGTH B/L UPPER AND B/L LOWER EXT. NO PRONATOR DRIFT. NO SENSORY DEFICIT. RRR. LUNGS CLEAR B/L. ABD- SOFT NONTENDER. BACK NONTENDER. 2+ RADIAL AND PEDAL PULSES B/L. I personally evaluated the patient. I reviewed the Resident’s or Physician Assistant’s note (as assigned above), and agree with the findings and plan except as documented in my note.  70 Y/O F HTN, DLD, GERD, CVA WITH RESIDUAL R SIDED WEAKNESS, DM, MILD DEMENTIA, IBS, FOUND POORLY RESPONSIVE IN SNF. AS PER SNF STAFF, PT WAS ONLY RESPONSIVE TO PAINFUL STIMULI. STAFF NOTED A FACIAL DROOP. LAST KNOWN WELL WAS 5PM AS PER STAFF. PT UNABLE TO GIVE HX DUE TO SEVERITY OF ILLNESS. VITALS NOTED. . ALERT ORIENTED X3. NCAT PERRL EOMI. NO FACIAL DROOP. NORMAL SPEECH. 5/5 MOTOR STRENGTH B/L UPPER AND B/L LOWER EXT. NO PRONATOR DRIFT. NO SENSORY DEFICIT. RRR. LUNGS CLEAR B/L. ABD- SOFT NONTENDER. BACK NONTENDER. 2+ RADIAL AND PEDAL PULSES B/L.

## 2020-03-30 NOTE — ED PROVIDER NOTE - OBJECTIVE STATEMENT
69 year old female with nursing home was a prenotifical for a stroke code. Patient has a PMH of htn hld gerd & a previous CVA As per EMS patient was normal at approximately 5pm and around 5:30 began having right arm weakness.

## 2020-03-30 NOTE — H&P ADULT - ATTENDING COMMENTS
HPI:  69 year old F (from NH) w/ PMH of dementia, prior CVA w/ residual R hemiparesis, HTN, DM II presenting w/ chief complaint of decreased responsiveness.    Pt was sent to ED from her NH for decreased responsiveness noted at the facility. She was last seen normal at 5PM.  symptoms. Stroke code was called due to questionable facial droop. However it was canceled by neurology (Dr. Vail) as pt was without acute neurologic symptoms. CTH was negative for acute intracranial pathology. Pt developed hypoxia while in the ED to 88% on RA, improved to 96% on 2L NC. Covid PCR sent while in ED.    In the ED, VS: /72 HR 77 T97.6  RR96% on 2LNC. (30 Mar 2020 23:09)    REVIEW OF SYSTEMS: see cc/HPI  CONSTITUTIONAL: No weakness, fevers or chills  EYES/ENT: No visual changes;  No vertigo or throat pain   NECK: No pain or stiffness  RESPIRATORY: No cough, wheezing, hemoptysis; No shortness of breath  CARDIOVASCULAR: No chest pain or palpitations  GASTROINTESTINAL: No abdominal or epigastric pain. No nausea, vomiting, or hematemesis; No diarrhea or constipation. No melena or hematochezia.  GENITOURINARY: No dysuria, frequency or hematuria  NEUROLOGICAL: No numbness or weakness, see cc/ HPI - decreased responsiveness  SKIN: No itching, rashes    Physical Exam:  General: NAD but only interactive to give few demographic details   Neurology: A&Ox3, nonfocal, follows commands  Eyes: PERRLA/ EOMI  ENT/Neck: Neck supple, trachea midline, No JVD  Respiratory: CTA B/L, No wheezing, rales, rhonchi  CV: Normal rate regular rhythm, S1S2, no murmurs, rubs or gallops  Abdominal: Soft, NT, ND +BS,   Extremities: No edema, + peripheral pulses  Skin: No Rashes, Hematoma, Ecchymosis  Incisions:   Tubes:    A/P  Altered mental status / decreased responsiveness r/o TIA r/o metabolic encephalopathy r/o COVID-19 infection  -Admit to floor   -isolation  -f/u COVID-19   -Tylenol PRN  -ASA and statin  -Neurology follow up     HTN - stable   c/w current Rx    DM type II - elevated glucose  - F/s monitoring and Lispro PRN     DVT prophylaxis

## 2020-03-30 NOTE — H&P ADULT - HISTORY OF PRESENT ILLNESS
69 year old F (from NH) w/ PMH of dementia, prior CVA w/ residual R hemiparesis, HTN, DM II presenting w/ chief complaint of decreased responsiveness.    Pt was sent to ED from her NH for decreased responsiveness noted at the facility. She was last seen normal at 5PM.  symptoms. Stroke code was called due to questionable facial droop. However it was canceled by neurology (Dr. Vail) as pt was without acute neurologic symptoms. CTH was negative for acute intracranial pathology. Pt developed hypoxia while in the ED to 88% on RA, improved to 96% on 2L NC. Covid PCR sent while in ED.    In the ED, VS: /72 HR 77 T97.6  RR96% on 2LNC.

## 2020-03-31 PROBLEM — I10 ESSENTIAL (PRIMARY) HYPERTENSION: Chronic | Status: ACTIVE | Noted: 2020-01-21

## 2020-03-31 PROBLEM — I63.9 CEREBRAL INFARCTION, UNSPECIFIED: Chronic | Status: ACTIVE | Noted: 2020-01-21

## 2020-03-31 LAB
ALBUMIN SERPL ELPH-MCNC: 3.8 G/DL — SIGNIFICANT CHANGE UP (ref 3.5–5.2)
ALP SERPL-CCNC: 81 U/L — SIGNIFICANT CHANGE UP (ref 30–115)
ALT FLD-CCNC: 14 U/L — SIGNIFICANT CHANGE UP (ref 0–41)
ANION GAP SERPL CALC-SCNC: 11 MMOL/L — SIGNIFICANT CHANGE UP (ref 7–14)
AST SERPL-CCNC: 21 U/L — SIGNIFICANT CHANGE UP (ref 0–41)
BASOPHILS # BLD AUTO: 0.01 K/UL — SIGNIFICANT CHANGE UP (ref 0–0.2)
BASOPHILS NFR BLD AUTO: 0.2 % — SIGNIFICANT CHANGE UP (ref 0–1)
BILIRUB SERPL-MCNC: 0.5 MG/DL — SIGNIFICANT CHANGE UP (ref 0.2–1.2)
BUN SERPL-MCNC: 32 MG/DL — HIGH (ref 10–20)
CALCIUM SERPL-MCNC: 8.7 MG/DL — SIGNIFICANT CHANGE UP (ref 8.5–10.1)
CHLORIDE SERPL-SCNC: 95 MMOL/L — LOW (ref 98–110)
CO2 SERPL-SCNC: 30 MMOL/L — SIGNIFICANT CHANGE UP (ref 17–32)
CREAT SERPL-MCNC: 1 MG/DL — SIGNIFICANT CHANGE UP (ref 0.7–1.5)
CRP SERPL-MCNC: 1.17 MG/DL — HIGH (ref 0–0.4)
EOSINOPHIL # BLD AUTO: 0.04 K/UL — SIGNIFICANT CHANGE UP (ref 0–0.7)
EOSINOPHIL NFR BLD AUTO: 0.9 % — SIGNIFICANT CHANGE UP (ref 0–8)
GLUCOSE SERPL-MCNC: 162 MG/DL — HIGH (ref 70–99)
HCT VFR BLD CALC: 32.9 % — LOW (ref 37–47)
HGB BLD-MCNC: 10.7 G/DL — LOW (ref 12–16)
IMM GRANULOCYTES NFR BLD AUTO: 0.7 % — HIGH (ref 0.1–0.3)
LYMPHOCYTES # BLD AUTO: 0.94 K/UL — LOW (ref 1.2–3.4)
LYMPHOCYTES # BLD AUTO: 21.1 % — SIGNIFICANT CHANGE UP (ref 20.5–51.1)
MCHC RBC-ENTMCNC: 30.3 PG — SIGNIFICANT CHANGE UP (ref 27–31)
MCHC RBC-ENTMCNC: 32.5 G/DL — SIGNIFICANT CHANGE UP (ref 32–37)
MCV RBC AUTO: 93.2 FL — SIGNIFICANT CHANGE UP (ref 81–99)
MONOCYTES # BLD AUTO: 0.38 K/UL — SIGNIFICANT CHANGE UP (ref 0.1–0.6)
MONOCYTES NFR BLD AUTO: 8.5 % — SIGNIFICANT CHANGE UP (ref 1.7–9.3)
NEUTROPHILS # BLD AUTO: 3.05 K/UL — SIGNIFICANT CHANGE UP (ref 1.4–6.5)
NEUTROPHILS NFR BLD AUTO: 68.6 % — SIGNIFICANT CHANGE UP (ref 42.2–75.2)
NRBC # BLD: 0 /100 WBCS — SIGNIFICANT CHANGE UP (ref 0–0)
PLATELET # BLD AUTO: 198 K/UL — SIGNIFICANT CHANGE UP (ref 130–400)
POTASSIUM SERPL-MCNC: 3.9 MMOL/L — SIGNIFICANT CHANGE UP (ref 3.5–5)
POTASSIUM SERPL-SCNC: 3.9 MMOL/L — SIGNIFICANT CHANGE UP (ref 3.5–5)
PROCALCITONIN SERPL-MCNC: 0.05 NG/ML — SIGNIFICANT CHANGE UP (ref 0.02–0.1)
PROT SERPL-MCNC: 6.4 G/DL — SIGNIFICANT CHANGE UP (ref 6–8)
RBC # BLD: 3.53 M/UL — LOW (ref 4.2–5.4)
RBC # FLD: 12.6 % — SIGNIFICANT CHANGE UP (ref 11.5–14.5)
SARS-COV-2 RNA SPEC QL NAA+PROBE: DETECTED
SODIUM SERPL-SCNC: 136 MMOL/L — SIGNIFICANT CHANGE UP (ref 135–146)
WBC # BLD: 4.45 K/UL — LOW (ref 4.8–10.8)
WBC # FLD AUTO: 4.45 K/UL — LOW (ref 4.8–10.8)

## 2020-03-31 PROCEDURE — 99223 1ST HOSP IP/OBS HIGH 75: CPT

## 2020-03-31 RX ADMIN — ATORVASTATIN CALCIUM 40 MILLIGRAM(S): 80 TABLET, FILM COATED ORAL at 21:58

## 2020-03-31 RX ADMIN — Medication 25 MILLIGRAM(S): at 05:37

## 2020-03-31 RX ADMIN — Medication 40 MILLIGRAM(S): at 11:18

## 2020-03-31 RX ADMIN — DONEPEZIL HYDROCHLORIDE 5 MILLIGRAM(S): 10 TABLET, FILM COATED ORAL at 21:58

## 2020-03-31 RX ADMIN — CHLORHEXIDINE GLUCONATE 1 APPLICATION(S): 213 SOLUTION TOPICAL at 05:37

## 2020-03-31 RX ADMIN — ENOXAPARIN SODIUM 40 MILLIGRAM(S): 100 INJECTION SUBCUTANEOUS at 11:18

## 2020-03-31 RX ADMIN — Medication 81 MILLIGRAM(S): at 11:17

## 2020-03-31 RX ADMIN — LISINOPRIL 10 MILLIGRAM(S): 2.5 TABLET ORAL at 05:37

## 2020-03-31 NOTE — PHYSICAL THERAPY INITIAL EVALUATION ADULT - BED MOBILITY LIMITATIONS, REHAB EVAL
Pt initially cooperative however once pt reached sitting at EOB with PT assist, pt started pushing trunk posteriorly and resisting PT assist. Pt started becoming restless and actively tried to lay back down. PT safely assisted pt back to bed. PT educated pt on benefits of OOB training however pt fixated on her jello on b/s tray and eating lunch./impaired ability to control trunk for mobility/decreased ability to use arms for pushing/pulling/decreased ability to use legs for bridging/pushing

## 2020-03-31 NOTE — CHART NOTE - NSCHARTNOTEFT_GEN_A_CORE
patient admitted for hypoxia/ AMS.     acute hypoxic respiratory failure/metabolic encephalopathy patient admitted for hypoxia/ AMS.     acute hypoxic respiratory failure/metabolic encephalopathy due to COvid-19 pneumonia: patient admitted for hypoxia/ AMS. no new events reported.     acute hypoxic respiratory failure/metabolic encephalopathy due to COvid-19 pneumonia:  cw NC O2 to maintain SPO2>92%.   Covid -19 pcr is +ve.   Isolation precautions (contact, airborne, droplet)  Supportive care including  PRN analgesics, anti-tussives, anti-emetics, anti-pyretics  repeat CXR in am/ start plaquenil if CXr is getting worse.   fu CRP/ procalcitonin.    dvt ppx.

## 2020-03-31 NOTE — PHYSICAL THERAPY INITIAL EVALUATION ADULT - IMPAIRMENTS FOUND, PT EVAL
aerobic capacity/endurance/muscle strength/gait, locomotion, and balance/ergonomics and body mechanics/poor safety awareness/arousal, attention, and cognition/gross motor

## 2020-03-31 NOTE — PROGRESS NOTE ADULT - SUBJECTIVE AND OBJECTIVE BOX
BEAU COBOS 69y Female  MRN#: [id number]   CODE STATUS:__FULL______      SUBJECTIVE  Patient is a 69y old Female who presents with a chief complaint of Currently admitted to medicine with the primary diagnosis of Hypoxia    No acute events  Satting 99% on 3L NC    OBJECTIVE  PAST MEDICAL & SURGICAL HISTORY  HTN (hypertension)  CVA (cerebral vascular accident)  No significant past surgical history    ALLERGIES:  No Known Allergies    MEDICATIONS:  STANDING MEDICATIONS  aspirin enteric coated 81 milliGRAM(s) Oral daily  atorvastatin 40 milliGRAM(s) Oral at bedtime  chlorhexidine 4% Liquid 1 Application(s) Topical <User Schedule>  donepezil 5 milliGRAM(s) Oral at bedtime  enoxaparin Injectable 40 milliGRAM(s) SubCutaneous daily  hydrochlorothiazide 25 milliGRAM(s) Oral daily  lisinopril 10 milliGRAM(s) Oral daily  PARoxetine 40 milliGRAM(s) Oral daily    PRN MEDICATIONS      VITAL SIGNS: Last 24 Hours  T(C): 37 (31 Mar 2020 08:35), Max: 37 (31 Mar 2020 08:35)  T(F): 98.6 (31 Mar 2020 08:35), Max: 98.6 (31 Mar 2020 08:35)  HR: 78 (31 Mar 2020 08:35) (61 - 78)  BP: 112/54 (31 Mar 2020 08:35) (90/48 - 112/54)  BP(mean): --  RR: 20 (31 Mar 2020 08:35) (16 - 20)  SpO2: 93% (31 Mar 2020 09:50) (92% - 100%)    LABS:                        12.4   8.57  )-----------( 215      ( 30 Mar 2020 19:09 )             37.0     03-30    131<L>  |  88<L>  |  38<H>  ----------------------------<  185<H>  5.0   |  26  |  1.3    Ca    9.4      30 Mar 2020 19:09    TPro  7.3  /  Alb  4.2  /  TBili  0.8  /  DBili  x   /  AST  29  /  ALT  16  /  AlkPhos  96  03-30          Sedimentation Rate, Erythrocyte: 50 mm/Hr <H> (03-30-20 @ 19:09)          RADIOLOGY:      PHYSICAL EXAM:    GENERAL: NAD, well-developed, AAOx3  HEENT:  Atraumatic, Normocephalic. EOMI, PERRLA, conjunctiva and sclera clear, No JVD  PULMONARY: decreased breath sounds b/l, no wheezing rhonchi or rales  CARDIOVASCULAR: Regular rate and rhythm; No murmurs, rubs, or gallops  GASTROINTESTINAL: Soft, Nontender, Nondistended; Bowel sounds present  MUSCULOSKELETAL:  2+ Peripheral Pulses, No clubbing, cyanosis, or edema  NEUROLOGY: non-focal, R sided hemiparesis, R sided facial droop  SKIN: No rashes or lesions      ADMISSION SUMMARY  Patient is a 69y old Female who presents with a chief complaint of Currently admitted to medicine with the primary diagnosis of Hypoxia      ASSESSMENT & PLAN    69 year old F (from NH) w/ PMH of dementia, prior CVA w/ residual R hemiparesis, HTN, DM II presenting from nursing home w/ chief complaint of decreased responsiveness. Stroke code called but cancelled after neuro evaluation.  No new focal deficits.  CT head negative.  Patient also hypoxemic to 88% in the ED and placed on 2L NC.  COVID-19 PCR sent. Admitted for further workup.     #Decreased responsiveness/AMS  - DDX includes TIA vs metabolic encephalopathy vs COVID-19  - COVID-19 PCR pending  - follow up UA, blood cultures  - CXR clear, will repeat tomorrow  - CTH: no acute intracranial pathology. No further neurological w/u as per neuro given no acute focal deficits. C/w ASA and statin.   - C/w airborne and contact isolation   - CRP 1.15, procal .05, LDH and ESR elevated  - Supplemental O2 to keep SaO2>92%  - Cont plaquenil and monitor daily EKG    #HTN   - C/w lisinopril, HCTZ    #Dementia  - C/w donepezil    #DM   - Monitor fasting sugars  - Start insulin if fs >180    #Prior CVA  - C/w aspirin and statin    Diet: DASH/ CC  DVT ppx: Lovenox SQ  FULL CODE BEAU COBOS 69y Female  MRN#: [id number]   CODE STATUS:__FULL______      SUBJECTIVE  Patient is a 69y old Female who presents with a chief complaint of Currently admitted to medicine with the primary diagnosis of Hypoxia    No acute events  Satting 99% on 3L NC    OBJECTIVE  PAST MEDICAL & SURGICAL HISTORY  HTN (hypertension)  CVA (cerebral vascular accident)  No significant past surgical history    ALLERGIES:  No Known Allergies    MEDICATIONS:  STANDING MEDICATIONS  aspirin enteric coated 81 milliGRAM(s) Oral daily  atorvastatin 40 milliGRAM(s) Oral at bedtime  chlorhexidine 4% Liquid 1 Application(s) Topical <User Schedule>  donepezil 5 milliGRAM(s) Oral at bedtime  enoxaparin Injectable 40 milliGRAM(s) SubCutaneous daily  hydrochlorothiazide 25 milliGRAM(s) Oral daily  lisinopril 10 milliGRAM(s) Oral daily  PARoxetine 40 milliGRAM(s) Oral daily    PRN MEDICATIONS      VITAL SIGNS: Last 24 Hours  T(C): 37 (31 Mar 2020 08:35), Max: 37 (31 Mar 2020 08:35)  T(F): 98.6 (31 Mar 2020 08:35), Max: 98.6 (31 Mar 2020 08:35)  HR: 78 (31 Mar 2020 08:35) (61 - 78)  BP: 112/54 (31 Mar 2020 08:35) (90/48 - 112/54)  BP(mean): --  RR: 20 (31 Mar 2020 08:35) (16 - 20)  SpO2: 93% (31 Mar 2020 09:50) (92% - 100%)    LABS:                        12.4   8.57  )-----------( 215      ( 30 Mar 2020 19:09 )             37.0     03-30    131<L>  |  88<L>  |  38<H>  ----------------------------<  185<H>  5.0   |  26  |  1.3    Ca    9.4      30 Mar 2020 19:09    TPro  7.3  /  Alb  4.2  /  TBili  0.8  /  DBili  x   /  AST  29  /  ALT  16  /  AlkPhos  96  03-30          Sedimentation Rate, Erythrocyte: 50 mm/Hr <H> (03-30-20 @ 19:09)          RADIOLOGY:      PHYSICAL EXAM:    GENERAL: NAD, well-developed, AAOx3  HEENT:  Atraumatic, Normocephalic. EOMI, PERRLA, conjunctiva and sclera clear, No JVD  PULMONARY: decreased breath sounds b/l, no wheezing rhonchi or rales  CARDIOVASCULAR: Regular rate and rhythm; No murmurs, rubs, or gallops  GASTROINTESTINAL: Soft, Nontender, Nondistended; Bowel sounds present  MUSCULOSKELETAL:  2+ Peripheral Pulses, No clubbing, cyanosis, or edema  NEUROLOGY: non-focal, R sided hemiparesis, R sided facial droop  SKIN: No rashes or lesions      ADMISSION SUMMARY  Patient is a 69y old Female who presents with a chief complaint of Currently admitted to medicine with the primary diagnosis of Hypoxia      ASSESSMENT & PLAN    69 year old F (from NH) w/ PMH of dementia, prior CVA w/ residual R hemiparesis, HTN, DM II presenting from nursing home w/ chief complaint of decreased responsiveness. Stroke code called but cancelled after neuro evaluation.  No new focal deficits.  CT head negative.  Patient also hypoxemic to 88% in the ED and placed on 2L NC.  COVID-19 PCR sent. Admitted for further workup.     #Decreased responsiveness/AMS  - DDX includes TIA vs metabolic encephalopathy vs COVID-19  - COVID-19 PCR pending  - follow up UA, blood cultures  - CXR clear, will repeat tomorrow  - CTH: no acute intracranial pathology. No further neurological w/u as per neuro given no acute focal deficits. C/w ASA and statin.   - C/w airborne and contact isolation   - CRP 1.15, procal .05, LDH and ESR elevated  - Supplemental O2 to keep SaO2>92%  - Cont plaquenil and monitor daily EKG  - PT/rehab eval    #HTN   - C/w lisinopril, HCTZ    #Dementia  - C/w donepezil    #DM   - Monitor fasting sugars  - Start insulin if fs >180    #Prior CVA  - C/w aspirin and statin    Diet: DASH/ CC  DVT ppx: Lovenox SQ  FULL CODE  PT rehab pending  From [facility]

## 2020-03-31 NOTE — PHYSICAL THERAPY INITIAL EVALUATION ADULT - GENERAL OBSERVATIONS, REHAB EVAL
Pt rec semifowler in bed, in NAD. Pt appears very confused with limited participation during PT eval. Bed alarm non operative despite PT repositioning pt in bed and checking all sensors. ALICJA Watts notified and aware. Call bell  provided to pt, educated on safety.

## 2020-03-31 NOTE — CONSULT NOTE ADULT - ASSESSMENT
IMPRESSION: Rehab of debility due to covid 19    PRECAUTIONS: [    ] Cardiac  [   x ] Respiratory  [    ] Seizures [    ] Contact Isolation  [x    ] Droplet Isolation  [    ] Other    Weight Bearing Status: wbat    RECOMMENDATION:    Out of Bed to Chair     DVT/Decubiti Prophylaxis    REHAB PLAN:     [    x ] Bedside P/T 3-5 times a week   [     ] Bedside O/T  2-3 times a week   [     ] No Rehab Therapy Indicated   [     ]  Speech Therapy   Conditioning/ROM                                 ADL  Bed Mobility                                            Conditioning/ROM  Transfers                                                  Bed Mobility  Sitting /Standing Balance                      Transfers                                        Gait Training                                            Sitting/Standing Balance  Stair Training [   ]Applicable                 Home equipment Eval                                                                     Splinting  [   ] Only      GOALS:   ADL   [    ]   Independent         Transfers  [    ] Independent            Ambulation  [     ] Independent     [     ] With device                            [ x   ]  CG                                               [x    ]  CG                                                    [x     ] CG                            [    ] Min A                                          [    ] Min A                                                [     ] Min  A          DISCHARGE PLAN:   return to                                     [  x    ]  Short Term Rehab in Skilled Nursing Facility

## 2020-03-31 NOTE — PHYSICAL THERAPY INITIAL EVALUATION ADULT - PERTINENT HX OF CURRENT PROBLEM, REHAB EVAL
Patient is a 69y old Female who presents with a chief complaint of Currently admitted to medicine with the primary diagnosis of Hypoxia

## 2020-03-31 NOTE — PHYSICAL THERAPY INITIAL EVALUATION ADULT - LIVES WITH, PROFILE
Pt is a poor historian. Pt reports she used to live with her sister on third floor apt with elevator however answers inconsistent and unclear.

## 2020-03-31 NOTE — CONSULT NOTE ADULT - SUBJECTIVE AND OBJECTIVE BOX
Patient is a 69y old  Female who presents with a chief complaint of   HPI:  69 year old F (from NH) w/ PMH of dementia, prior CVA w/ residual R hemiparesis, HTN, DM II presenting w/ chief complaint of decreased responsiveness.    Pt was sent to ED from her NH for decreased responsiveness noted at the facility. She was last seen normal at 5PM.  symptoms. Stroke code was called due to questionable facial droop. However it was canceled by neurology (Dr. Vail) as pt was without acute neurologic symptoms. CTH was negative for acute intracranial pathology. Pt developed hypoxia while in the ED to 88% on RA, improved to 96% on 2L NC. Covid PCR sent while in ED.    In the ED, VS: /72 HR 77 T97.6  RR96% on 2LNC. (30 Mar 2020 23:09)      PAST MEDICAL & SURGICAL HISTORY:  HTN (hypertension)  CVA (cerebral vascular accident)  No significant past surgical history  DM2    Hospital Course: W/U for Covid 19    TODAY'S SUBJECTIVE & REVIEW OF SYMPTOMS:     Constitutional weakness   Cardio WNL   Resp hypoxia   GI WNL  Heme WNL  Endo WNL  Skin WNL  MSK WNL  Neuro weakness from old cva  Cognitive WNL  Psych WNL      MEDICATIONS  (STANDING):  aspirin enteric coated 81 milliGRAM(s) Oral daily  atorvastatin 40 milliGRAM(s) Oral at bedtime  chlorhexidine 4% Liquid 1 Application(s) Topical <User Schedule>  donepezil 5 milliGRAM(s) Oral at bedtime  enoxaparin Injectable 40 milliGRAM(s) SubCutaneous daily  hydrochlorothiazide 25 milliGRAM(s) Oral daily  lisinopril 10 milliGRAM(s) Oral daily  PARoxetine 40 milliGRAM(s) Oral daily    MEDICATIONS  (PRN):      FAMILY HISTORY:  FH: hypertension      Allergies    No Known Allergies    Intolerances        SOCIAL HISTORY:    [    ] Etoh  [    ] Smoking  [    ] Substance abuse     Home Environment:  [    ] Home Alone  [    ] Lives with Family  [    ] Home Health Aid    Dwelling:  [    ] Apartment  [    ] Private House  [    ] Adult Home  [ x   ] Skilled Nursing Facility      [ x   ] Short Term  [    ] Long Term  [    ] Stairs                           [    ] Elevator     FUNCTIONAL STATUS PTA: (Check all that apply)  see case management note    Vital Signs Last 24 Hrs  T(C): 37 (31 Mar 2020 08:35), Max: 37 (31 Mar 2020 08:35)  T(F): 98.6 (31 Mar 2020 08:35), Max: 98.6 (31 Mar 2020 08:35)  HR: 78 (31 Mar 2020 08:35) (61 - 78)  BP: 112/54 (31 Mar 2020 08:35) (90/48 - 112/54)  BP(mean): --  RR: 20 (31 Mar 2020 08:35) (16 - 20)  SpO2: 93% (31 Mar 2020 09:50) (92% - 100%)      FUNCTIONAL STATUS:  Bed Mobility: [   ]  Independent [    ]  Supervision [  x  ]  Needs Assistance [  ]  N/A  Transfers: [    ]  Independent [    ]  Supervision [ x   ]  Needs Assistance [    ]  N/A    Ambulation:  [    ]  Independent [    ]  Supervision [  x  ]  Needs Assistance [    ]  N/A   ADL:  [    ]   Independent [    ] Requires Assistance [    ] N/A       LABS:                        12.4   8.57  )-----------( 215      ( 30 Mar 2020 19:09 )             37.0     03-30    131<L>  |  88<L>  |  38<H>  ----------------------------<  185<H>  5.0   |  26  |  1.3    Ca    9.4      30 Mar 2020 19:09    TPro  7.3  /  Alb  4.2  /  TBili  0.8  /  DBili  x   /  AST  29  /  ALT  16  /  AlkPhos  96  03-30    PHYSICAL EXAM:    GENERAL: NAD, well-developed, AAOx3  HEENT:  Atraumatic, Normocephalic. EOMI, PERRLA, conjunctiva and sclera clear, No JVD  PULMONARY: decreased breath sounds b/l, no wheezing rhonchi or rales  CARDIOVASCULAR: Regular rate and rhythm; No murmurs, rubs, or gallops  GASTROINTESTINAL: Soft, Nontender, Nondistended; Bowel sounds present  MUSCULOSKELETAL:  2+ Peripheral Pulses, No clubbing, cyanosis, or edema  NEUROLOGY: non-focal, R sided hemiparesis, R sided facial droop  SKIN: No rashes or lesions          RADIOLOGY & ADDITIONAL STUDIES:

## 2020-04-01 LAB
ANION GAP SERPL CALC-SCNC: 13 MMOL/L — SIGNIFICANT CHANGE UP (ref 7–14)
BASOPHILS # BLD AUTO: 0.01 K/UL — SIGNIFICANT CHANGE UP (ref 0–0.2)
BASOPHILS NFR BLD AUTO: 0.2 % — SIGNIFICANT CHANGE UP (ref 0–1)
BUN SERPL-MCNC: 24 MG/DL — HIGH (ref 10–20)
CALCIUM SERPL-MCNC: 8.8 MG/DL — SIGNIFICANT CHANGE UP (ref 8.5–10.1)
CHLORIDE SERPL-SCNC: 95 MMOL/L — LOW (ref 98–110)
CO2 SERPL-SCNC: 30 MMOL/L — SIGNIFICANT CHANGE UP (ref 17–32)
CREAT SERPL-MCNC: 0.9 MG/DL — SIGNIFICANT CHANGE UP (ref 0.7–1.5)
CRP SERPL-MCNC: 2.41 MG/DL — HIGH (ref 0–0.4)
EOSINOPHIL # BLD AUTO: 0.07 K/UL — SIGNIFICANT CHANGE UP (ref 0–0.7)
EOSINOPHIL NFR BLD AUTO: 1.5 % — SIGNIFICANT CHANGE UP (ref 0–8)
GLUCOSE SERPL-MCNC: 97 MG/DL — SIGNIFICANT CHANGE UP (ref 70–99)
HCT VFR BLD CALC: 34.1 % — LOW (ref 37–47)
HGB BLD-MCNC: 11.1 G/DL — LOW (ref 12–16)
IMM GRANULOCYTES NFR BLD AUTO: 0.2 % — SIGNIFICANT CHANGE UP (ref 0.1–0.3)
LYMPHOCYTES # BLD AUTO: 1.03 K/UL — LOW (ref 1.2–3.4)
LYMPHOCYTES # BLD AUTO: 21.9 % — SIGNIFICANT CHANGE UP (ref 20.5–51.1)
MCHC RBC-ENTMCNC: 30.7 PG — SIGNIFICANT CHANGE UP (ref 27–31)
MCHC RBC-ENTMCNC: 32.6 G/DL — SIGNIFICANT CHANGE UP (ref 32–37)
MCV RBC AUTO: 94.2 FL — SIGNIFICANT CHANGE UP (ref 81–99)
MONOCYTES # BLD AUTO: 0.45 K/UL — SIGNIFICANT CHANGE UP (ref 0.1–0.6)
MONOCYTES NFR BLD AUTO: 9.6 % — HIGH (ref 1.7–9.3)
NEUTROPHILS # BLD AUTO: 3.13 K/UL — SIGNIFICANT CHANGE UP (ref 1.4–6.5)
NEUTROPHILS NFR BLD AUTO: 66.6 % — SIGNIFICANT CHANGE UP (ref 42.2–75.2)
NRBC # BLD: 0 /100 WBCS — SIGNIFICANT CHANGE UP (ref 0–0)
PLATELET # BLD AUTO: 207 K/UL — SIGNIFICANT CHANGE UP (ref 130–400)
POTASSIUM SERPL-MCNC: 4.2 MMOL/L — SIGNIFICANT CHANGE UP (ref 3.5–5)
POTASSIUM SERPL-SCNC: 4.2 MMOL/L — SIGNIFICANT CHANGE UP (ref 3.5–5)
PROCALCITONIN SERPL-MCNC: 0.22 NG/ML — HIGH (ref 0.02–0.1)
RBC # BLD: 3.62 M/UL — LOW (ref 4.2–5.4)
RBC # FLD: 12.5 % — SIGNIFICANT CHANGE UP (ref 11.5–14.5)
SODIUM SERPL-SCNC: 138 MMOL/L — SIGNIFICANT CHANGE UP (ref 135–146)
WBC # BLD: 4.7 K/UL — LOW (ref 4.8–10.8)
WBC # FLD AUTO: 4.7 K/UL — LOW (ref 4.8–10.8)

## 2020-04-01 PROCEDURE — 71045 X-RAY EXAM CHEST 1 VIEW: CPT | Mod: 26

## 2020-04-01 PROCEDURE — 99232 SBSQ HOSP IP/OBS MODERATE 35: CPT

## 2020-04-01 PROCEDURE — 93010 ELECTROCARDIOGRAM REPORT: CPT

## 2020-04-01 RX ORDER — CEFTRIAXONE 500 MG/1
1000 INJECTION, POWDER, FOR SOLUTION INTRAMUSCULAR; INTRAVENOUS ONCE
Refills: 0 | Status: COMPLETED | OUTPATIENT
Start: 2020-04-01 | End: 2020-04-01

## 2020-04-01 RX ORDER — CEFTRIAXONE 500 MG/1
1000 INJECTION, POWDER, FOR SOLUTION INTRAMUSCULAR; INTRAVENOUS EVERY 24 HOURS
Refills: 0 | Status: DISCONTINUED | OUTPATIENT
Start: 2020-04-02 | End: 2020-04-04

## 2020-04-01 RX ORDER — CEFTRIAXONE 500 MG/1
0 INJECTION, POWDER, FOR SOLUTION INTRAMUSCULAR; INTRAVENOUS
Qty: 0 | Refills: 0 | DISCHARGE
Start: 2020-04-01 | End: 2020-04-08

## 2020-04-01 RX ORDER — CEFTRIAXONE 500 MG/1
INJECTION, POWDER, FOR SOLUTION INTRAMUSCULAR; INTRAVENOUS
Refills: 0 | Status: DISCONTINUED | OUTPATIENT
Start: 2020-04-01 | End: 2020-04-04

## 2020-04-01 RX ADMIN — DONEPEZIL HYDROCHLORIDE 5 MILLIGRAM(S): 10 TABLET, FILM COATED ORAL at 20:51

## 2020-04-01 RX ADMIN — Medication 25 MILLIGRAM(S): at 05:26

## 2020-04-01 RX ADMIN — CEFTRIAXONE 100 MILLIGRAM(S): 500 INJECTION, POWDER, FOR SOLUTION INTRAMUSCULAR; INTRAVENOUS at 18:40

## 2020-04-01 RX ADMIN — CHLORHEXIDINE GLUCONATE 1 APPLICATION(S): 213 SOLUTION TOPICAL at 05:26

## 2020-04-01 RX ADMIN — LISINOPRIL 10 MILLIGRAM(S): 2.5 TABLET ORAL at 05:26

## 2020-04-01 RX ADMIN — ATORVASTATIN CALCIUM 40 MILLIGRAM(S): 80 TABLET, FILM COATED ORAL at 20:49

## 2020-04-01 RX ADMIN — Medication 81 MILLIGRAM(S): at 12:47

## 2020-04-01 RX ADMIN — Medication 40 MILLIGRAM(S): at 12:49

## 2020-04-01 RX ADMIN — ENOXAPARIN SODIUM 40 MILLIGRAM(S): 100 INJECTION SUBCUTANEOUS at 12:48

## 2020-04-01 NOTE — PROGRESS NOTE ADULT - SUBJECTIVE AND OBJECTIVE BOX
BEAU COBOS 69y Female  MRN#: [id number]   CODE STATUS:__FULL______      SUBJECTIVE  Patient is a 69y old Female who presents with a chief complaint of hypoxia (31 Mar 2020 12:51)  Currently admitted to medicine with the primary diagnosis of Hypoxia    no acute events  Satting 93% on RA      OBJECTIVE  PAST MEDICAL & SURGICAL HISTORY  HTN (hypertension)  CVA (cerebral vascular accident)  No significant past surgical history    ALLERGIES:  No Known Allergies    MEDICATIONS:  STANDING MEDICATIONS  aspirin enteric coated 81 milliGRAM(s) Oral daily  atorvastatin 40 milliGRAM(s) Oral at bedtime  chlorhexidine 4% Liquid 1 Application(s) Topical <User Schedule>  donepezil 5 milliGRAM(s) Oral at bedtime  enoxaparin Injectable 40 milliGRAM(s) SubCutaneous daily  hydrochlorothiazide 25 milliGRAM(s) Oral daily  lisinopril 10 milliGRAM(s) Oral daily  PARoxetine 40 milliGRAM(s) Oral daily    PRN MEDICATIONS      VITAL SIGNS: Last 24 Hours  T(C): 36.7 (01 Apr 2020 13:22), Max: 37.2 (01 Apr 2020 07:04)  T(F): 98 (01 Apr 2020 13:22), Max: 98.9 (01 Apr 2020 07:04)  HR: 76 (01 Apr 2020 13:22) (65 - 81)  BP: 94/51 (01 Apr 2020 13:22) (94/51 - 118/55)  BP(mean): --  RR: 20 (01 Apr 2020 13:22) (20 - 20)  SpO2: 93% (01 Apr 2020 13:22) (93% - 100%)    LABS:                        11.1   4.70  )-----------( 207      ( 01 Apr 2020 06:13 )             34.1     04-01    138  |  95<L>  |  24<H>  ----------------------------<  97  4.2   |  30  |  0.9    Ca    8.8      01 Apr 2020 06:13    TPro  6.4  /  Alb  3.8  /  TBili  0.5  /  DBili  x   /  AST  21  /  ALT  14  /  AlkPhos  81  03-31                  RADIOLOGY:      PHYSICAL EXAM:    GENERAL: NAD, well-developed, AAOx3  HEENT:  Atraumatic, Normocephalic. EOMI, PERRLA, conjunctiva and sclera clear, No JVD  PULMONARY: decreased breath sounds b/l, no wheezing rhonchi or rales  CARDIOVASCULAR: Regular rate and rhythm; No murmurs, rubs, or gallops  GASTROINTESTINAL: Soft, Nontender, Nondistended; Bowel sounds present  MUSCULOSKELETAL:  2+ Peripheral Pulses, No clubbing, cyanosis, or edema  NEUROLOGY: non-focal, R sided hemiparesis, R sided facial droop  SKIN: No rashes or lesions      ADMISSION SUMMARY  Patient is a 69y old Female who presents with a chief complaint of Currently admitted to medicine with the primary diagnosis of Hypoxia      ASSESSMENT & PLAN    69 year old F (from NH) w/ PMH of dementia, prior CVA w/ residual R hemiparesis, HTN, DM II presenting from nursing home w/ chief complaint of decreased responsiveness. Stroke code called but cancelled after neuro evaluation.  No new focal deficits.  CT head negative.  Patient also hypoxemic to 88% in the ED and placed on 2L NC.  COVID-19 PCR sent. Admitted for further workup.     #Decreased responsiveness/AMS secondary to UTI and COVID-19  - COVID-19 PCR +  - UA positive for leuk esterases, bacteria. Urine cx growing Klebsiella  - Rocephin 1g q24 hours  - blood cx pending  - CXR clear, will repeat tomorrow   - C/w airborne and contact isolation   - CRP 1.15, procal .05  - Satting 93% on RA  - Supplemental O2 to keep SaO2>92%  - PT/rehab eval    #HTN   - C/w lisinopril, HCTZ    #Dementia  - C/w donepezil    #DM   - Monitor fasting sugars  - Start insulin if fs >180    #Prior CVA  - C/w aspirin and statin    Diet: DASH/ CC  DVT ppx: Lovenox SQ  FULL CODE  SNF placement

## 2020-04-02 ENCOUNTER — TRANSCRIPTION ENCOUNTER (OUTPATIENT)
Age: 70
End: 2020-04-02

## 2020-04-02 DIAGNOSIS — I63.9 CEREBRAL INFARCTION, UNSPECIFIED: ICD-10-CM

## 2020-04-02 DIAGNOSIS — I10 ESSENTIAL (PRIMARY) HYPERTENSION: ICD-10-CM

## 2020-04-02 LAB
ANION GAP SERPL CALC-SCNC: 14 MMOL/L — SIGNIFICANT CHANGE UP (ref 7–14)
BASOPHILS # BLD AUTO: 0.02 K/UL — SIGNIFICANT CHANGE UP (ref 0–0.2)
BASOPHILS NFR BLD AUTO: 0.4 % — SIGNIFICANT CHANGE UP (ref 0–1)
BUN SERPL-MCNC: 23 MG/DL — HIGH (ref 10–20)
CALCIUM SERPL-MCNC: 8.8 MG/DL — SIGNIFICANT CHANGE UP (ref 8.5–10.1)
CHLORIDE SERPL-SCNC: 94 MMOL/L — LOW (ref 98–110)
CO2 SERPL-SCNC: 29 MMOL/L — SIGNIFICANT CHANGE UP (ref 17–32)
CREAT SERPL-MCNC: 1 MG/DL — SIGNIFICANT CHANGE UP (ref 0.7–1.5)
EOSINOPHIL # BLD AUTO: 0.07 K/UL — SIGNIFICANT CHANGE UP (ref 0–0.7)
EOSINOPHIL NFR BLD AUTO: 1.3 % — SIGNIFICANT CHANGE UP (ref 0–8)
GLUCOSE SERPL-MCNC: 101 MG/DL — HIGH (ref 70–99)
HCT VFR BLD CALC: 35 % — LOW (ref 37–47)
HGB BLD-MCNC: 11.5 G/DL — LOW (ref 12–16)
IMM GRANULOCYTES NFR BLD AUTO: 0.5 % — HIGH (ref 0.1–0.3)
LYMPHOCYTES # BLD AUTO: 1.26 K/UL — SIGNIFICANT CHANGE UP (ref 1.2–3.4)
LYMPHOCYTES # BLD AUTO: 23 % — SIGNIFICANT CHANGE UP (ref 20.5–51.1)
MCHC RBC-ENTMCNC: 30.7 PG — SIGNIFICANT CHANGE UP (ref 27–31)
MCHC RBC-ENTMCNC: 32.9 G/DL — SIGNIFICANT CHANGE UP (ref 32–37)
MCV RBC AUTO: 93.3 FL — SIGNIFICANT CHANGE UP (ref 81–99)
MONOCYTES # BLD AUTO: 0.46 K/UL — SIGNIFICANT CHANGE UP (ref 0.1–0.6)
MONOCYTES NFR BLD AUTO: 8.4 % — SIGNIFICANT CHANGE UP (ref 1.7–9.3)
NEUTROPHILS # BLD AUTO: 3.65 K/UL — SIGNIFICANT CHANGE UP (ref 1.4–6.5)
NEUTROPHILS NFR BLD AUTO: 66.4 % — SIGNIFICANT CHANGE UP (ref 42.2–75.2)
NRBC # BLD: 0 /100 WBCS — SIGNIFICANT CHANGE UP (ref 0–0)
PLATELET # BLD AUTO: 234 K/UL — SIGNIFICANT CHANGE UP (ref 130–400)
POTASSIUM SERPL-MCNC: 4.2 MMOL/L — SIGNIFICANT CHANGE UP (ref 3.5–5)
POTASSIUM SERPL-SCNC: 4.2 MMOL/L — SIGNIFICANT CHANGE UP (ref 3.5–5)
RBC # BLD: 3.75 M/UL — LOW (ref 4.2–5.4)
RBC # FLD: 12.6 % — SIGNIFICANT CHANGE UP (ref 11.5–14.5)
SODIUM SERPL-SCNC: 137 MMOL/L — SIGNIFICANT CHANGE UP (ref 135–146)
WBC # BLD: 5.49 K/UL — SIGNIFICANT CHANGE UP (ref 4.8–10.8)
WBC # FLD AUTO: 5.49 K/UL — SIGNIFICANT CHANGE UP (ref 4.8–10.8)

## 2020-04-02 PROCEDURE — 99232 SBSQ HOSP IP/OBS MODERATE 35: CPT

## 2020-04-02 PROCEDURE — 71045 X-RAY EXAM CHEST 1 VIEW: CPT | Mod: 26

## 2020-04-02 RX ADMIN — Medication 40 MILLIGRAM(S): at 13:43

## 2020-04-02 RX ADMIN — DONEPEZIL HYDROCHLORIDE 5 MILLIGRAM(S): 10 TABLET, FILM COATED ORAL at 21:59

## 2020-04-02 RX ADMIN — ATORVASTATIN CALCIUM 40 MILLIGRAM(S): 80 TABLET, FILM COATED ORAL at 21:59

## 2020-04-02 RX ADMIN — CHLORHEXIDINE GLUCONATE 1 APPLICATION(S): 213 SOLUTION TOPICAL at 05:09

## 2020-04-02 RX ADMIN — CEFTRIAXONE 100 MILLIGRAM(S): 500 INJECTION, POWDER, FOR SOLUTION INTRAMUSCULAR; INTRAVENOUS at 21:59

## 2020-04-02 RX ADMIN — ENOXAPARIN SODIUM 40 MILLIGRAM(S): 100 INJECTION SUBCUTANEOUS at 13:43

## 2020-04-02 RX ADMIN — Medication 81 MILLIGRAM(S): at 13:43

## 2020-04-02 NOTE — DISCHARGE NOTE PROVIDER - NSDCMRMEDTOKEN_GEN_ALL_CORE_FT
aspirin 81 mg oral delayed release tablet: 1 tab(s) orally once a day  atorvastatin 40 mg oral tablet: 1 tab(s) orally once a day (at bedtime)  cefTRIAXone: to complete 7 day course (end on april 8)  donepezil 5 mg oral tablet: 1 tab(s) orally once a day (at bedtime)  hydroCHLOROthiazide 25 mg oral tablet: 1 tab(s) orally once a day  lisinopril 10 mg oral tablet: 1 tab(s) orally once a day  ocular lubricant ophthalmic ointment: 1 application to each affected eye once a day (at bedtime)  ocular lubricant ophthalmic solution: 1 drop(s) to each affected eye 5 times a day, As needed, Dry Eyes  PARoxetine 40 mg oral tablet: 1 tab(s) orally once a day aspirin 81 mg oral delayed release tablet: 1 tab(s) orally once a day  atorvastatin 40 mg oral tablet: 1 tab(s) orally once a day (at bedtime)  cefpodoxime 100 mg oral tablet: 1 tab(s) orally 2 times a day   donepezil 5 mg oral tablet: 1 tab(s) orally once a day (at bedtime)  hydroCHLOROthiazide 25 mg oral tablet: 1 tab(s) orally once a day  lisinopril 10 mg oral tablet: 1 tab(s) orally once a day  ocular lubricant ophthalmic ointment: 1 application to each affected eye once a day (at bedtime)  ocular lubricant ophthalmic solution: 1 drop(s) to each affected eye 5 times a day, As needed, Dry Eyes  PARoxetine 40 mg oral tablet: 1 tab(s) orally once a day aspirin 81 mg oral delayed release tablet: 1 tab(s) orally once a day  atorvastatin 40 mg oral tablet: 1 tab(s) orally once a day (at bedtime)  cefpodoxime 100 mg oral tablet: 1 tab(s) orally 2 times a day   donepezil 5 mg oral tablet: 1 tab(s) orally once a day (at bedtime)  ocular lubricant ophthalmic ointment: 1 application to each affected eye once a day (at bedtime)  ocular lubricant ophthalmic solution: 1 drop(s) to each affected eye 5 times a day, As needed, Dry Eyes  PARoxetine 40 mg oral tablet: 1 tab(s) orally once a day

## 2020-04-02 NOTE — DISCHARGE NOTE PROVIDER - CARE PROVIDER_API CALL
Shanice Veras)  Internal Medicine  [address] [place] [id number]  Phone: [phone]  Fax: [phone]  Follow Up Time: 1 month

## 2020-04-02 NOTE — PROGRESS NOTE ADULT - SUBJECTIVE AND OBJECTIVE BOX
BEAU COBOS 69y Female  MRN#: [id number]   CODE STATUS:__FULL______      SUBJECTIVE  Patient is a 69y old Female who presents with a chief complaint of hypoxia (31 Mar 2020 12:51)  Currently admitted to medicine with the primary diagnosis of Hypoxia    Satting 91% on RA, put back on 2L      OBJECTIVE  PAST MEDICAL & SURGICAL HISTORY  HTN (hypertension)  CVA (cerebral vascular accident)  No significant past surgical history    ALLERGIES:  No Known Allergies    MEDICATIONS:  STANDING MEDICATIONS  aspirin enteric coated 81 milliGRAM(s) Oral daily  atorvastatin 40 milliGRAM(s) Oral at bedtime  cefTRIAXone   IVPB      cefTRIAXone   IVPB 1000 milliGRAM(s) IV Intermittent every 24 hours  chlorhexidine 4% Liquid 1 Application(s) Topical <User Schedule>  donepezil 5 milliGRAM(s) Oral at bedtime  enoxaparin Injectable 40 milliGRAM(s) SubCutaneous daily  PARoxetine 40 milliGRAM(s) Oral daily    PRN MEDICATIONS      VITAL SIGNS: Last 24 Hours  T(C): 36.8 (02 Apr 2020 08:01), Max: 37.3 (02 Apr 2020 05:00)  T(F): 98.3 (02 Apr 2020 08:01), Max: 99.1 (02 Apr 2020 05:00)  HR: 79 (02 Apr 2020 08:01) (76 - 83)  BP: 114/55 (02 Apr 2020 08:01) (91/54 - 118/59)  BP(mean): --  RR: 18 (02 Apr 2020 08:01) (16 - 20)  SpO2: 96% (02 Apr 2020 08:00) (92% - 96%)    LABS:                        11.5   5.49  )-----------( 234      ( 02 Apr 2020 06:59 )             35.0     04-02    137  |  94<L>  |  23<H>  ----------------------------<  101<H>  4.2   |  29  |  1.0    Ca    8.8      02 Apr 2020 06:59    TPro  6.4  /  Alb  3.8  /  TBili  0.5  /  DBili  x   /  AST  21  /  ALT  14  /  AlkPhos  81  03-31              Culture - Blood (collected 31 Mar 2020 12:05)  Source: .Blood None  Preliminary Report (01 Apr 2020 22:01):    No growth to date.          RADIOLOGY:      PHYSICAL EXAM:    GENERAL: NAD, well-developed, AAOx3  HEENT:  Atraumatic, Normocephalic. EOMI, PERRLA, conjunctiva and sclera clear, No JVD  PULMONARY: decreased breath sounds b/l, no wheezing rhonchi or rales  CARDIOVASCULAR: Regular rate and rhythm; No murmurs, rubs, or gallops  GASTROINTESTINAL: Soft, Nontender, Nondistended; Bowel sounds present  MUSCULOSKELETAL:  2+ Peripheral Pulses, No clubbing, cyanosis, or edema  NEUROLOGY: non-focal, R sided hemiparesis, R sided facial droop  SKIN: No rashes or lesions      ADMISSION SUMMARY  Patient is a 69y old Female who presents with a chief complaint of Currently admitted to medicine with the primary diagnosis of Hypoxia      ASSESSMENT & PLAN    69 year old F (from NH) w/ PMH of dementia, prior CVA w/ residual R hemiparesis, HTN, DM II presenting from nursing home w/ chief complaint of decreased responsiveness. Stroke code called but cancelled after neuro evaluation.  No new focal deficits.  CT head negative.  Patient also hypoxemic to 88% in the ED and placed on 2L NC.  COVID-19 PCR sent. Admitted for further workup.     #Decreased responsiveness/AMS secondary to UTI and COVID-19  - COVID-19 PCR +  - UA positive for leuk esterases, bacteria. Urine cx growing Klebsiella  - Rocephin 1g q24 hours  - blood cx no growth  - CXR clear, will repeat tomorrow   - C/w airborne and contact isolation   - CRP 2.4, procal .22  - Satting 91% on RA  - Supplemental O2 to keep SaO2>92%  - PT/rehab eval    #HTN   - C/w lisinopril, HCTZ    #Dementia  - C/w donepezil    #DM   - Monitor fasting sugars  - Start insulin if fs >180    #Prior CVA  - C/w aspirin and statin    Diet: DASH/ CC  DVT ppx: Lovenox SQ  FULL CODE  SNF placement BEAU COBOS 69y Female  MRN#: [id number]   CODE STATUS:__FULL______      SUBJECTIVE  Patient is a 69y old Female who presents with a chief complaint of hypoxia (31 Mar 2020 12:51)  Currently admitted to medicine with the primary diagnosis of Hypoxia    Satting 91% on RA, put back on 2L      OBJECTIVE  PAST MEDICAL & SURGICAL HISTORY  HTN (hypertension)  CVA (cerebral vascular accident)  No significant past surgical history    ALLERGIES:  No Known Allergies    MEDICATIONS:  STANDING MEDICATIONS  aspirin enteric coated 81 milliGRAM(s) Oral daily  atorvastatin 40 milliGRAM(s) Oral at bedtime  cefTRIAXone   IVPB      cefTRIAXone   IVPB 1000 milliGRAM(s) IV Intermittent every 24 hours  chlorhexidine 4% Liquid 1 Application(s) Topical <User Schedule>  donepezil 5 milliGRAM(s) Oral at bedtime  enoxaparin Injectable 40 milliGRAM(s) SubCutaneous daily  PARoxetine 40 milliGRAM(s) Oral daily    PRN MEDICATIONS      VITAL SIGNS: Last 24 Hours  T(C): 36.8 (02 Apr 2020 08:01), Max: 37.3 (02 Apr 2020 05:00)  T(F): 98.3 (02 Apr 2020 08:01), Max: 99.1 (02 Apr 2020 05:00)  HR: 79 (02 Apr 2020 08:01) (76 - 83)  BP: 114/55 (02 Apr 2020 08:01) (91/54 - 118/59)  BP(mean): --  RR: 18 (02 Apr 2020 08:01) (16 - 20)  SpO2: 96% (02 Apr 2020 08:00) (92% - 96%)    LABS:                        11.5   5.49  )-----------( 234      ( 02 Apr 2020 06:59 )             35.0     04-02    137  |  94<L>  |  23<H>  ----------------------------<  101<H>  4.2   |  29  |  1.0    Ca    8.8      02 Apr 2020 06:59    TPro  6.4  /  Alb  3.8  /  TBili  0.5  /  DBili  x   /  AST  21  /  ALT  14  /  AlkPhos  81  03-31              Culture - Blood (collected 31 Mar 2020 12:05)  Source: .Blood None  Preliminary Report (01 Apr 2020 22:01):    No growth to date.          RADIOLOGY:      PHYSICAL EXAM:    GENERAL: NAD, well-developed, AAOx3  HEENT:  Atraumatic, Normocephalic. EOMI, PERRLA, conjunctiva and sclera clear, No JVD  PULMONARY: decreased breath sounds b/l, no wheezing rhonchi or rales  CARDIOVASCULAR: Regular rate and rhythm; No murmurs, rubs, or gallops  GASTROINTESTINAL: Soft, Nontender, Nondistended; Bowel sounds present  MUSCULOSKELETAL:  2+ Peripheral Pulses, No clubbing, cyanosis, or edema  NEUROLOGY: non-focal, R sided hemiparesis, R sided facial droop  SKIN: No rashes or lesions      ADMISSION SUMMARY  Patient is a 69y old Female who presents with a chief complaint of Currently admitted to medicine with the primary diagnosis of Hypoxia      ASSESSMENT & PLAN    69 year old F (from NH) w/ PMH of dementia, prior CVA w/ residual R hemiparesis, HTN, DM II presenting from nursing home w/ chief complaint of decreased responsiveness. Stroke code called but cancelled after neuro evaluation.  No new focal deficits.  CT head negative.  Patient also hypoxemic to 88% in the ED and placed on 2L NC.  COVID-19 PCR sent. Admitted for further workup.     #Decreased responsiveness/AMS secondary to UTI and COVID-19  - COVID-19 PCR +  - UA positive for leuk esterases, bacteria. Urine cx growing Klebsiella  - Rocephin 1g q24 hours  - blood cx no growth  - CXR clear, will repeat tomorrow   - C/w airborne and contact isolation   - CRP 2.4, procal .22  - Satting 91% on RA  - Supplemental O2 to keep SaO2>92%  - PT/rehab eval    #HTN -- now hypotensive  - DC BP meds    #Dementia  - C/w donepezil    #DM   - Monitor fasting sugars  - Start insulin if fs >180    #Prior CVA  - C/w aspirin and statin    Diet: DASH/ CC  DVT ppx: Lovenox SQ  FULL CODE  SNF placement

## 2020-04-02 NOTE — DISCHARGE NOTE PROVIDER - HOSPITAL COURSE
69 year old F (from NH) w/ PMH of dementia, prior CVA w/ residual R hemiparesis, HTN, DM II presenting from nursing home w/ chief complaint of decreased responsiveness. Stroke code called but cancelled after neuro evaluation.  No new focal deficits.  CT head negative.  Patient also hypoxemic to 88% in the ED and placed on 2L NC.  COVID-19 PCR sent. Admitted for further workup. CXR clear.        COVID-19 PCR positive.         #Decreased responsiveness/AMS secondary to UTI and COVID-19    - COVID-19 PCR +    - UA positive for leuk esterases, bacteria. Urine cx growing Klebsiella    - Rocephin 1g q24 hours    - blood cx no growth    - CXR clear, will repeat tomorrow     - C/w airborne and contact isolation     - CRP 2.4, procal .22    - Satting 91% on RA    - Supplemental O2 to keep SaO2>92%    - PT/rehab eval        #HTN     - C/w lisinopril, HCTZ        #Dementia    - C/w donepezil        #DM     - Monitor fasting sugars    - Start insulin if fs >180        #Prior CVA    - C/w aspirin and statin        Diet: DASH/ CC    DVT ppx: Lovenox SQ    FULL CODE    SNF placement 69 year old F (from NH) w/ PMH of dementia, prior CVA w/ residual R hemiparesis, HTN, DM II presenting from nursing home w/ chief complaint of decreased responsiveness. Stroke code called but cancelled after neuro evaluation.  No new focal deficits.  CT head negative.  Patient also hypoxemic to 88% in the ED and placed on 2L NC.  COVID-19 PCR sent. Admitted for further workup. CXR clear.        COVID-19 PCR positive.         #Decreased responsiveness/AMS secondary to UTI and COVID-19    - COVID-19 PCR +    - UA positive for leuk esterases, bacteria. Urine cx growing Klebsiella    - Rocephin 1g q24 hours    - blood cx no growth    - CXR clear, will repeat tomorrow     - C/w airborne and contact isolation     - CRP 2.4, procal .22    - Satting 91% on RA    - Supplemental O2 to keep SaO2>92%    - PT/rehab eval        #HTN     - holding BP meds        #Dementia    - C/w donepezil        #DM     - Monitor fasting sugars    - Start insulin if fs >180        #Prior CVA    - C/w aspirin and statin        Diet: DASH/ CC    DVT ppx: Lovenox SQ    FULL CODE    SNF placement

## 2020-04-02 NOTE — DISCHARGE NOTE PROVIDER - NSDCCPCAREPLAN_GEN_ALL_CORE_FT
PRINCIPAL DISCHARGE DIAGNOSIS  Diagnosis: Coronavirus infection  Assessment and Plan of Treatment: Your test for the coronavirus came back positive.  We treated you with oxygen supplementation and supportive care (tylenol, cough medications). You have clinically improved and you have been breathing fine without any extra oxygen required.  You are now stable for discharge to a nursing facility.  If you have worsening shortness of breath, please return to the hospital.  Also, make      SECONDARY DISCHARGE DIAGNOSES  Diagnosis: Hyponatremia  Assessment and Plan of Treatment:     Diagnosis: Weakness  Assessment and Plan of Treatment: PRINCIPAL DISCHARGE DIAGNOSIS  Diagnosis: Coronavirus infection  Assessment and Plan of Treatment: Your test for the coronavirus came back positive.  We treated you with oxygen supplementation and supportive care (tylenol, cough medications). You have clinically improved and you have been breathing fine without any extra oxygen required.  You are now stable for discharge to a nursing facility.  If you have worsening shortness of breath, please return to the hospital.  Also, make sure to staty in quarantine for 14 days total in order to prevent transmission of the virus.      SECONDARY DISCHARGE DIAGNOSES  Diagnosis: Bacterial UTI  Assessment and Plan of Treatment: You wre also found to have a urinary tract infection while in the hospital.  We are treating you with antibiotics.  Please complete the full 7 day course of antibiotics.  If you have any worsening urinary symptoms or fevers/chills, please notify your doctor or return to the hospital.

## 2020-04-03 ENCOUNTER — TRANSCRIPTION ENCOUNTER (OUTPATIENT)
Age: 70
End: 2020-04-03

## 2020-04-03 LAB
ANION GAP SERPL CALC-SCNC: 15 MMOL/L — HIGH (ref 7–14)
BASOPHILS # BLD AUTO: 0.01 K/UL — SIGNIFICANT CHANGE UP (ref 0–0.2)
BASOPHILS NFR BLD AUTO: 0.2 % — SIGNIFICANT CHANGE UP (ref 0–1)
BUN SERPL-MCNC: 21 MG/DL — HIGH (ref 10–20)
CALCIUM SERPL-MCNC: 8.7 MG/DL — SIGNIFICANT CHANGE UP (ref 8.5–10.1)
CHLORIDE SERPL-SCNC: 93 MMOL/L — LOW (ref 98–110)
CO2 SERPL-SCNC: 28 MMOL/L — SIGNIFICANT CHANGE UP (ref 17–32)
CREAT SERPL-MCNC: 0.8 MG/DL — SIGNIFICANT CHANGE UP (ref 0.7–1.5)
EOSINOPHIL # BLD AUTO: 0.09 K/UL — SIGNIFICANT CHANGE UP (ref 0–0.7)
EOSINOPHIL NFR BLD AUTO: 1.7 % — SIGNIFICANT CHANGE UP (ref 0–8)
GLUCOSE BLDC GLUCOMTR-MCNC: 141 MG/DL — HIGH (ref 70–99)
GLUCOSE BLDC GLUCOMTR-MCNC: 92 MG/DL — SIGNIFICANT CHANGE UP (ref 70–99)
GLUCOSE SERPL-MCNC: 97 MG/DL — SIGNIFICANT CHANGE UP (ref 70–99)
HCT VFR BLD CALC: 34 % — LOW (ref 37–47)
HGB BLD-MCNC: 11.4 G/DL — LOW (ref 12–16)
IMM GRANULOCYTES NFR BLD AUTO: 0.4 % — HIGH (ref 0.1–0.3)
LYMPHOCYTES # BLD AUTO: 1.38 K/UL — SIGNIFICANT CHANGE UP (ref 1.2–3.4)
LYMPHOCYTES # BLD AUTO: 26.8 % — SIGNIFICANT CHANGE UP (ref 20.5–51.1)
MCHC RBC-ENTMCNC: 31.2 PG — HIGH (ref 27–31)
MCHC RBC-ENTMCNC: 33.5 G/DL — SIGNIFICANT CHANGE UP (ref 32–37)
MCV RBC AUTO: 93.2 FL — SIGNIFICANT CHANGE UP (ref 81–99)
MONOCYTES # BLD AUTO: 0.5 K/UL — SIGNIFICANT CHANGE UP (ref 0.1–0.6)
MONOCYTES NFR BLD AUTO: 9.7 % — HIGH (ref 1.7–9.3)
NEUTROPHILS # BLD AUTO: 3.15 K/UL — SIGNIFICANT CHANGE UP (ref 1.4–6.5)
NEUTROPHILS NFR BLD AUTO: 61.2 % — SIGNIFICANT CHANGE UP (ref 42.2–75.2)
NRBC # BLD: 0 /100 WBCS — SIGNIFICANT CHANGE UP (ref 0–0)
PLATELET # BLD AUTO: 234 K/UL — SIGNIFICANT CHANGE UP (ref 130–400)
POTASSIUM SERPL-MCNC: 4.2 MMOL/L — SIGNIFICANT CHANGE UP (ref 3.5–5)
POTASSIUM SERPL-SCNC: 4.2 MMOL/L — SIGNIFICANT CHANGE UP (ref 3.5–5)
RBC # BLD: 3.65 M/UL — LOW (ref 4.2–5.4)
RBC # FLD: 12.7 % — SIGNIFICANT CHANGE UP (ref 11.5–14.5)
SODIUM SERPL-SCNC: 136 MMOL/L — SIGNIFICANT CHANGE UP (ref 135–146)
WBC # BLD: 5.15 K/UL — SIGNIFICANT CHANGE UP (ref 4.8–10.8)
WBC # FLD AUTO: 5.15 K/UL — SIGNIFICANT CHANGE UP (ref 4.8–10.8)

## 2020-04-03 PROCEDURE — 99239 HOSP IP/OBS DSCHRG MGMT >30: CPT

## 2020-04-03 RX ORDER — CEFPODOXIME PROXETIL 100 MG
1 TABLET ORAL
Qty: 14 | Refills: 0
Start: 2020-04-03 | End: 2020-04-09

## 2020-04-03 RX ADMIN — CHLORHEXIDINE GLUCONATE 1 APPLICATION(S): 213 SOLUTION TOPICAL at 05:23

## 2020-04-03 RX ADMIN — ATORVASTATIN CALCIUM 40 MILLIGRAM(S): 80 TABLET, FILM COATED ORAL at 21:08

## 2020-04-03 RX ADMIN — Medication 40 MILLIGRAM(S): at 11:16

## 2020-04-03 RX ADMIN — Medication 81 MILLIGRAM(S): at 11:16

## 2020-04-03 RX ADMIN — DONEPEZIL HYDROCHLORIDE 5 MILLIGRAM(S): 10 TABLET, FILM COATED ORAL at 21:08

## 2020-04-03 RX ADMIN — CEFTRIAXONE 100 MILLIGRAM(S): 500 INJECTION, POWDER, FOR SOLUTION INTRAMUSCULAR; INTRAVENOUS at 16:19

## 2020-04-03 RX ADMIN — ENOXAPARIN SODIUM 40 MILLIGRAM(S): 100 INJECTION SUBCUTANEOUS at 11:16

## 2020-04-03 NOTE — DISCHARGE NOTE NURSING/CASE MANAGEMENT/SOCIAL WORK - PATIENT PORTAL LINK FT
You can access the FollowMyHealth Patient Portal offered by [facility] by registering at the following website: [email or web address]. By joining [employer]’s FollowMyHealth portal, you will also be able to view your health information using other applications (apps) compatible with our system.

## 2020-04-03 NOTE — PROGRESS NOTE ADULT - ATTENDING COMMENTS
Patient seen and examined independently. Agree with resident note.  # patient is slightly confused-- hx of CVA with dementia  # COVID positive now on RA  # HTN-- bp lower hold meds like HCTZ and lisinopril  #uti-- ceftriaxone is started for klebsiella
Patient seen and examined independently. Agree with resident note.  # COVID positive with minimal symptoms-- was off nasal cannula when I saw her.  # UTI on ceftriaxone  # hypotension resolved  # Advanced dementia--  awaiting NH placement
Patient seen and examined independently. Agree with resident note.  Patient is currently stable saturating 99 on 2l  can even be weaned off completely.  # COVID positive-- very stable  # UTI--can dc home on ciproflox  # OLD CVA with lt side hemiparesis.  Dc plan today--spent more than 30mins

## 2020-04-03 NOTE — PROGRESS NOTE ADULT - SUBJECTIVE AND OBJECTIVE BOX
BEAU COBOS 69y Female  MRN#: [id number]   CODE STATUS:________      SUBJECTIVE  Patient is a 69y old Female who presents with a chief complaint of hypoxia (31 Mar 2020 12:51)  Currently admitted to medicine with the primary diagnosis of Coronavirus infection    satting 99% on 2L- will put on room air  stable for d/c    OBJECTIVE  PAST MEDICAL & SURGICAL HISTORY  HTN (hypertension)  CVA (cerebral vascular accident)  No significant past surgical history    ALLERGIES:  No Known Allergies    MEDICATIONS:  STANDING MEDICATIONS  aspirin enteric coated 81 milliGRAM(s) Oral daily  atorvastatin 40 milliGRAM(s) Oral at bedtime  cefTRIAXone   IVPB      cefTRIAXone   IVPB 1000 milliGRAM(s) IV Intermittent every 24 hours  chlorhexidine 4% Liquid 1 Application(s) Topical <User Schedule>  donepezil 5 milliGRAM(s) Oral at bedtime  enoxaparin Injectable 40 milliGRAM(s) SubCutaneous daily  PARoxetine 40 milliGRAM(s) Oral daily    PRN MEDICATIONS      VITAL SIGNS: Last 24 Hours  T(C): 35.3 (03 Apr 2020 08:00), Max: 37.3 (03 Apr 2020 01:00)  T(F): 95.5 (03 Apr 2020 08:00), Max: 99.1 (03 Apr 2020 01:00)  HR: 69 (03 Apr 2020 08:00) (67 - 84)  BP: 135/64 (03 Apr 2020 08:00) (104/56 - 135/64)  BP(mean): --  RR: 20 (03 Apr 2020 08:00) (20 - 25)  SpO2: 99% (03 Apr 2020 08:00) (92% - 100%)    LABS:                        11.4   5.15  )-----------( 234      ( 03 Apr 2020 07:50 )             34.0     04-03    136  |  93<L>  |  21<H>  ----------------------------<  97  4.2   |  28  |  0.8    Ca    8.7      03 Apr 2020 07:50                Culture - Blood (collected 31 Mar 2020 12:05)  Source: .Blood None  Preliminary Report (01 Apr 2020 22:01):    No growth to date.          RADIOLOGY:      PHYSICAL EXAM:    69 year old F (from NH) w/ PMH of dementia, prior CVA w/ residual R hemiparesis, HTN, DM II presenting from nursing home w/ chief complaint of decreased responsiveness. Stroke code called but cancelled after neuro evaluation.  No new focal deficits.  CT head negative.  Patient also hypoxemic to 88% in the ED and placed on 2L NC.  COVID-19 PCR sent. Admitted for further workup.     #Decreased responsiveness/AMS secondary to UTI and COVID-19  - COVID-19 PCR +  - Satting 9% on 2L- will put on room air  - UA positive for leuk esterases, bacteria. Urine cx growing Klebsiella  - Rocephin 1g q24 hours, will d/c on 7 days of vantin PO  - blood cx no growth  - CXR clear, will repeat tomorrow   - C/w airborne and contact isolation   - CRP 2.4, procal .22  - Supplemental O2 to keep SaO2>92%  - PT/rehab eval    #HTN -- BP low  - DC BP meds    #Dementia  - C/w donepezil    #DM   - Monitor fasting sugars  - Start insulin if fs >180    #Prior CVA  - C/w aspirin and statin    Diet: DASH/ CC  DVT ppx: Lovenox SQ  FULL CODE  SNF placement  Medically stable for d/c BEAU COBOS 69y Female  MRN#: [id number]   CODE STATUS:________      SUBJECTIVE  Patient is a 69y old Female who presents with a chief complaint of hypoxia (31 Mar 2020 12:51)  Currently admitted to medicine with the primary diagnosis of Coronavirus infection    satting 99% on 2L- will put on room air  stable for d/c    OBJECTIVE  PAST MEDICAL & SURGICAL HISTORY  HTN (hypertension)  CVA (cerebral vascular accident)  No significant past surgical history    ALLERGIES:  No Known Allergies    MEDICATIONS:  STANDING MEDICATIONS  aspirin enteric coated 81 milliGRAM(s) Oral daily  atorvastatin 40 milliGRAM(s) Oral at bedtime  cefTRIAXone   IVPB      cefTRIAXone   IVPB 1000 milliGRAM(s) IV Intermittent every 24 hours  chlorhexidine 4% Liquid 1 Application(s) Topical <User Schedule>  donepezil 5 milliGRAM(s) Oral at bedtime  enoxaparin Injectable 40 milliGRAM(s) SubCutaneous daily  PARoxetine 40 milliGRAM(s) Oral daily    PRN MEDICATIONS      VITAL SIGNS: Last 24 Hours  T(C): 35.3 (03 Apr 2020 08:00), Max: 37.3 (03 Apr 2020 01:00)  T(F): 95.5 (03 Apr 2020 08:00), Max: 99.1 (03 Apr 2020 01:00)  HR: 69 (03 Apr 2020 08:00) (67 - 84)  BP: 135/64 (03 Apr 2020 08:00) (104/56 - 135/64)  BP(mean): --  RR: 20 (03 Apr 2020 08:00) (20 - 25)  SpO2: 99% (03 Apr 2020 08:00) (92% - 100%)    LABS:                        11.4   5.15  )-----------( 234      ( 03 Apr 2020 07:50 )             34.0     04-03    136  |  93<L>  |  21<H>  ----------------------------<  97  4.2   |  28  |  0.8    Ca    8.7      03 Apr 2020 07:50                Culture - Blood (collected 31 Mar 2020 12:05)  Source: .Blood None  Preliminary Report (01 Apr 2020 22:01):    No growth to date.          RADIOLOGY:      PHYSICAL EXAM:    69 year old F (from NH) w/ PMH of dementia, prior CVA w/ residual R hemiparesis, HTN, DM II presenting from nursing home w/ chief complaint of decreased responsiveness. Stroke code called but cancelled after neuro evaluation.  No new focal deficits.  CT head negative.  Patient also hypoxemic to 88% in the ED and placed on 2L NC.  COVID-19 PCR sent. Admitted for further workup.     #Decreased responsiveness/AMS secondary to UTI and COVID-19  - COVID-19 PCR +  - Satting 99% on 2L- will put on room air  - UA positive for leuk esterases, bacteria. Urine cx growing Klebsiella  - Rocephin 1g q24 hours, will d/c on 7 days of vantin PO  - blood cx no growth  - CXR clear, will repeat tomorrow   - C/w airborne and contact isolation   - CRP 2.4, procal .22  - Supplemental O2 to keep SaO2>92%  - PT/rehab eval    #HTN -- BP low  - DC BP meds    #Dementia  - C/w donepezil    #DM   - Monitor fasting sugars  - Start insulin if fs >180    #Prior CVA  - C/w aspirin and statin    Diet: DASH/ CC  DVT ppx: Lovenox SQ  FULL CODE  SNF placement  Medically stable for d/c

## 2020-04-04 VITALS
RESPIRATION RATE: 20 BRPM | DIASTOLIC BLOOD PRESSURE: 66 MMHG | SYSTOLIC BLOOD PRESSURE: 122 MMHG | HEART RATE: 85 BPM | TEMPERATURE: 98 F | OXYGEN SATURATION: 95 %

## 2020-04-04 LAB
CULTURE RESULTS: SIGNIFICANT CHANGE UP
SPECIMEN SOURCE: SIGNIFICANT CHANGE UP

## 2020-04-04 PROCEDURE — 99239 HOSP IP/OBS DSCHRG MGMT >30: CPT

## 2020-04-04 RX ADMIN — CHLORHEXIDINE GLUCONATE 1 APPLICATION(S): 213 SOLUTION TOPICAL at 04:15

## 2020-04-04 NOTE — PROGRESS NOTE ADULT - ASSESSMENT
Patient is currently stable saturating 99 on RA  can even be weaned off completely.  # COVID positive-- very stable  # UTI--can dc home on vantin.  # OLD CVA with lt side hemiparesis.  Dc home today--spent more than 30mins.

## 2020-04-04 NOTE — PROGRESS NOTE ADULT - SUBJECTIVE AND OBJECTIVE BOX
SUBJECTIVE:    Patient is a 69y old Female who presents with a chief complaint of hypoxia (31 Mar 2020 12:51)    Currently admitted to medicine with the primary diagnosis of Coronavirus infection     Today is hospital day 5d.     PAST MEDICAL & SURGICAL HISTORY  HTN (hypertension)  CVA (cerebral vascular accident)  No significant past surgical history    ALLERGIES:  No Known Allergies    MEDICATIONS:  STANDING MEDICATIONS  aspirin enteric coated 81 milliGRAM(s) Oral daily  atorvastatin 40 milliGRAM(s) Oral at bedtime  cefTRIAXone   IVPB      cefTRIAXone   IVPB 1000 milliGRAM(s) IV Intermittent every 24 hours  chlorhexidine 4% Liquid 1 Application(s) Topical <User Schedule>  donepezil 5 milliGRAM(s) Oral at bedtime  enoxaparin Injectable 40 milliGRAM(s) SubCutaneous daily  PARoxetine 40 milliGRAM(s) Oral daily    PRN MEDICATIONS    VITALS:   T(F): 97.8  HR: 80  BP: 116/64  RR: 20  SpO2: 96%    LABS:                        11.4   5.15  )-----------( 234      ( 03 Apr 2020 07:50 )             34.0     04-03    136  |  93<L>  |  21<H>  ----------------------------<  97  4.2   |  28  |  0.8    Ca    8.7      03 Apr 2020 07:50                    RADIOLOGY:    PHYSICAL EXAM:  GEN: No acute distress  LUNGS: Clear to auscultation bilaterally   HEART: S1/S2 present. RRR.   ABD/ GI: Soft, non-tender, non-distended. Bowel sounds present  EXT: NC/NC/NE/2+PP/JOSUE  NEURO: AAOX3

## 2020-04-16 DIAGNOSIS — I95.9 HYPOTENSION, UNSPECIFIED: ICD-10-CM

## 2020-04-16 DIAGNOSIS — E87.1 HYPO-OSMOLALITY AND HYPONATREMIA: ICD-10-CM

## 2020-04-16 DIAGNOSIS — I63.9 CEREBRAL INFARCTION, UNSPECIFIED: ICD-10-CM

## 2020-04-16 DIAGNOSIS — J12.89 OTHER VIRAL PNEUMONIA: ICD-10-CM

## 2020-04-16 DIAGNOSIS — Z79.82 LONG TERM (CURRENT) USE OF ASPIRIN: ICD-10-CM

## 2020-04-16 DIAGNOSIS — U07.1 COVID-19: ICD-10-CM

## 2020-04-16 DIAGNOSIS — I10 ESSENTIAL (PRIMARY) HYPERTENSION: ICD-10-CM

## 2020-04-16 DIAGNOSIS — G93.41 METABOLIC ENCEPHALOPATHY: ICD-10-CM

## 2020-04-16 DIAGNOSIS — I69.351 HEMIPLEGIA AND HEMIPARESIS FOLLOWING CEREBRAL INFARCTION AFFECTING RIGHT DOMINANT SIDE: ICD-10-CM

## 2020-04-16 DIAGNOSIS — E78.5 HYPERLIPIDEMIA, UNSPECIFIED: ICD-10-CM

## 2020-04-16 DIAGNOSIS — N39.0 URINARY TRACT INFECTION, SITE NOT SPECIFIED: ICD-10-CM

## 2020-04-16 DIAGNOSIS — E11.65 TYPE 2 DIABETES MELLITUS WITH HYPERGLYCEMIA: ICD-10-CM

## 2020-04-16 DIAGNOSIS — F03.90 UNSPECIFIED DEMENTIA WITHOUT BEHAVIORAL DISTURBANCE: ICD-10-CM

## 2020-04-16 DIAGNOSIS — K21.9 GASTRO-ESOPHAGEAL REFLUX DISEASE WITHOUT ESOPHAGITIS: ICD-10-CM

## 2020-04-16 DIAGNOSIS — J96.01 ACUTE RESPIRATORY FAILURE WITH HYPOXIA: ICD-10-CM

## 2020-04-16 DIAGNOSIS — B96.1 KLEBSIELLA PNEUMONIAE [K. PNEUMONIAE] AS THE CAUSE OF DISEASES CLASSIFIED ELSEWHERE: ICD-10-CM

## 2020-04-16 DIAGNOSIS — K58.9 IRRITABLE BOWEL SYNDROME WITHOUT DIARRHEA: ICD-10-CM

## 2020-06-09 NOTE — ED ADULT NURSE NOTE - NSFALLRSKHARMRISK_ED_ALL_ED
-- DO NOT REPLY / DO NOT REPLY ALL --  -- Message is from the [facility]--    COVID-19 Universal Screening: Negative    General Patient Message      Reason for Call: Returning Nurse Abe's call; please give Carmen a call back.    Caller Information       Type Contact Phone    06/09/2020 01:35 PM Phone (Incoming) CARMEN PORTER (Emergency Contact) [phone]          Alternative phone number: none    Turnaround time given to caller:   \"This message will be sent to [state Provider's name]. The clinical team will fulfill your request as soon as they review your message.\"    
See previous message  
no

## 2021-11-21 ENCOUNTER — INPATIENT (INPATIENT)
Facility: HOSPITAL | Age: 71
LOS: 8 days | Discharge: NURSING, REHAB OR LONG-TERM CARE | End: 2021-11-30
Attending: INTERNAL MEDICINE | Admitting: INTERNAL MEDICINE
Payer: MEDICARE

## 2021-11-21 VITALS
SYSTOLIC BLOOD PRESSURE: 143 MMHG | DIASTOLIC BLOOD PRESSURE: 67 MMHG | HEIGHT: 66 IN | TEMPERATURE: 96 F | HEART RATE: 65 BPM | RESPIRATION RATE: 16 BRPM | OXYGEN SATURATION: 97 % | WEIGHT: 169.98 LBS

## 2021-11-21 LAB
ALBUMIN SERPL ELPH-MCNC: 4 G/DL — SIGNIFICANT CHANGE UP (ref 3.5–5.2)
ALP SERPL-CCNC: 91 U/L — SIGNIFICANT CHANGE UP (ref 30–115)
ALT FLD-CCNC: 12 U/L — SIGNIFICANT CHANGE UP (ref 0–41)
ANION GAP SERPL CALC-SCNC: 14 MMOL/L — SIGNIFICANT CHANGE UP (ref 7–14)
APPEARANCE UR: CLEAR — SIGNIFICANT CHANGE UP
AST SERPL-CCNC: 30 U/L — SIGNIFICANT CHANGE UP (ref 0–41)
BACTERIA # UR AUTO: ABNORMAL
BASOPHILS # BLD AUTO: 0.03 K/UL — SIGNIFICANT CHANGE UP (ref 0–0.2)
BASOPHILS NFR BLD AUTO: 0.5 % — SIGNIFICANT CHANGE UP (ref 0–1)
BILIRUB DIRECT SERPL-MCNC: <0.2 MG/DL — SIGNIFICANT CHANGE UP (ref 0–0.2)
BILIRUB INDIRECT FLD-MCNC: >0.5 MG/DL — SIGNIFICANT CHANGE UP (ref 0.2–1.2)
BILIRUB SERPL-MCNC: 0.7 MG/DL — SIGNIFICANT CHANGE UP (ref 0.2–1.2)
BILIRUB UR-MCNC: NEGATIVE — SIGNIFICANT CHANGE UP
BUN SERPL-MCNC: 13 MG/DL — SIGNIFICANT CHANGE UP (ref 10–20)
CALCIUM SERPL-MCNC: 9.5 MG/DL — SIGNIFICANT CHANGE UP (ref 8.5–10.1)
CHLORIDE SERPL-SCNC: 100 MMOL/L — SIGNIFICANT CHANGE UP (ref 98–110)
CO2 SERPL-SCNC: 21 MMOL/L — SIGNIFICANT CHANGE UP (ref 17–32)
COLOR SPEC: YELLOW — SIGNIFICANT CHANGE UP
CREAT SERPL-MCNC: 0.7 MG/DL — SIGNIFICANT CHANGE UP (ref 0.7–1.5)
DIFF PNL FLD: NEGATIVE — SIGNIFICANT CHANGE UP
EOSINOPHIL # BLD AUTO: 0.15 K/UL — SIGNIFICANT CHANGE UP (ref 0–0.7)
EOSINOPHIL NFR BLD AUTO: 2.3 % — SIGNIFICANT CHANGE UP (ref 0–8)
EPI CELLS # UR: 1 /HPF — SIGNIFICANT CHANGE UP (ref 0–5)
GLUCOSE SERPL-MCNC: 98 MG/DL — SIGNIFICANT CHANGE UP (ref 70–99)
GLUCOSE UR QL: NEGATIVE — SIGNIFICANT CHANGE UP
HCT VFR BLD CALC: 39.7 % — SIGNIFICANT CHANGE UP (ref 37–47)
HGB BLD-MCNC: 12.8 G/DL — SIGNIFICANT CHANGE UP (ref 12–16)
HYALINE CASTS # UR AUTO: 7 /LPF — SIGNIFICANT CHANGE UP (ref 0–7)
IMM GRANULOCYTES NFR BLD AUTO: 0.3 % — SIGNIFICANT CHANGE UP (ref 0.1–0.3)
KETONES UR-MCNC: NEGATIVE — SIGNIFICANT CHANGE UP
LACTATE SERPL-SCNC: 1.3 MMOL/L — SIGNIFICANT CHANGE UP (ref 0.7–2)
LEUKOCYTE ESTERASE UR-ACNC: ABNORMAL
LIDOCAIN IGE QN: 94 U/L — HIGH (ref 7–60)
LYMPHOCYTES # BLD AUTO: 2.64 K/UL — SIGNIFICANT CHANGE UP (ref 1.2–3.4)
LYMPHOCYTES # BLD AUTO: 40.9 % — SIGNIFICANT CHANGE UP (ref 20.5–51.1)
MCHC RBC-ENTMCNC: 29.4 PG — SIGNIFICANT CHANGE UP (ref 27–31)
MCHC RBC-ENTMCNC: 32.2 G/DL — SIGNIFICANT CHANGE UP (ref 32–37)
MCV RBC AUTO: 91.1 FL — SIGNIFICANT CHANGE UP (ref 81–99)
MONOCYTES # BLD AUTO: 0.46 K/UL — SIGNIFICANT CHANGE UP (ref 0.1–0.6)
MONOCYTES NFR BLD AUTO: 7.1 % — SIGNIFICANT CHANGE UP (ref 1.7–9.3)
NEUTROPHILS # BLD AUTO: 3.16 K/UL — SIGNIFICANT CHANGE UP (ref 1.4–6.5)
NEUTROPHILS NFR BLD AUTO: 48.9 % — SIGNIFICANT CHANGE UP (ref 42.2–75.2)
NITRITE UR-MCNC: POSITIVE
NRBC # BLD: 0 /100 WBCS — SIGNIFICANT CHANGE UP (ref 0–0)
PH UR: 6 — SIGNIFICANT CHANGE UP (ref 5–8)
PLATELET # BLD AUTO: 290 K/UL — SIGNIFICANT CHANGE UP (ref 130–400)
POTASSIUM SERPL-MCNC: 5.5 MMOL/L — HIGH (ref 3.5–5)
POTASSIUM SERPL-SCNC: 5.5 MMOL/L — HIGH (ref 3.5–5)
PROT SERPL-MCNC: 7 G/DL — SIGNIFICANT CHANGE UP (ref 6–8)
PROT UR-MCNC: SIGNIFICANT CHANGE UP
RBC # BLD: 4.36 M/UL — SIGNIFICANT CHANGE UP (ref 4.2–5.4)
RBC # FLD: 12.7 % — SIGNIFICANT CHANGE UP (ref 11.5–14.5)
RBC CASTS # UR COMP ASSIST: 3 /HPF — SIGNIFICANT CHANGE UP (ref 0–4)
SARS-COV-2 RNA SPEC QL NAA+PROBE: SIGNIFICANT CHANGE UP
SODIUM SERPL-SCNC: 135 MMOL/L — SIGNIFICANT CHANGE UP (ref 135–146)
SP GR SPEC: 1.02 — SIGNIFICANT CHANGE UP (ref 1.01–1.03)
TROPONIN T SERPL-MCNC: <0.01 NG/ML — SIGNIFICANT CHANGE UP
UROBILINOGEN FLD QL: SIGNIFICANT CHANGE UP
WBC # BLD: 6.46 K/UL — SIGNIFICANT CHANGE UP (ref 4.8–10.8)
WBC # FLD AUTO: 6.46 K/UL — SIGNIFICANT CHANGE UP (ref 4.8–10.8)
WBC UR QL: 51 /HPF — HIGH (ref 0–5)

## 2021-11-21 PROCEDURE — 71045 X-RAY EXAM CHEST 1 VIEW: CPT | Mod: 26

## 2021-11-21 PROCEDURE — 74177 CT ABD & PELVIS W/CONTRAST: CPT | Mod: 26,MA

## 2021-11-21 PROCEDURE — 99285 EMERGENCY DEPT VISIT HI MDM: CPT

## 2021-11-21 PROCEDURE — 99053 MED SERV 10PM-8AM 24 HR FAC: CPT

## 2021-11-21 PROCEDURE — 93010 ELECTROCARDIOGRAM REPORT: CPT

## 2021-11-21 RX ORDER — SODIUM CHLORIDE 9 MG/ML
1000 INJECTION INTRAMUSCULAR; INTRAVENOUS; SUBCUTANEOUS ONCE
Refills: 0 | Status: COMPLETED | OUTPATIENT
Start: 2021-11-21 | End: 2021-11-21

## 2021-11-21 RX ORDER — CEFTRIAXONE 500 MG/1
1000 INJECTION, POWDER, FOR SOLUTION INTRAMUSCULAR; INTRAVENOUS ONCE
Refills: 0 | Status: COMPLETED | OUTPATIENT
Start: 2021-11-21 | End: 2021-11-21

## 2021-11-21 RX ADMIN — SODIUM CHLORIDE 1000 MILLILITER(S): 9 INJECTION INTRAMUSCULAR; INTRAVENOUS; SUBCUTANEOUS at 21:29

## 2021-11-21 RX ADMIN — CEFTRIAXONE 100 MILLIGRAM(S): 500 INJECTION, POWDER, FOR SOLUTION INTRAMUSCULAR; INTRAVENOUS at 21:39

## 2021-11-21 NOTE — MEDICAL STUDENT ADULT H&P (EDUCATION) - NS MD HP STUD HX OF PRESENT ILLNESS FT
Patient is a 70 year old female, non-verbal, with past medical history of stroke, HTN, HLD, past psychiatric history of depression on Paxil, BIBEMS from home with complaint of no bowel movement for the past 8 days. Patient is non-verbal due to history of stroke and history was obtained by sister Milagros. Sister was concerned with patient's lack of bowel movement and started her on a high fiber diet and OTC laxatives which did not resolve her lack of bowel movement. Patient denies vomiting, nausea, abdominal pain, diarrhea, and chest pain.

## 2021-11-21 NOTE — ED PROVIDER NOTE - PROGRESS NOTE DETAILS
Spoke with sister, who noted she is not able to take care of patient, requested placement for nursing home.

## 2021-11-21 NOTE — ED PROVIDER NOTE - OBJECTIVE STATEMENT
70F hx of dementia (nonverbal), CVA w/ Right sided residual weakness, HTN, depression presents with constipation. Sister notes that patient has had difficulty having bowel movements, last one was 8 days ago, they have been using otc stool softeners with minimal improvement. Sister denies any vomiting/fever/chills/altered mental status/cough.

## 2021-11-21 NOTE — ED PROVIDER NOTE - PHYSICAL EXAMINATION
Vital Signs: I have reviewed the initial vital signs.  Constitutional: well-nourished, appears stated age, no acute distress.  HEENT: Airway patent, moist MM, no erythema/swelling/deformity of oral structures. EOMI, PERRLA.  CV: regular rate, regular rhythm, well-perfused extremities, 2+ b/l DP and radial pulses equal.  Lungs: BCTA, no increased WOB.  ABD: soft, NTND, no guarding or rebound, no pulsatile mass, no hernias, no flank pain.   MSK: Neck supple, nontender, nl ROM, no stepoff. Chest nontender. Back nontender in TLS spine or to b/l bony structures. Ext nontender, nl rom, no deformity.   INTEG: Skin warm, dry, no rash.  NEURO: nonverbal  PSYCH: Calm, cooperative, normal affect and interaction.

## 2021-11-21 NOTE — ED PROVIDER NOTE - ATTENDING CONTRIBUTION TO CARE
70yF dementia CVA w/ residual R sided weakness HTN depression p/w constipation - last BM 8d ago - pt not in distress - no n/v and still tolerating PO - pt says no to any abd pain, but has limited ability to verbalize or respond to questions.  Sister has been giving her stool softeners w/o improvement.    abd soft, NT, ND, no r/g

## 2021-11-21 NOTE — ED ADULT TRIAGE NOTE - CHIEF COMPLAINT QUOTE
Pt BIBEMS from home c/o constipation x1 week; pt is nonverbal s/t CVA x2; pt lives home w/ family & private aide.

## 2021-11-21 NOTE — ED PROVIDER NOTE - CLINICAL SUMMARY MEDICAL DECISION MAKING FREE TEXT BOX
70yF HTN dementia CVA p/w abd pain and constipation.  Pt nontoxic appearing.  Labs reassuring.  EKG w/o ischemia or arrhythmia.  CXR w/o ptx or pna.  CT A/P w/o acute pathology.  UA c/w UTI, possibly pyelonephritis given report of abd pain.  D/w family, who do not feel comfortable caring for pt at home, so will adm for UTI and constipation (for abx, started in the ED, and bowel regimen) and safe dispo.

## 2021-11-21 NOTE — ED PROVIDER NOTE - CARE PLAN
1 Principal Discharge DX:	Constipation  Secondary Diagnosis:	Social problem   Principal Discharge DX:	Constipation  Secondary Diagnosis:	Social problem  Secondary Diagnosis:	Acute UTI   Principal Discharge DX:	Acute UTI  Secondary Diagnosis:	Social problem  Secondary Diagnosis:	Constipation

## 2021-11-22 LAB
COVID-19 NUCLEOCAPSID GAM AB INTERP: POSITIVE
COVID-19 NUCLEOCAPSID TOTAL GAM ANTIBODY RESULT: 38.9 INDEX — HIGH
COVID-19 SPIKE DOMAIN AB INTERP: POSITIVE
COVID-19 SPIKE DOMAIN ANTIBODY RESULT: >250 U/ML — HIGH
SARS-COV-2 IGG+IGM SERPL QL IA: 38.9 INDEX — HIGH
SARS-COV-2 IGG+IGM SERPL QL IA: >250 U/ML — HIGH
SARS-COV-2 IGG+IGM SERPL QL IA: POSITIVE
SARS-COV-2 IGG+IGM SERPL QL IA: POSITIVE

## 2021-11-22 PROCEDURE — 99232 SBSQ HOSP IP/OBS MODERATE 35: CPT

## 2021-11-22 RX ORDER — ENOXAPARIN SODIUM 100 MG/ML
40 INJECTION SUBCUTANEOUS DAILY
Refills: 0 | Status: DISCONTINUED | OUTPATIENT
Start: 2021-11-22 | End: 2021-11-30

## 2021-11-22 RX ORDER — POLYETHYLENE GLYCOL 3350 17 G/17G
17 POWDER, FOR SOLUTION ORAL
Refills: 0 | Status: DISCONTINUED | OUTPATIENT
Start: 2021-11-22 | End: 2021-11-26

## 2021-11-22 RX ORDER — CEFTRIAXONE 500 MG/1
1000 INJECTION, POWDER, FOR SOLUTION INTRAMUSCULAR; INTRAVENOUS EVERY 24 HOURS
Refills: 0 | Status: COMPLETED | OUTPATIENT
Start: 2021-11-22 | End: 2021-11-24

## 2021-11-22 RX ORDER — SENNA PLUS 8.6 MG/1
2 TABLET ORAL AT BEDTIME
Refills: 0 | Status: DISCONTINUED | OUTPATIENT
Start: 2021-11-22 | End: 2021-11-26

## 2021-11-22 RX ORDER — ATORVASTATIN CALCIUM 80 MG/1
20 TABLET, FILM COATED ORAL AT BEDTIME
Refills: 0 | Status: DISCONTINUED | OUTPATIENT
Start: 2021-11-22 | End: 2021-11-30

## 2021-11-22 RX ORDER — GABAPENTIN 400 MG/1
300 CAPSULE ORAL DAILY
Refills: 0 | Status: DISCONTINUED | OUTPATIENT
Start: 2021-11-22 | End: 2021-11-30

## 2021-11-22 RX ADMIN — GABAPENTIN 300 MILLIGRAM(S): 400 CAPSULE ORAL at 13:49

## 2021-11-22 RX ADMIN — Medication 40 MILLIGRAM(S): at 13:48

## 2021-11-22 RX ADMIN — CEFTRIAXONE 100 MILLIGRAM(S): 500 INJECTION, POWDER, FOR SOLUTION INTRAMUSCULAR; INTRAVENOUS at 06:11

## 2021-11-22 RX ADMIN — ATORVASTATIN CALCIUM 20 MILLIGRAM(S): 80 TABLET, FILM COATED ORAL at 21:41

## 2021-11-22 RX ADMIN — ENOXAPARIN SODIUM 40 MILLIGRAM(S): 100 INJECTION SUBCUTANEOUS at 13:49

## 2021-11-22 RX ADMIN — SENNA PLUS 2 TABLET(S): 8.6 TABLET ORAL at 21:42

## 2021-11-22 NOTE — SWALLOW BEDSIDE ASSESSMENT ADULT - COMMENTS
Mild/mod oral deficits with soft solids 2/2 decreased bolus manipulation/formation, delayed ALYX, + residue in lingual surface, suspected pharyngeal dysphagia with soft solids 2/2 occasional wet vocal quality post trials. Patient's sister reports difficulties chewing soft meals prior to her admission and b/l dysarthric speech from previous CVA.

## 2021-11-22 NOTE — SWALLOW BEDSIDE ASSESSMENT ADULT - SLP PERTINENT HISTORY OF CURRENT PROBLEM
71 y/o female with PMHx of dementia, CVA w/ Right sided residual hemiparesis, HTN, depression. Admin to ED by sister for worsening constipation.  Baseline patient is mostly bedbound, mostly nonverbal. CXR negative.

## 2021-11-22 NOTE — PHYSICAL THERAPY INITIAL EVALUATION ADULT - PERTINENT HX OF CURRENT PROBLEM, REHAB EVAL
71 y/o female with PMHx of dementia ( mostly nonverbal), CVA w/ Right sided residual hemiparesis, HTN, depression brought into the ED by sister for worsening constipation. History is obtain by sister (peter Fields) who reports that patient has not had a BM in 8 days, despite use of OTC stool softners. She also reports that it has been difficult for her to care for her sister at home.

## 2021-11-22 NOTE — CHART NOTE - NSCHARTNOTEFT_GEN_A_CORE
Brief Attending PN:     69 y/o female with PMHx of dementia ( mostly nonverbal), CVA w/ Right sided residual hemiparesis, HTN, depression brought into the ED by sister for worsening constipation, and difficulty managing patient at home. Per documented history no reports of AMS , dysuria, urinary frequency or foul smelling urine.  UA in ED noted to be positive, but clinically low suspicion for UTI. Started on Ceftriaxone, will complete a short 3d course for cystitis given that pt is nonverbal and therefore unable to corroborate symptoms. Pending Urine cultures. With regard to constipation, will initiate enemas, given patients poor PO intake. Speech/ Swallow eval completed and diet recommendations in place. SW consulted for placement into NH. Full PN to follow tomorrow.     Marsha French, DO

## 2021-11-22 NOTE — SWALLOW BEDSIDE ASSESSMENT ADULT - SWALLOW EVAL: DIAGNOSIS
Mild/mod oral deficits with soft solids, suspected pharyngeal dysphagia with soft solids. + toleration for minced & moist, puree and thin liquids without overt s/s of aspiration/ penetration.

## 2021-11-22 NOTE — H&P ADULT - ASSESSMENT
69 y/o female with PMHx of dementia ( mostly nonverbal), CVA w/ Right sided residual hemiparesis, HTN, depression brought into the ED by sister for worsening constipation. History is obtain by sister (peter Fields) who reports that patient has not had a BM in 8 days, despite use of OTC stool softners. She also reports that it has been difficult for her to care for her sister at home.     #Constipation  - no stool burden on CT ab/pelv   - abdomen soft on physical exam   - start senna and miralax     #UTI, uncomplicated cystitis  - UA +nitrates, LE, WBC and bacteria  - s/p Rocephin in ED, continue with Rocephin  - f/u UCx    #Hx of dementia  #Hx of CVA s/p right residual hemiparesis   - unable to be cared for at home   - PT consult  -  for possible placement   - continue with Paxil and atorvastatin     #DVT PPx: Lovenox  #Gi PPx: not indicated  #dysphagia 3, S&S consult

## 2021-11-22 NOTE — H&P ADULT - NSHPLABSRESULTS_GEN_ALL_CORE
12.8   6.46  )-----------( 290      ( 2021 20:50 )             39.7         135  |  100  |  13  ----------------------------<  98  5.5<H>   |  21  |  0.7    Ca    9.5      2021 20:06    TPro  7.0  /  Alb  4.0  /  TBili  0.7  /  DBili  <0.2  /  AST  30  /  ALT  12  /  AlkPhos  91          CARDIAC MARKERS ( 2021 20:06 )  x     / <0.01 ng/mL / x     / x     / x            LIVER FUNCTIONS - ( 2021 20:06 )  Alb: 4.0 g/dL / Pro: 7.0 g/dL / ALK PHOS: 91 U/L / ALT: 12 U/L / AST: 30 U/L / GGT: x             COVID-19 PCR: NotDetec (21 @ 20:06)      Urinalysis Basic - ( 2021 20:50 )    Color: Yellow / Appearance: Clear / S.025 / pH: x  Gluc: x / Ketone: Negative  / Bili: Negative / Urobili: <2 mg/dL   Blood: x / Protein: Trace / Nitrite: Positive   Leuk Esterase: Large / RBC: 3 /HPF / WBC 51 /HPF   Sq Epi: x / Non Sq Epi: 1 /HPF / Bacteria: Many        RADIOLOGY & ADDITIONAL STUDIES:    EXAM:  CT ABDOMEN AND PELVIS IC            PROCEDURE DATE:  2021            INTERPRETATION:  CLINICAL STATEMENT: Abdominal pain.    TECHNIQUE: Contiguous axial CT images were obtained from the lower chest to the pubic symphysis following administration of 100cc Optiray 320 intravenous contrast.  Oral contrast was not administered.  Reformatted images in the coronal and sagittal planes were acquired.    Comparison made with CT abdomen and pelvis 2020.    FINDINGS:    LOWER CHEST: Bibasilar subsegmental atelectasis.    HEPATOBILIARY: Unremarkable.    SPLEEN: Unremarkable.    PANCREAS: Unremarkable.    ADRENAL GLANDS: Unchanged bilateral adrenal gland thickening.    KIDNEYS: Unremarkable.    ABDOMINOPELVIC NODES: Unremarkable.    PELVIC ORGANS: Unremarkable.    PERITONEUM/MESENTERY/BOWEL: Unremarkable.    BONES/SOFT TISSUES: Unremarkable.    OTHER: Vascular calcifications      IMPRESSION:  No evidence of acute intra-abdominal pathology.

## 2021-11-22 NOTE — SWALLOW BEDSIDE ASSESSMENT ADULT - CONSISTENCIES ADMINISTERED
3oz water, 2oz puree, 2oz minced & moist./thin liquid/pureed/minced & moist 3oz pancakes with syrup/soft & bite-sized

## 2021-11-22 NOTE — PATIENT PROFILE ADULT - STATED REASON FOR ADMISSION
pt brought in by sister, who takes care of her at home; per sister in ED, pt has not had BM for 8 days

## 2021-11-22 NOTE — H&P ADULT - HISTORY OF PRESENT ILLNESS
70F hx of dementia (nonverbal), CVA w/ Right sided residual weakness, HTN, depression presents with constipation. Sister notes that patient has had difficulty having bowel movements, last one was 8 days ago, they have been using otc stool softeners with minimal improvement. Sister denies any vomiting/fever/chills/altered mental status/cough 71 y/o female with PMHx of dementia ( mostly nonverbal), CVA w/ Right sided residual hemiparesis, HTN, depression brought into the ED by sister for worsening constipation. History is obtain by sister (peter Fields) who reports that patient has not had a BM in 8 days, despite use of OTC stool softners. She also reports that it has been difficult for her to care for her sister at home. She denies any recent fevers, chills, SOB, abdominal pain, vomiting. At baseline patient is mostly bedbound, mostly nonverbal.     In the ED, VS noted for T 96, HR 65, /67. Labs notable for Lipase 94. UA +nitrates, LE, bacteria, WBC. CT ab/pelv negative for acute pathology. s/p Rocephin in ED.

## 2021-11-22 NOTE — SWALLOW BEDSIDE ASSESSMENT ADULT - NS SPL SWALLOW CLINIC TRIAL FT
+ toleration for minced & moist, puree and thin liquids without overt s/s of aspiration/ penetration

## 2021-11-22 NOTE — SWALLOW BEDSIDE ASSESSMENT ADULT - SWALLOW EVAL: RECOMMENDED FEEDING/EATING TECHNIQUES
allow for swallow between intakes/check mouth frequently for oral residue/pocketing/small sips/bites

## 2021-11-22 NOTE — H&P ADULT - NSHPPHYSICALEXAM_GEN_ALL_CORE
VITALS:   T(C): 35.6 (11-21-21 @ 18:47), Max: 35.6 (11-21-21 @ 18:47)  HR: 65 (11-21-21 @ 18:47) (65 - 65)  BP: 143/67 (11-21-21 @ 18:47) (143/67 - 143/67)  RR: 16 (11-21-21 @ 18:47) (16 - 16)  SpO2: 97% (11-21-21 @ 18:47) (97% - 97%)    GENERAL: NAD, lying in bed comfortably  HEAD:  Atraumatic, Normocephalic  EYES: EOMI, PERRLA, conjunctiva and sclera clear  ENT: Moist mucous membranes  NECK: Supple, No JVD  CHEST/LUNG: Clear to auscultation bilaterally; No rales, rhonchi, wheezing, or rubs. Unlabored respirations  HEART: Regular rate and rhythm; No murmurs, rubs, or gallops  ABDOMEN: BSx4; Soft, nontender, nondistended  EXTREMITIES:  2+ Peripheral Pulses, brisk capillary refill. No clubbing, cyanosis, or edema  NERVOUS SYSTEM:  A&Ox3, no focal deficits   SKIN: No rashes or lesions VITALS:   T(C): 35.6 (11-21-21 @ 18:47), Max: 35.6 (11-21-21 @ 18:47)  HR: 65 (11-21-21 @ 18:47) (65 - 65)  BP: 143/67 (11-21-21 @ 18:47) (143/67 - 143/67)  RR: 16 (11-21-21 @ 18:47) (16 - 16)  SpO2: 97% (11-21-21 @ 18:47) (97% - 97%)    GENERAL: NAD, lying in bed comfortably  HEAD:  Atraumatic, Normocephalic  NECK: Supple, No JVD  CHEST/LUNG: Clear to auscultation bilaterally; No rales, rhonchi, wheezing, or rubs. Unlabored respirations  HEART: Regular rate and rhythm; No murmurs, rubs, or gallops  ABDOMEN: BSx4; Soft, nontender, nondistended  EXTREMITIES:  2+ Peripheral Pulses, brisk capillary refill. No clubbing, cyanosis, or edema  NERVOUS SYSTEM:  alert, awakes to name, unable to follow commands

## 2021-11-22 NOTE — H&P ADULT - ATTENDING COMMENTS
HPI:  69 y/o female with PMHx of dementia ( mostly nonverbal), CVA w/ Right sided residual hemiparesis, HTN, depression brought into the ED by sister for worsening constipation. History is obtain by sister (peter Fields) who reports that patient has not had a BM in 8 days, despite use of OTC stool softners. She also reports that it has been difficult for her to care for her sister at home. She denies any recent fevers, chills, SOB, abdominal pain, vomiting. At baseline patient is mostly bedbound, mostly nonverbal.     In the ED, VS noted for T 96, HR 65, /67. Labs notable for Lipase 94. UA +nitrates, LE, bacteria, WBC. CT ab/pelv negative for acute pathology. s/p Rocephin in ED.      (22 Nov 2021 00:56)    REVIEW OF SYSTEMS: see cc/HPI   CONSTITUTIONAL: No weakness, fevers or chills  EYES/ENT: No visual changes;  No vertigo or throat pain   NECK: No pain or stiffness  RESPIRATORY: No cough, wheezing, hemoptysis; No shortness of breath  CARDIOVASCULAR: No chest pain or palpitations  GASTROINTESTINAL: No abdominal or epigastric pain. No nausea, vomiting, or hematemesis; No diarrhea or constipation. No melena or hematochezia.  GENITOURINARY: No dysuria, frequency or hematuria  NEUROLOGICAL: No numbness or weakness  SKIN: No itching, rashes    Physical Exam:  General: WN/WD NAD  Neurology: A&Ox3, nonfocal, follows commands  Eyes: PERRLA/ EOMI  ENT/Neck: Neck supple, trachea midline, No JVD  Respiratory: CTA B/L, No wheezing, rales, rhonchi  CV: Normal rate regular rhythm, S1S2, no murmurs, rubs or gallops  Abdominal: Soft, NT, ND +BS,   Extremities: No edema, + peripheral pulses  Skin: No Rashes, Hematoma, Ecchymosis  Incisions: n/a  Tubes: n/a    A/p    UTI - cystitis   -IV abx   - check blood and Ucx    Constipation   - agree w/ Miralax and PRN senna tabs  -high fiber diet     Dementia   CVA w/ residual R zachariah  -pressure ulcer prevention / rotate patient  -PT/Rehab eval   - for possible placement     DVT prophylaxis     Dispo - possible NH placement

## 2021-11-22 NOTE — PHYSICAL THERAPY INITIAL EVALUATION ADULT - GENERAL OBSERVATIONS, REHAB EVAL
Pt encountered supine in bed, confused, A&O x 1(name), +IV, no c/o pain. Pt performed bed mobility with Max A and poor sitting balance at EOB. Pt unable to performed sit/stand transfer and ambulation secondary Rt sided weakness and poor safety awareness. Pt is dependent and likely needs mechanical lift for transfer. Pt left in bed as found.

## 2021-11-23 LAB
ALBUMIN SERPL ELPH-MCNC: 3.8 G/DL — SIGNIFICANT CHANGE UP (ref 3.5–5.2)
ALP SERPL-CCNC: 88 U/L — SIGNIFICANT CHANGE UP (ref 30–115)
ALT FLD-CCNC: 10 U/L — SIGNIFICANT CHANGE UP (ref 0–41)
ANION GAP SERPL CALC-SCNC: 15 MMOL/L — HIGH (ref 7–14)
AST SERPL-CCNC: 16 U/L — SIGNIFICANT CHANGE UP (ref 0–41)
BASOPHILS # BLD AUTO: 0.02 K/UL — SIGNIFICANT CHANGE UP (ref 0–0.2)
BASOPHILS NFR BLD AUTO: 0.4 % — SIGNIFICANT CHANGE UP (ref 0–1)
BILIRUB SERPL-MCNC: 0.7 MG/DL — SIGNIFICANT CHANGE UP (ref 0.2–1.2)
BUN SERPL-MCNC: 9 MG/DL — LOW (ref 10–20)
CALCIUM SERPL-MCNC: 9.3 MG/DL — SIGNIFICANT CHANGE UP (ref 8.5–10.1)
CHLORIDE SERPL-SCNC: 100 MMOL/L — SIGNIFICANT CHANGE UP (ref 98–110)
CO2 SERPL-SCNC: 24 MMOL/L — SIGNIFICANT CHANGE UP (ref 17–32)
CREAT SERPL-MCNC: 0.6 MG/DL — LOW (ref 0.7–1.5)
EOSINOPHIL # BLD AUTO: 0.14 K/UL — SIGNIFICANT CHANGE UP (ref 0–0.7)
EOSINOPHIL NFR BLD AUTO: 2.6 % — SIGNIFICANT CHANGE UP (ref 0–8)
GLUCOSE SERPL-MCNC: 89 MG/DL — SIGNIFICANT CHANGE UP (ref 70–99)
HCT VFR BLD CALC: 40.6 % — SIGNIFICANT CHANGE UP (ref 37–47)
HGB BLD-MCNC: 13 G/DL — SIGNIFICANT CHANGE UP (ref 12–16)
IMM GRANULOCYTES NFR BLD AUTO: 0.2 % — SIGNIFICANT CHANGE UP (ref 0.1–0.3)
LYMPHOCYTES # BLD AUTO: 1.8 K/UL — SIGNIFICANT CHANGE UP (ref 1.2–3.4)
LYMPHOCYTES # BLD AUTO: 33.9 % — SIGNIFICANT CHANGE UP (ref 20.5–51.1)
MCHC RBC-ENTMCNC: 29.8 PG — SIGNIFICANT CHANGE UP (ref 27–31)
MCHC RBC-ENTMCNC: 32 G/DL — SIGNIFICANT CHANGE UP (ref 32–37)
MCV RBC AUTO: 93.1 FL — SIGNIFICANT CHANGE UP (ref 81–99)
MONOCYTES # BLD AUTO: 0.35 K/UL — SIGNIFICANT CHANGE UP (ref 0.1–0.6)
MONOCYTES NFR BLD AUTO: 6.6 % — SIGNIFICANT CHANGE UP (ref 1.7–9.3)
NEUTROPHILS # BLD AUTO: 2.99 K/UL — SIGNIFICANT CHANGE UP (ref 1.4–6.5)
NEUTROPHILS NFR BLD AUTO: 56.3 % — SIGNIFICANT CHANGE UP (ref 42.2–75.2)
NRBC # BLD: 0 /100 WBCS — SIGNIFICANT CHANGE UP (ref 0–0)
PLATELET # BLD AUTO: 269 K/UL — SIGNIFICANT CHANGE UP (ref 130–400)
POTASSIUM SERPL-MCNC: 4.2 MMOL/L — SIGNIFICANT CHANGE UP (ref 3.5–5)
POTASSIUM SERPL-SCNC: 4.2 MMOL/L — SIGNIFICANT CHANGE UP (ref 3.5–5)
PROT SERPL-MCNC: 6.4 G/DL — SIGNIFICANT CHANGE UP (ref 6–8)
RBC # BLD: 4.36 M/UL — SIGNIFICANT CHANGE UP (ref 4.2–5.4)
RBC # FLD: 12.5 % — SIGNIFICANT CHANGE UP (ref 11.5–14.5)
SODIUM SERPL-SCNC: 139 MMOL/L — SIGNIFICANT CHANGE UP (ref 135–146)
WBC # BLD: 5.31 K/UL — SIGNIFICANT CHANGE UP (ref 4.8–10.8)
WBC # FLD AUTO: 5.31 K/UL — SIGNIFICANT CHANGE UP (ref 4.8–10.8)

## 2021-11-23 PROCEDURE — 99232 SBSQ HOSP IP/OBS MODERATE 35: CPT

## 2021-11-23 RX ADMIN — ATORVASTATIN CALCIUM 20 MILLIGRAM(S): 80 TABLET, FILM COATED ORAL at 22:36

## 2021-11-23 RX ADMIN — ENOXAPARIN SODIUM 40 MILLIGRAM(S): 100 INJECTION SUBCUTANEOUS at 13:05

## 2021-11-23 RX ADMIN — Medication 40 MILLIGRAM(S): at 13:06

## 2021-11-23 RX ADMIN — POLYETHYLENE GLYCOL 3350 17 GRAM(S): 17 POWDER, FOR SOLUTION ORAL at 05:13

## 2021-11-23 RX ADMIN — CEFTRIAXONE 100 MILLIGRAM(S): 500 INJECTION, POWDER, FOR SOLUTION INTRAMUSCULAR; INTRAVENOUS at 05:13

## 2021-11-23 RX ADMIN — GABAPENTIN 300 MILLIGRAM(S): 400 CAPSULE ORAL at 13:05

## 2021-11-23 NOTE — PROGRESS NOTE ADULT - SUBJECTIVE AND OBJECTIVE BOX
24H events:    Patient is a 70y old Female who presents with a chief complaint of Constipation (2021 00:56)    Primary diagnosis of Acute UTI       Today is hospital day 2d. This morning patient was seen and examined at bedside, resting comfortably in bed.    No acute or major events overnight.    PAST MEDICAL & SURGICAL HISTORY  CVA (cerebral vascular accident)    HTN (hypertension)    No significant past surgical history      SOCIAL HISTORY:  Social History:  Lives with sister (2021 00:56)      ALLERGIES:  No Known Allergies    MEDICATIONS:  STANDING MEDICATIONS  atorvastatin 20 milliGRAM(s) Oral at bedtime  cefTRIAXone   IVPB 1000 milliGRAM(s) IV Intermittent every 24 hours  enoxaparin Injectable 40 milliGRAM(s) SubCutaneous daily  gabapentin 300 milliGRAM(s) Oral daily  PARoxetine 40 milliGRAM(s) Oral daily  polyethylene glycol 3350 17 Gram(s) Oral two times a day  senna 2 Tablet(s) Oral at bedtime    PRN MEDICATIONS    VITALS:   T(F): 96.7  HR: 63  BP: 131/63  RR: 18  SpO2: --    PHYSICAL EXAM:  GENERAL: NAD, well-groomed, well-developed  HEAD:  Atraumatic, Normocephalic  EYES: EOMI  NECK: Supple  NERVOUS SYSTEM:  Alert & Oriented X3, non focal   CHEST/LUNG: Clear to auscultation bilaterally; No rales, rhonchi, wheezing, or rubs  HEART: Regular rate and rhythm; No murmurs, rubs, or gallops  ABDOMEN: Soft, Nontender, Nondistended; Bowel sounds present  EXTREMITIES:  2+ Peripheral Pulses, No clubbing, cyanosis, or edema  LYMPH: No lymphadenopathy noted  SKIN: No rashes or lesions  LABS:                        13.0   5.31  )-----------( 269      ( 2021 07:36 )             40.6     11-23    139  |  100  |  9<L>  ----------------------------<  89  4.2   |  24  |  0.6<L>    Ca    9.3      2021 07:36    TPro  6.4  /  Alb  3.8  /  TBili  0.7  /  DBili  x   /  AST  16  /  ALT  10  /  AlkPhos  88  11-23      Urinalysis Basic - ( 2021 20:50 )    Color: Yellow / Appearance: Clear / S.025 / pH: x  Gluc: x / Ketone: Negative  / Bili: Negative / Urobili: <2 mg/dL   Blood: x / Protein: Trace / Nitrite: Positive   Leuk Esterase: Large / RBC: 3 /HPF / WBC 51 /HPF   Sq Epi: x / Non Sq Epi: 1 /HPF / Bacteria: Many            CARDIAC MARKERS ( 2021 20:06 )  x     / <0.01 ng/mL / x     / x     / x          RADIOLOGY:           24H events:    Patient is a 70y old Female who presents with a chief complaint of Constipation (2021 00:56)    Primary diagnosis of Acute UTI       Today is hospital day 2d. This morning patient was seen and examined at bedside, resting comfortably in bed.    No acute or major events overnight.    PAST MEDICAL & SURGICAL HISTORY  CVA (cerebral vascular accident)    HTN (hypertension)    No significant past surgical history      SOCIAL HISTORY:  Social History:  Lives with sister (2021 00:56)      ALLERGIES:  No Known Allergies    MEDICATIONS:  STANDING MEDICATIONS  atorvastatin 20 milliGRAM(s) Oral at bedtime  cefTRIAXone   IVPB 1000 milliGRAM(s) IV Intermittent every 24 hours  enoxaparin Injectable 40 milliGRAM(s) SubCutaneous daily  gabapentin 300 milliGRAM(s) Oral daily  PARoxetine 40 milliGRAM(s) Oral daily  polyethylene glycol 3350 17 Gram(s) Oral two times a day  senna 2 Tablet(s) Oral at bedtime    PRN MEDICATIONS    VITALS:   T(F): 96.7  HR: 63  BP: 131/63  RR: 18  SpO2: --    PHYSICAL EXAM:  GENERAL: NAD, AAOx0   HEAD:  Atraumatic, Normocephalic  EYES: EOMI  NECK: Supple  CHEST/LUNG: Clear to auscultation bilaterally; No rales, rhonchi, wheezing, or rubs  HEART: Regular rate and rhythm; No murmurs, rubs, or gallops  ABDOMEN: Soft, Nontender, Nondistended; Bowel sounds present  EXTREMITIES:  2+ Peripheral Pulses, No clubbing, cyanosis, or edema  LYMPH: No lymphadenopathy noted  SKIN: No rashes or lesions  LABS:                        13.0   5.31  )-----------( 269      ( 2021 07:36 )             40.6     11-23    139  |  100  |  9<L>  ----------------------------<  89  4.2   |  24  |  0.6<L>    Ca    9.3      2021 07:36    TPro  6.4  /  Alb  3.8  /  TBili  0.7  /  DBili  x   /  AST  16  /  ALT  10  /  AlkPhos  88  11-23      Urinalysis Basic - ( 2021 20:50 )    Color: Yellow / Appearance: Clear / S.025 / pH: x  Gluc: x / Ketone: Negative  / Bili: Negative / Urobili: <2 mg/dL   Blood: x / Protein: Trace / Nitrite: Positive   Leuk Esterase: Large / RBC: 3 /HPF / WBC 51 /HPF   Sq Epi: x / Non Sq Epi: 1 /HPF / Bacteria: Many            CARDIAC MARKERS ( 2021 20:06 )  x     / <0.01 ng/mL / x     / x     / x          RADIOLOGY:

## 2021-11-24 PROCEDURE — 99232 SBSQ HOSP IP/OBS MODERATE 35: CPT

## 2021-11-24 RX ADMIN — POLYETHYLENE GLYCOL 3350 17 GRAM(S): 17 POWDER, FOR SOLUTION ORAL at 16:47

## 2021-11-24 RX ADMIN — SENNA PLUS 2 TABLET(S): 8.6 TABLET ORAL at 22:01

## 2021-11-24 RX ADMIN — Medication 40 MILLIGRAM(S): at 11:20

## 2021-11-24 RX ADMIN — ENOXAPARIN SODIUM 40 MILLIGRAM(S): 100 INJECTION SUBCUTANEOUS at 11:24

## 2021-11-24 RX ADMIN — ATORVASTATIN CALCIUM 20 MILLIGRAM(S): 80 TABLET, FILM COATED ORAL at 22:01

## 2021-11-24 RX ADMIN — CEFTRIAXONE 100 MILLIGRAM(S): 500 INJECTION, POWDER, FOR SOLUTION INTRAMUSCULAR; INTRAVENOUS at 06:12

## 2021-11-24 RX ADMIN — GABAPENTIN 300 MILLIGRAM(S): 400 CAPSULE ORAL at 11:20

## 2021-11-24 NOTE — PROGRESS NOTE ADULT - SUBJECTIVE AND OBJECTIVE BOX
BEAU COBOS MRN#: [id number]  CODE STATUS: FULL CODE     SUBJECTIVE   Chief complaint: Constipation    Currently admitted to medicine with the primary diagnosis of CONSTIPATION;SOCIAL PROBLEM      Today is hospital day 3d,   INTERVAL HPI/OVERNIGHT EVENTS: No acute events overnight    This morning, she is laying in bed comfortably. Nonverbal.    Urinating and stooling appropriately.    Present Today:           Pineda Catheter (x)No/ ()Yes?   Indication:             Central Line (x)No/ ()Yes?  Indication:          IV Fluids (x)No/ ()Yes?  Indication:     OBJECTIVE  PAST MEDICAL & SURGICAL HISTORY  CVA (cerebral vascular accident)    HTN (hypertension)    No significant past surgical history      ALLERGIES:  No Known Allergies    HOME MEDICATIONS:  Home Medications:  atorvastatin 20 mg oral tablet: 1 tab(s) orally once a day (22 Nov 2021 01:57)  Benadryl 50 mg oral capsule: 1 cap(s) orally once (at bedtime) (22 Nov 2021 01:57)  gabapentin 300 mg oral capsule: 1 cap(s) orally once a day (22 Nov 2021 01:57)  PARoxetine 40 mg oral tablet: 1 tab(s) orally once a day (22 Nov 2021 01:57)    MEDICATIONS:  STANDING MEDICATIONS  MEDICATIONS  (STANDING):  atorvastatin 20 milliGRAM(s) Oral at bedtime  enoxaparin Injectable 40 milliGRAM(s) SubCutaneous daily  gabapentin 300 milliGRAM(s) Oral daily  PARoxetine 40 milliGRAM(s) Oral daily  polyethylene glycol 3350 17 Gram(s) Oral two times a day  senna 2 Tablet(s) Oral at bedtime    PRN MEDICATIONS  MEDICATIONS  (PRN):      VITAL SIGNS  T(F): 98 (11-24 @ 12:31), Max: 98.6 (11-23 @ 14:23)  HR: 60 (11-24 @ 12:31) (58 - 103)  BP: 140/80 (11-24 @ 12:31) (133/62 - 154/72)  RR: 20 (11-24 @ 12:31) (18 - 20)  SpO2: --    LABS:                        13.0   5.31  )-----------( 269      ( 23 Nov 2021 07:36 )             40.6     11-23    139  |  100  |  9<L>  ----------------------------<  89  4.2   |  24  |  0.6<L>    Ca    9.3      23 Nov 2021 07:36    TPro  6.4  /  Alb  3.8  /  TBili  0.7  /  DBili  x   /  AST  16  /  ALT  10  /  AlkPhos  88  11-23      Culture - Urine (collected 21 Nov 2021 20:50)  Source: Clean Catch Clean Catch (Midstream)  Preliminary Report:    >100,000 CFU/ml Escherichia coli      RADIOLOGY:   Reviewed    PHYSICAL EXAM:  General: Comfortably laying on the hospital bed. NAD. Pt nonverbal.   HEENT: Atraumatic. Normocephalic. Conjunctiva and sclera clear.  Cardiac: Normal S1, S2. RRR. No murmurs, rubs, or gallops.  Pulm: CTA b/l no wheezes, rhonchi, or rales.  GI: Soft, nontender, nondistended. BSx4q  Ext: 2+ pulses. No edema, cyanosis.  Neuro: unable to assess  Skin: No lesions or rashes

## 2021-11-24 NOTE — PROGRESS NOTE ADULT - ATTENDING COMMENTS
71 y/o female with PMHx of dementia ( mostly nonverbal), CVA w/ Right sided residual hemiparesis, HTN, depression brought into the ED by sister for worsening constipation, and difficulty managing patient at home. Per documented history no reports of AMS , dysuria, urinary frequency or foul smelling urine.  UA in ED noted to be positive, but clinically low suspicion for UTI. Urine Cx w/ GNR, started on Ceftriaxone, will complete a short 3d course for cystitis given that pt is nonverbal and therefore unable to corroborate symptoms. With regard to constipation, patient is s/p enema w/ good BM. Speech/ Swallow eval completed and diet recommendations in place. SW consulted for placement into NH.     #Progress Note Handoff:  Pending (specify): pending STR placement   Disposition: Home___/SNF___/Other________/Unknown at this time_____x___    Marsha French DO.
***My note supersedes any discrepancies that may be above in the resident's note***    71 y/o female with PMHx of dementia ( mostly nonverbal), CVA w/ Right sided residual hemiparesis, HTN, depression brought into the ED by sister for worsening constipation.     # Constipation, resolved  - per sister, no BMs x8d prior to admission  - no stool burden on CT A/P  - multiple large BMs during admission s/p miralax, senna    # Uncomplicated cystitis  - UA w/ nitrates, LE, WBC, bacteria  - UCx E.coli  - pt nonverbal so unable to determine if symptomatic  - s/p Rocephin x4d    # Dementia  # H/o CVA w/ residual R hemiparesis  # Depression  - per sister, unable to care for pt at home  - likely needs placement at SNF  - cont paroxetine, gabapentin, atorvastatin    # HTN  - BP: 140/80 (133/62 - 154/72)  - on no medications at home  - monitor for now    PT/REHAB: PT  ACTIVITY: Activity - Increase As Tolerated  DVT PPX: enoxaparin Injectable  GI PPX:  Not indicated  DIET: minced and moist DASH  CODE: Full code   Disposition: from home; to SNF  Pending: Per [facility], an out of network auth will be needed with [employer]. CMA tasked for auth.

## 2021-11-25 LAB
-  AMIKACIN: SIGNIFICANT CHANGE UP
-  AMOXICILLIN/CLAVULANIC ACID: SIGNIFICANT CHANGE UP
-  AMPICILLIN/SULBACTAM: SIGNIFICANT CHANGE UP
-  AMPICILLIN: SIGNIFICANT CHANGE UP
-  AZTREONAM: SIGNIFICANT CHANGE UP
-  CEFAZOLIN: SIGNIFICANT CHANGE UP
-  CEFEPIME: SIGNIFICANT CHANGE UP
-  CEFOXITIN: SIGNIFICANT CHANGE UP
-  CEFTRIAXONE: SIGNIFICANT CHANGE UP
-  CIPROFLOXACIN: SIGNIFICANT CHANGE UP
-  ERTAPENEM: SIGNIFICANT CHANGE UP
-  GENTAMICIN: SIGNIFICANT CHANGE UP
-  IMIPENEM: SIGNIFICANT CHANGE UP
-  LEVOFLOXACIN: SIGNIFICANT CHANGE UP
-  MEROPENEM: SIGNIFICANT CHANGE UP
-  NITROFURANTOIN: SIGNIFICANT CHANGE UP
-  PIPERACILLIN/TAZOBACTAM: SIGNIFICANT CHANGE UP
-  TIGECYCLINE: SIGNIFICANT CHANGE UP
-  TOBRAMYCIN: SIGNIFICANT CHANGE UP
-  TRIMETHOPRIM/SULFAMETHOXAZOLE: SIGNIFICANT CHANGE UP
ALBUMIN SERPL ELPH-MCNC: 4 G/DL — SIGNIFICANT CHANGE UP (ref 3.5–5.2)
ALP SERPL-CCNC: 87 U/L — SIGNIFICANT CHANGE UP (ref 30–115)
ALT FLD-CCNC: 14 U/L — SIGNIFICANT CHANGE UP (ref 0–41)
ANION GAP SERPL CALC-SCNC: 14 MMOL/L — SIGNIFICANT CHANGE UP (ref 7–14)
AST SERPL-CCNC: 19 U/L — SIGNIFICANT CHANGE UP (ref 0–41)
BASOPHILS # BLD AUTO: 0.02 K/UL — SIGNIFICANT CHANGE UP (ref 0–0.2)
BASOPHILS NFR BLD AUTO: 0.4 % — SIGNIFICANT CHANGE UP (ref 0–1)
BILIRUB SERPL-MCNC: 0.6 MG/DL — SIGNIFICANT CHANGE UP (ref 0.2–1.2)
BUN SERPL-MCNC: 10 MG/DL — SIGNIFICANT CHANGE UP (ref 10–20)
CALCIUM SERPL-MCNC: 9.4 MG/DL — SIGNIFICANT CHANGE UP (ref 8.5–10.1)
CHLORIDE SERPL-SCNC: 103 MMOL/L — SIGNIFICANT CHANGE UP (ref 98–110)
CO2 SERPL-SCNC: 24 MMOL/L — SIGNIFICANT CHANGE UP (ref 17–32)
CREAT SERPL-MCNC: 0.7 MG/DL — SIGNIFICANT CHANGE UP (ref 0.7–1.5)
CULTURE RESULTS: SIGNIFICANT CHANGE UP
EOSINOPHIL # BLD AUTO: 0.17 K/UL — SIGNIFICANT CHANGE UP (ref 0–0.7)
EOSINOPHIL NFR BLD AUTO: 3.3 % — SIGNIFICANT CHANGE UP (ref 0–8)
GLUCOSE SERPL-MCNC: 97 MG/DL — SIGNIFICANT CHANGE UP (ref 70–99)
HCT VFR BLD CALC: 42 % — SIGNIFICANT CHANGE UP (ref 37–47)
HGB BLD-MCNC: 13.4 G/DL — SIGNIFICANT CHANGE UP (ref 12–16)
IMM GRANULOCYTES NFR BLD AUTO: 0.2 % — SIGNIFICANT CHANGE UP (ref 0.1–0.3)
LYMPHOCYTES # BLD AUTO: 1.84 K/UL — SIGNIFICANT CHANGE UP (ref 1.2–3.4)
LYMPHOCYTES # BLD AUTO: 35.4 % — SIGNIFICANT CHANGE UP (ref 20.5–51.1)
MAGNESIUM SERPL-MCNC: 1.8 MG/DL — SIGNIFICANT CHANGE UP (ref 1.8–2.4)
MCHC RBC-ENTMCNC: 29.7 PG — SIGNIFICANT CHANGE UP (ref 27–31)
MCHC RBC-ENTMCNC: 31.9 G/DL — LOW (ref 32–37)
MCV RBC AUTO: 93.1 FL — SIGNIFICANT CHANGE UP (ref 81–99)
METHOD TYPE: SIGNIFICANT CHANGE UP
MONOCYTES # BLD AUTO: 0.36 K/UL — SIGNIFICANT CHANGE UP (ref 0.1–0.6)
MONOCYTES NFR BLD AUTO: 6.9 % — SIGNIFICANT CHANGE UP (ref 1.7–9.3)
NEUTROPHILS # BLD AUTO: 2.8 K/UL — SIGNIFICANT CHANGE UP (ref 1.4–6.5)
NEUTROPHILS NFR BLD AUTO: 53.8 % — SIGNIFICANT CHANGE UP (ref 42.2–75.2)
NRBC # BLD: 0 /100 WBCS — SIGNIFICANT CHANGE UP (ref 0–0)
ORGANISM # SPEC MICROSCOPIC CNT: SIGNIFICANT CHANGE UP
ORGANISM # SPEC MICROSCOPIC CNT: SIGNIFICANT CHANGE UP
PLATELET # BLD AUTO: 271 K/UL — SIGNIFICANT CHANGE UP (ref 130–400)
POTASSIUM SERPL-MCNC: 4.5 MMOL/L — SIGNIFICANT CHANGE UP (ref 3.5–5)
POTASSIUM SERPL-SCNC: 4.5 MMOL/L — SIGNIFICANT CHANGE UP (ref 3.5–5)
PROT SERPL-MCNC: 6.6 G/DL — SIGNIFICANT CHANGE UP (ref 6–8)
RBC # BLD: 4.51 M/UL — SIGNIFICANT CHANGE UP (ref 4.2–5.4)
RBC # FLD: 12.6 % — SIGNIFICANT CHANGE UP (ref 11.5–14.5)
SODIUM SERPL-SCNC: 141 MMOL/L — SIGNIFICANT CHANGE UP (ref 135–146)
SPECIMEN SOURCE: SIGNIFICANT CHANGE UP
WBC # BLD: 5.2 K/UL — SIGNIFICANT CHANGE UP (ref 4.8–10.8)
WBC # FLD AUTO: 5.2 K/UL — SIGNIFICANT CHANGE UP (ref 4.8–10.8)

## 2021-11-25 PROCEDURE — 99232 SBSQ HOSP IP/OBS MODERATE 35: CPT

## 2021-11-25 RX ORDER — GUAIFENESIN/DEXTROMETHORPHAN 600MG-30MG
10 TABLET, EXTENDED RELEASE 12 HR ORAL EVERY 6 HOURS
Refills: 0 | Status: DISCONTINUED | OUTPATIENT
Start: 2021-11-25 | End: 2021-11-30

## 2021-11-25 RX ADMIN — POLYETHYLENE GLYCOL 3350 17 GRAM(S): 17 POWDER, FOR SOLUTION ORAL at 05:58

## 2021-11-25 RX ADMIN — ENOXAPARIN SODIUM 40 MILLIGRAM(S): 100 INJECTION SUBCUTANEOUS at 11:53

## 2021-11-25 RX ADMIN — SENNA PLUS 2 TABLET(S): 8.6 TABLET ORAL at 22:30

## 2021-11-25 RX ADMIN — Medication 40 MILLIGRAM(S): at 11:54

## 2021-11-25 RX ADMIN — ATORVASTATIN CALCIUM 20 MILLIGRAM(S): 80 TABLET, FILM COATED ORAL at 22:33

## 2021-11-25 RX ADMIN — GABAPENTIN 300 MILLIGRAM(S): 400 CAPSULE ORAL at 11:54

## 2021-11-25 RX ADMIN — POLYETHYLENE GLYCOL 3350 17 GRAM(S): 17 POWDER, FOR SOLUTION ORAL at 17:44

## 2021-11-25 NOTE — PROGRESS NOTE ADULT - SUBJECTIVE AND OBJECTIVE BOX
BEAU COBOS  70y  Female      Patient is a 70y old  Female who presents with a chief complaint of Constipation (24 Nov 2021 13:36)      INTERVAL HPI/OVERNIGHT EVENTS: no acute events overnight. no nursing concerns.       REVIEW OF SYSTEMS:  - unable to accurately obtain    VITALS  T(C): 36.5 (11-25-21 @ 12:55), Max: 36.9 (11-25-21 @ 06:34)  HR: 84 (11-25-21 @ 12:55) (57 - 84)  BP: 121/69 (11-25-21 @ 12:55) (121/69 - 140/70)  RR: 20 (11-25-21 @ 12:55) (20 - 20)  SpO2: --  Wt(kg): --Vital Signs Last 24 Hrs  T(C): 36.5 (25 Nov 2021 12:55), Max: 36.9 (25 Nov 2021 06:34)  T(F): 97.7 (25 Nov 2021 12:55), Max: 98.5 (25 Nov 2021 06:34)  HR: 84 (25 Nov 2021 12:55) (57 - 84)  BP: 121/69 (25 Nov 2021 12:55) (121/69 - 140/70)  BP(mean): --  RR: 20 (25 Nov 2021 12:55) (20 - 20)  SpO2: --        PHYSICAL EXAM:  GENERAL: eldelry F, NAD, non toxic  EYES: anicteric sclera, non injected conjunctiva, EOMI  PSYCH: no agitation, baseline mentation  NERVOUS SYSTEM:  Alert & Oriented X1  PULMONARY: Clear to percussion bilaterally; No rales, rhonchi, wheezing, or rubs  CARDIOVASCULAR: Regular rate and rhythm; No murmurs, rubs, or gallops  GI: Soft, Nontender, Nondistended; Bowel sounds present  EXTREMITIES:  2+ Peripheral Pulses, No clubbing, cyanosis, or edema  LYMPH: No lymphadenopathy noted  SKIN: No rashes or lesions    Consultant(s) Notes Reviewed:  [x ] YES  [ ] NO    Discussed with Consultants/Other Providers [ x] YES     LABS                          13.4   5.20  )-----------( 271      ( 25 Nov 2021 04:30 )             42.0     11-25    141  |  103  |  10  ----------------------------<  97  4.5   |  24  |  0.7    Ca    9.4      25 Nov 2021 04:30  Mg     1.8     11-25    TPro  6.6  /  Alb  4.0  /  TBili  0.6  /  DBili  x   /  AST  19  /  ALT  14  /  AlkPhos  87  11-25    Lactate Trend  11-21 @ 20:06 Lactate:1.3     RADIOLOGY & ADDITIONAL TESTS:  - no images 11/25  Imaging Personally Reviewed:  [ ] YES  [ ] NO    HEALTH ISSUES - PROBLEM Dx:    MEDICATIONS  (STANDING):  atorvastatin 20 milliGRAM(s) Oral at bedtime  enoxaparin Injectable 40 milliGRAM(s) SubCutaneous daily  gabapentin 300 milliGRAM(s) Oral daily  PARoxetine 40 milliGRAM(s) Oral daily  polyethylene glycol 3350 17 Gram(s) Oral two times a day  senna 2 Tablet(s) Oral at bedtime    MEDICATIONS  (PRN):  guaifenesin/dextromethorphan Oral Liquid 10 milliLiter(s) Oral every 6 hours PRN cough and/or congestion

## 2021-11-26 LAB
ALBUMIN SERPL ELPH-MCNC: 4.1 G/DL — SIGNIFICANT CHANGE UP (ref 3.5–5.2)
ALP SERPL-CCNC: 94 U/L — SIGNIFICANT CHANGE UP (ref 30–115)
ALT FLD-CCNC: 21 U/L — SIGNIFICANT CHANGE UP (ref 0–41)
ANION GAP SERPL CALC-SCNC: 16 MMOL/L — HIGH (ref 7–14)
AST SERPL-CCNC: 26 U/L — SIGNIFICANT CHANGE UP (ref 0–41)
BASOPHILS # BLD AUTO: 0.02 K/UL — SIGNIFICANT CHANGE UP (ref 0–0.2)
BASOPHILS NFR BLD AUTO: 0.4 % — SIGNIFICANT CHANGE UP (ref 0–1)
BILIRUB SERPL-MCNC: 0.7 MG/DL — SIGNIFICANT CHANGE UP (ref 0.2–1.2)
BUN SERPL-MCNC: 10 MG/DL — SIGNIFICANT CHANGE UP (ref 10–20)
CALCIUM SERPL-MCNC: 9.7 MG/DL — SIGNIFICANT CHANGE UP (ref 8.5–10.1)
CHLORIDE SERPL-SCNC: 101 MMOL/L — SIGNIFICANT CHANGE UP (ref 98–110)
CO2 SERPL-SCNC: 23 MMOL/L — SIGNIFICANT CHANGE UP (ref 17–32)
CREAT SERPL-MCNC: 0.7 MG/DL — SIGNIFICANT CHANGE UP (ref 0.7–1.5)
EOSINOPHIL # BLD AUTO: 0.14 K/UL — SIGNIFICANT CHANGE UP (ref 0–0.7)
EOSINOPHIL NFR BLD AUTO: 2.6 % — SIGNIFICANT CHANGE UP (ref 0–8)
GLUCOSE SERPL-MCNC: 101 MG/DL — HIGH (ref 70–99)
HCT VFR BLD CALC: 43.9 % — SIGNIFICANT CHANGE UP (ref 37–47)
HGB BLD-MCNC: 14.1 G/DL — SIGNIFICANT CHANGE UP (ref 12–16)
IMM GRANULOCYTES NFR BLD AUTO: 0.2 % — SIGNIFICANT CHANGE UP (ref 0.1–0.3)
LYMPHOCYTES # BLD AUTO: 1.65 K/UL — SIGNIFICANT CHANGE UP (ref 1.2–3.4)
LYMPHOCYTES # BLD AUTO: 30.9 % — SIGNIFICANT CHANGE UP (ref 20.5–51.1)
MAGNESIUM SERPL-MCNC: 1.9 MG/DL — SIGNIFICANT CHANGE UP (ref 1.8–2.4)
MCHC RBC-ENTMCNC: 29.8 PG — SIGNIFICANT CHANGE UP (ref 27–31)
MCHC RBC-ENTMCNC: 32.1 G/DL — SIGNIFICANT CHANGE UP (ref 32–37)
MCV RBC AUTO: 92.8 FL — SIGNIFICANT CHANGE UP (ref 81–99)
MONOCYTES # BLD AUTO: 0.42 K/UL — SIGNIFICANT CHANGE UP (ref 0.1–0.6)
MONOCYTES NFR BLD AUTO: 7.9 % — SIGNIFICANT CHANGE UP (ref 1.7–9.3)
NEUTROPHILS # BLD AUTO: 3.1 K/UL — SIGNIFICANT CHANGE UP (ref 1.4–6.5)
NEUTROPHILS NFR BLD AUTO: 58 % — SIGNIFICANT CHANGE UP (ref 42.2–75.2)
NRBC # BLD: 0 /100 WBCS — SIGNIFICANT CHANGE UP (ref 0–0)
PLATELET # BLD AUTO: 262 K/UL — SIGNIFICANT CHANGE UP (ref 130–400)
POTASSIUM SERPL-MCNC: 4.8 MMOL/L — SIGNIFICANT CHANGE UP (ref 3.5–5)
POTASSIUM SERPL-SCNC: 4.8 MMOL/L — SIGNIFICANT CHANGE UP (ref 3.5–5)
PROT SERPL-MCNC: 6.7 G/DL — SIGNIFICANT CHANGE UP (ref 6–8)
RBC # BLD: 4.73 M/UL — SIGNIFICANT CHANGE UP (ref 4.2–5.4)
RBC # FLD: 12.5 % — SIGNIFICANT CHANGE UP (ref 11.5–14.5)
SODIUM SERPL-SCNC: 140 MMOL/L — SIGNIFICANT CHANGE UP (ref 135–146)
WBC # BLD: 5.34 K/UL — SIGNIFICANT CHANGE UP (ref 4.8–10.8)
WBC # FLD AUTO: 5.34 K/UL — SIGNIFICANT CHANGE UP (ref 4.8–10.8)

## 2021-11-26 PROCEDURE — 99232 SBSQ HOSP IP/OBS MODERATE 35: CPT

## 2021-11-26 RX ADMIN — ATORVASTATIN CALCIUM 20 MILLIGRAM(S): 80 TABLET, FILM COATED ORAL at 22:37

## 2021-11-26 RX ADMIN — Medication 40 MILLIGRAM(S): at 14:28

## 2021-11-26 RX ADMIN — ENOXAPARIN SODIUM 40 MILLIGRAM(S): 100 INJECTION SUBCUTANEOUS at 14:29

## 2021-11-26 RX ADMIN — GABAPENTIN 300 MILLIGRAM(S): 400 CAPSULE ORAL at 14:28

## 2021-11-26 RX ADMIN — POLYETHYLENE GLYCOL 3350 17 GRAM(S): 17 POWDER, FOR SOLUTION ORAL at 05:29

## 2021-11-26 NOTE — MEDICAL STUDENT PROGRESS NOTE(EDUCATION) - NS MD HP STUD ASPLAN PLAN FT
# Constipation, resolved  - per sister, no BMs x8d prior to admission  - no stool burden on CT A/P  - multiple large BMs during admission s/p miralax, senna.   - due to improved bowel movement status, senna and peg discontinued.    # Uncomplicated cystitis  - UA w/ nitrates, LE, WBC, bacteria  - UCx E.coli  - pt nonverbal so unable to determine if symptomatic  - s/p Rocephin x4d(discontinued 11/24)    # Dementia  # H/o CVA w/ residual R hemiparesis  # Depression  - per sister, unable to care for pt at home  - likely needs placement at SNF  - cont paroxetine, gabapentin, atorvastatin  - Awaiting PT eval.     # HTN  - BP: 168/83 (133/62 - 154/72)  - Pt is not on outpatient meds for HTN  - Repeat BP. Monitor for now.     PT/REHAB: PT  ACTIVITY: Activity - Increase As Tolerated  DVT PPX: enoxaparin Injectable  GI PPX:  Not indicated  DIET: minced and moist DASH  CODE: Full code   Disposition: from home; to SNF  Pending: Per Mj of [facility], an out of network auth will be needed with [employer]. [facility] tasked for auth.  # Constipation, resolved  - per sister, no BMs x8d prior to admission  - no stool burden on CT A/P  - multiple large BMs during admission s/p miralax, senna.   - due to improved bowel movement status, senna and peg discontinued.    # Uncomplicated cystitis  - UA w/ nitrates, LE, WBC, bacteria  - UCx E.coli  - pt nonverbal so unable to determine if symptomatic  - s/p Rocephin x4d(discontinued 11/24)    # Dementia  # H/o CVA w/ residual R hemiparesis  # Depression  - per sister, unable to care for pt at home  - likely needs placement at SNF  - cont paroxetine, gabapentin, atorvastatin     # HTN  - BP: 168/83 (133/62 - 154/72)  - Pt is not on outpatient meds for HTN  - Repeat BP. Monitor for now.     PT/REHAB: PT  ACTIVITY: Activity - Increase As Tolerated  DVT PPX: enoxaparin Injectable  GI PPX:  Not indicated  DIET: minced and moist DASH  CODE: Full code   Disposition: from home; to SNF  Pending: Per [facility] of [facility], an out of network auth will be needed with [employer]. [facility] tasked for auth.  # Constipation, resolved  - per sister, no BMs x8d prior to admission  - no stool burden on CT A/P  - multiple large BMs during admission s/p miralax, senna.   - due to improved bowel movement status, senna and peg discontinued.    # Uncomplicated cystitis  - UA w/ nitrates, LE, WBC, bacteria  - UCx E.coli  - pt nonverbal so unable to determine if symptomatic  - s/p Rocephin x4d(discontinued 11/24)    # Dementia  # H/o CVA w/ residual R hemiparesis  # Depression  - per sister, unable to care for pt at home  - likely needs placement at SNF  - cont paroxetine, gabapentin, atorvastatin     # HTN  - BP: 168/83 -11/26 am. (133/62 - 154/72)  - Pt is not on outpatient meds for HTN  - Repeat BP improved to 138/64 -11/26pm. Monitor for now.     PT/REHAB: PT  ACTIVITY: Activity - Increase As Tolerated  DVT PPX: enoxaparin Injectable  GI PPX:  Not indicated  DIET: minced and moist DASH  CODE: Full code   Disposition: from home; to SNF  Pending: Per Mj of [facility], an out of network auth will be needed with [employer]. [facility] tasked for auth.

## 2021-11-26 NOTE — PROGRESS NOTE ADULT - SUBJECTIVE AND OBJECTIVE BOX
BEAU COBOS  70y  Female      Patient is a 70y old  Female who presents with a chief complaint of Constipation (25 Nov 2021 13:13)      INTERVAL HPI/OVERNIGHT EVENTS: no acute events overnight. no nursing concerns.       REVIEW OF SYSTEMS:  CONSTITUTIONAL: No fever, weight loss, or fatigue  RESPIRATORY: No cough, wheezing, chills or hemoptysis; No shortness of breath  CARDIOVASCULAR: No chest pain, palpitations, dizziness, or leg swelling  GASTROINTESTINAL: No abdominal or epigastric pain. No nausea, vomiting, or hematemesis; No diarrhea or constipation. No melena or hematochezia.  GENITOURINARY: No dysuria, frequency, hematuria, or incontinence  NEUROLOGICAL: No headaches, memory loss, loss of strength, numbness, or tremors  SKIN: No itching, burning, rashes, or lesions   MUSCULOSKELETAL: No joint pain or swelling; No muscle, back, or extremity pain  PSYCHIATRIC: No depression, anxiety, mood swings, or difficulty sleeping  All other review of systems negative    VITALS  T(C): 36.7 (11-26-21 @ 05:28), Max: 36.7 (11-26-21 @ 05:28)  HR: 60 (11-26-21 @ 05:28) (60 - 84)  BP: 168/83 (11-26-21 @ 05:28) (121/69 - 168/83)  RR: 17 (11-26-21 @ 05:28) (17 - 20)  SpO2: 97% (11-26-21 @ 05:28) (97% - 97%)  Wt(kg): --Vital Signs Last 24 Hrs  T(C): 36.7 (26 Nov 2021 05:28), Max: 36.7 (26 Nov 2021 05:28)  T(F): 98 (26 Nov 2021 05:28), Max: 98 (26 Nov 2021 05:28)  HR: 60 (26 Nov 2021 05:28) (60 - 84)  BP: 168/83 (26 Nov 2021 05:28) (121/69 - 168/83)  BP(mean): --  RR: 17 (26 Nov 2021 05:28) (17 - 20)  SpO2: 97% (26 Nov 2021 05:28) (97% - 97%)    PHYSICAL EXAM:  GENERAL: eldelry F, NAD, non toxic  EYES: anicteric sclera, non injected conjunctiva, EOMI  PSYCH: no agitation, baseline mentation  NERVOUS SYSTEM:  Alert & Oriented X1  PULMONARY: Clear to percussion bilaterally; No rales, rhonchi, wheezing, or rubs  CARDIOVASCULAR: Regular rate and rhythm; No murmurs, rubs, or gallops  GI: Soft, Nontender, Nondistended; Bowel sounds present  EXTREMITIES:  2+ Peripheral Pulses, No clubbing, cyanosis, or edema  LYMPH: No lymphadenopathy noted  SKIN: No rashes or lesions      Consultant(s) Notes Reviewed:  [x ] YES  [ ] NO    Discussed with Consultants/Other Providers [ x] YES     LABS                          14.1   5.34  )-----------( 262      ( 26 Nov 2021 04:30 )             43.9     11-26    140  |  101  |  10  ----------------------------<  101<H>  4.8   |  23  |  0.7    Ca    9.7      26 Nov 2021 04:30  Mg     1.9     11-26    TPro  6.7  /  Alb  4.1  /  TBili  0.7  /  DBili  x   /  AST  26  /  ALT  21  /  AlkPhos  94  11-26  Lactate Trend  11-21 @ 20:06 Lactate:1.3     RADIOLOGY & ADDITIONAL TESTS:  - no imaging 11/26  Imaging Personally Reviewed:  [ ] YES  [ ] NO    HEALTH ISSUES - PROBLEM Dx:      MEDICATIONS  (STANDING):  atorvastatin 20 milliGRAM(s) Oral at bedtime  enoxaparin Injectable 40 milliGRAM(s) SubCutaneous daily  gabapentin 300 milliGRAM(s) Oral daily  PARoxetine 40 milliGRAM(s) Oral daily    MEDICATIONS  (PRN):  guaifenesin/dextromethorphan Oral Liquid 10 milliLiter(s) Oral every 6 hours PRN cough and/or congestion

## 2021-11-26 NOTE — MEDICAL STUDENT PROGRESS NOTE(EDUCATION) - SUBJECTIVE AND OBJECTIVE BOX
SUBJECTIVE     History of Present Illness:   69 y/o female with PMHx of dementia (mostly nonverbal), CVA w/ Right sided residual hemiparesis, HTN, depression brought into the ED by sister for worsening constipation for 8 days prior to presentation. Per ED note, history is obtain by sister (Tasha Fields) who reports that despite use of OTC stool softeners, patient had a bowel movement for 8 days, and that it has been difficult for the sister to care for the patient at home. Per ED note, during presentation, pt denies any recent fevers, chills, SOB, abdominal pain, vomiting. At baseline patient is mostly bedbound, mostly nonverbal. VS in ED is noted for T 96, HR 65, /67. Labs notable for Lipase 94. UA +nitrates, LE, bacteria, WBC. CT ab/pelvis negative for acute pathology. Pt was started on Ceftriaxone in ED, pt completed 3d course for cystitis, as pt is non verbal, its difficult to obtain an ROS during hospital stay. Urine cultures are pending. Speech/ Swallow eval completed and diet recommendations in place.  consulted for placement into Nursing Home. Obtaining ROS today was again limited by pt's non-verbal status.          MEDICATIONS  (STANDING):  atorvastatin 20 milliGRAM(s) Oral at bedtime  enoxaparin Injectable 40 milliGRAM(s) SubCutaneous daily  gabapentin 300 milliGRAM(s) Oral daily  PARoxetine 40 milliGRAM(s) Oral daily  polyethylene glycol 3350 17 Gram(s) Oral two times a day  senna 2 Tablet(s) Oral at bedtime    MEDICATIONS  (PRN):  guaifenesin/dextromethorphan Oral Liquid 10 milliLiter(s) Oral every 6 hours PRN cough and/or congestion        ROS:    Review of systems was difficult to obtain due to pt being mostly non verbal.     OBJECTIVE     Physical exam     - GENERAL: AAOx 2 (To person and place only). No acute distress. Thin and drowsy on PE.       - EYES: Anicteric.      - HENT: Moist mucous membranes. No cervical lymphadenopathy.    - LUNGS: Clear to auscultation bilaterally. +Poor inspiratory effort.     - CARDIOVASCULAR: Regular rate and rhythm. No murmur. No JVD.      - ABDOMEN: Soft, non-tender and non-distended. Bowel sounds present on all 4 quadrants. No palpable masses.    - EXTREMITIES: Mild LE edema. Non-tender.    - SKIN: No rashes or lesions. Warm.      - NEUROLOGIC: +Right sided residual hemiparesis.     - PSYCHIATRIC: Cooperative. Appropriate mood and affect.      VITALS    T(F):   HR:   BP:   RR:  SpO2: --    Consultant(s) Notes Reviewed:  [x ] YES  [ ] NO    Discussed with Consultants/Other Providers [ x] YES     LABS                          13.4   5.20  )-----------( 271      ( 25 Nov 2021 04:30 )             42.0     11-25    141  |  103  |  10  ----------------------------<  97  4.5   |  24  |  0.7    Ca    9.4      25 Nov 2021 04:30  Mg     1.8     11-25    TPro  6.6  /  Alb  4.0  /  TBili  0.6  /  DBili  x   /  AST  19  /  ALT  14  /  AlkPhos  87  11-25      Lactate Trend  11-21 @ 20:06 Lactate:1.3     RADIOLOGY & ADDITIONAL TESTS:  - no images 11/25  Imaging Personally Reviewed:  [ ] YES  [ ] NO    HEALTH ISSUES - PROBLEM Dx:  Principal: Acute UTI  Secondary: Constipation, social issue.             SUBJECTIVE     History of Present Illness:   69 y/o female with PMHx of dementia (mostly nonverbal), CVA w/ Right sided residual hemiparesis, HTN, depression brought into the ED by sister for worsening constipation for 8 days prior to presentation. Per ED note, history is obtain by sister (Tasha Fields) who reports that despite use of OTC stool softeners, patient had a bowel movement for 8 days, and that it has been difficult for the sister to care for the patient at home. Per ED note, during presentation, pt denies any recent fevers, chills, SOB, abdominal pain, vomiting. At baseline patient is mostly bedbound, mostly nonverbal. VS in ED is noted for T 96, HR 65, /67. Labs notable for Lipase 94. UA +nitrates, LE, bacteria, WBC. CT ab/pelvis negative for acute pathology. Pt was started on Ceftriaxone in ED, pt completed 3d course for cystitis, as pt is non verbal, its difficult to obtain an ROS during hospital stay. Urine cultures are pending. Speech/ Swallow eval completed and diet recommendations in place.  consulted for placement into Nursing Home. Obtaining ROS today was again limited by pt's non-verbal status.     ROS:    Review of systems was difficult to obtain due to pt being mostly non verbal.       OBJECTIVE     Physical exam     - GENERAL: AAOx 2 (To person and place only). No acute distress. Elderly and drowsy on PE.       - EYES: Anicteric.      - HENT: Moist mucous membranes. No cervical lymphadenopathy.    - LUNGS: Clear to auscultation bilaterally. +Poor inspiratory effort.     - CARDIOVASCULAR: Regular rate and rhythm. No murmur. No JVD.      - ABDOMEN: Soft, non-tender and non-distended. Bowel sounds present on all 4 quadrants. No palpable masses.    - EXTREMITIES: Mild LE edema. Non-tender.    - SKIN: No rashes or lesions. Warm.      - NEUROLOGIC: +Right sided residual hemiparesis.     - PSYCHIATRIC: Cooperative. Appropriate mood and affect.      VITALS     T(C): 36.7 (11-26-21 @ 05:28), Max: 36.7 (11-26-21 @ 05:28)  HR: 60 (11-26-21 @ 05:28) (60 - 84)  BP: 168/83 (11-26-21 @ 05:28) (121/69 - 168/83)  RR: 17 (11-26-21 @ 05:28) (17 - 20)  SpO2: 97% (11-26-21 @ 05:28) (97% - 97%)  Wt(kg): --Vital Signs Last 24 Hrs  LABS                          14.1   5.34  )-----------( 262      ( 26 Nov 2021 04:30 )             43.9     11-26    140  |  101  |  10  ----------------------------<  101<H>  4.8   |  23  |  0.7    Ca    9.7      26 Nov 2021 04:30  Mg     1.9     11-26    TPro  6.7  /  Alb  4.1  /  TBili  0.7  /  DBili  x   /  AST  26  /  ALT  21  /  AlkPhos  94  11-26  Lactate Trend  11-21 @ 20:06 Lactate:1.3     RADIOLOGY & ADDITIONAL TESTS:  - no imaging 11/26  Imaging Personally Reviewed:  [ ] YES  [ ] NO    HEALTH ISSUES - PROBLEM Dx:      MEDICATIONS  (STANDING):  atorvastatin 20 milliGRAM(s) Oral at bedtime  enoxaparin Injectable 40 milliGRAM(s) SubCutaneous daily  gabapentin 300 milliGRAM(s) Oral daily  PARoxetine 40 milliGRAM(s) Oral daily    MEDICATIONS  (PRN):  guaifenesin/dextromethorphan Oral Liquid 10 milliLiter(s) Oral every 6 hours PRN cough and/or congestion           SUBJECTIVE     History of Present Illness:   71 y/o female with PMHx of dementia (mostly nonverbal), CVA w/ Right sided residual hemiparesis, HTN, depression brought into the ED by sister for worsening constipation for 8 days prior to presentation. Per ED note, history is obtain by sister (Tasha Fields) who reports that despite use of OTC stool softeners, patient had a bowel movement for 8 days, and that it has been difficult for the sister to care for the patient at home. Per ED note, during presentation, pt denies any recent fevers, chills, SOB, abdominal pain, vomiting. At baseline patient is mostly bedbound, mostly nonverbal. VS in ED is noted for T 96, HR 65, /67. Labs notable for Lipase 94. UA +nitrates, LE, bacteria, WBC. CT ab/pelvis negative for acute pathology. Pt was started on Ceftriaxone in ED, pt completed 3d course for cystitis, as pt is non verbal, its difficult to obtain an ROS during hospital stay. Urine cultures are pending. Speech/ Swallow eval completed and diet recommendations in place.  consulted for placement into Nursing Home. Obtaining ROS today was again limited by pt's non-verbal status.     ROS:    Review of systems was difficult to obtain due to pt being mostly non verbal.       OBJECTIVE     Physical exam     - GENERAL: AAOx 2 (To person and place only). No acute distress. Elderly and drowsy on PE.       - EYES: Anicteric.      - HENT: Moist mucous membranes. No cervical lymphadenopathy.    - LUNGS: Clear to auscultation bilaterally. +Poor inspiratory effort.     - CARDIOVASCULAR: Regular rate and rhythm. No murmur. No JVD.      - ABDOMEN: Soft, non-tender and non-distended. Bowel sounds present on all 4 quadrants. No palpable masses.    - EXTREMITIES: Mild b/l LE edema. Non-tender.    - SKIN: No rashes or lesions. Warm.      - NEUROLOGIC: +Right sided residual hemiparesis.     - PSYCHIATRIC: Cooperative. Appropriate mood and affect.      VITALS     T(C): 36.7 (11-26-21 @ 05:28), Max: 36.7 (11-26-21 @ 05:28)  HR: 60 (11-26-21 @ 05:28) (60 - 84)  BP: 168/83 (11-26-21 @ 05:28) (121/69 - 168/83)  RR: 17 (11-26-21 @ 05:28) (17 - 20)  SpO2: 97% (11-26-21 @ 05:28) (97% - 97%)  Wt(kg): --Vital Signs Last 24 Hrs  LABS                          14.1   5.34  )-----------( 262      ( 26 Nov 2021 04:30 )             43.9     11-26    140  |  101  |  10  ----------------------------<  101<H>  4.8   |  23  |  0.7    Ca    9.7      26 Nov 2021 04:30  Mg     1.9     11-26    TPro  6.7  /  Alb  4.1  /  TBili  0.7  /  DBili  x   /  AST  26  /  ALT  21  /  AlkPhos  94  11-26  Lactate Trend  11-21 @ 20:06 Lactate:1.3     RADIOLOGY & ADDITIONAL TESTS:  - no imaging 11/26  Imaging Personally Reviewed:  [ ] YES  [ ] NO    HEALTH ISSUES - PROBLEM Dx:      MEDICATIONS  (STANDING):  atorvastatin 20 milliGRAM(s) Oral at bedtime  enoxaparin Injectable 40 milliGRAM(s) SubCutaneous daily  gabapentin 300 milliGRAM(s) Oral daily  PARoxetine 40 milliGRAM(s) Oral daily    MEDICATIONS  (PRN):  guaifenesin/dextromethorphan Oral Liquid 10 milliLiter(s) Oral every 6 hours PRN cough and/or congestion

## 2021-11-27 LAB
ALBUMIN SERPL ELPH-MCNC: 3.8 G/DL — SIGNIFICANT CHANGE UP (ref 3.5–5.2)
ALP SERPL-CCNC: 88 U/L — SIGNIFICANT CHANGE UP (ref 30–115)
ALT FLD-CCNC: 19 U/L — SIGNIFICANT CHANGE UP (ref 0–41)
ANION GAP SERPL CALC-SCNC: 17 MMOL/L — HIGH (ref 7–14)
AST SERPL-CCNC: 22 U/L — SIGNIFICANT CHANGE UP (ref 0–41)
BASOPHILS # BLD AUTO: 0.02 K/UL — SIGNIFICANT CHANGE UP (ref 0–0.2)
BASOPHILS NFR BLD AUTO: 0.4 % — SIGNIFICANT CHANGE UP (ref 0–1)
BILIRUB SERPL-MCNC: 0.6 MG/DL — SIGNIFICANT CHANGE UP (ref 0.2–1.2)
BUN SERPL-MCNC: 11 MG/DL — SIGNIFICANT CHANGE UP (ref 10–20)
CALCIUM SERPL-MCNC: 9.2 MG/DL — SIGNIFICANT CHANGE UP (ref 8.5–10.1)
CHLORIDE SERPL-SCNC: 103 MMOL/L — SIGNIFICANT CHANGE UP (ref 98–110)
CO2 SERPL-SCNC: 22 MMOL/L — SIGNIFICANT CHANGE UP (ref 17–32)
CREAT SERPL-MCNC: 0.6 MG/DL — LOW (ref 0.7–1.5)
EOSINOPHIL # BLD AUTO: 0.11 K/UL — SIGNIFICANT CHANGE UP (ref 0–0.7)
EOSINOPHIL NFR BLD AUTO: 2 % — SIGNIFICANT CHANGE UP (ref 0–8)
GLUCOSE SERPL-MCNC: 97 MG/DL — SIGNIFICANT CHANGE UP (ref 70–99)
HCT VFR BLD CALC: 42.4 % — SIGNIFICANT CHANGE UP (ref 37–47)
HGB BLD-MCNC: 13.6 G/DL — SIGNIFICANT CHANGE UP (ref 12–16)
IMM GRANULOCYTES NFR BLD AUTO: 0.4 % — HIGH (ref 0.1–0.3)
LYMPHOCYTES # BLD AUTO: 1.99 K/UL — SIGNIFICANT CHANGE UP (ref 1.2–3.4)
LYMPHOCYTES # BLD AUTO: 36.4 % — SIGNIFICANT CHANGE UP (ref 20.5–51.1)
MAGNESIUM SERPL-MCNC: 1.8 MG/DL — SIGNIFICANT CHANGE UP (ref 1.8–2.4)
MCHC RBC-ENTMCNC: 29.5 PG — SIGNIFICANT CHANGE UP (ref 27–31)
MCHC RBC-ENTMCNC: 32.1 G/DL — SIGNIFICANT CHANGE UP (ref 32–37)
MCV RBC AUTO: 92 FL — SIGNIFICANT CHANGE UP (ref 81–99)
MONOCYTES # BLD AUTO: 0.38 K/UL — SIGNIFICANT CHANGE UP (ref 0.1–0.6)
MONOCYTES NFR BLD AUTO: 6.9 % — SIGNIFICANT CHANGE UP (ref 1.7–9.3)
NEUTROPHILS # BLD AUTO: 2.95 K/UL — SIGNIFICANT CHANGE UP (ref 1.4–6.5)
NEUTROPHILS NFR BLD AUTO: 53.9 % — SIGNIFICANT CHANGE UP (ref 42.2–75.2)
NRBC # BLD: 0 /100 WBCS — SIGNIFICANT CHANGE UP (ref 0–0)
PLATELET # BLD AUTO: 245 K/UL — SIGNIFICANT CHANGE UP (ref 130–400)
POTASSIUM SERPL-MCNC: 4.4 MMOL/L — SIGNIFICANT CHANGE UP (ref 3.5–5)
POTASSIUM SERPL-SCNC: 4.4 MMOL/L — SIGNIFICANT CHANGE UP (ref 3.5–5)
PROT SERPL-MCNC: 6.5 G/DL — SIGNIFICANT CHANGE UP (ref 6–8)
RBC # BLD: 4.61 M/UL — SIGNIFICANT CHANGE UP (ref 4.2–5.4)
RBC # FLD: 12.7 % — SIGNIFICANT CHANGE UP (ref 11.5–14.5)
SODIUM SERPL-SCNC: 142 MMOL/L — SIGNIFICANT CHANGE UP (ref 135–146)
WBC # BLD: 5.47 K/UL — SIGNIFICANT CHANGE UP (ref 4.8–10.8)
WBC # FLD AUTO: 5.47 K/UL — SIGNIFICANT CHANGE UP (ref 4.8–10.8)

## 2021-11-27 PROCEDURE — 99232 SBSQ HOSP IP/OBS MODERATE 35: CPT

## 2021-11-27 RX ADMIN — ENOXAPARIN SODIUM 40 MILLIGRAM(S): 100 INJECTION SUBCUTANEOUS at 11:18

## 2021-11-27 RX ADMIN — GABAPENTIN 300 MILLIGRAM(S): 400 CAPSULE ORAL at 11:19

## 2021-11-27 RX ADMIN — Medication 40 MILLIGRAM(S): at 11:18

## 2021-11-27 RX ADMIN — ATORVASTATIN CALCIUM 20 MILLIGRAM(S): 80 TABLET, FILM COATED ORAL at 22:28

## 2021-11-27 NOTE — MEDICAL STUDENT PROGRESS NOTE(EDUCATION) - NS MD HP STUD ASPLAN PLAN FT
#Pending nursing home placement   # Constipation, resolved  - per sister, no BMs x8d prior to admission  - no stool burden on CT A/P  - multiple large BMs during admission s/p miralax, senna.   - due to improved bowel movement status, senna and peg discontinued. Pt has a good appetite. Will monitor for now.     # Uncomplicated cystitis  - UA w/ nitrates, LE, WBC, bacteria  - UCx E.coli  - pt nonverbal so unable to determine if symptomatic  - s/p Rocephin x4d(discontinued 11/24)    # Dementia  # H/o CVA w/ residual R hemiparesis  # Depression  - per sister, unable to care for pt at home  - likely needs placement at SNF  - cont paroxetine, gabapentin, atorvastatin     # HTN  - BP improved: 114/57 (114/57 - 138/64)   - Pt is not on outpatient meds for HTN  - Monitor for now.     PT/REHAB: PT  ACTIVITY: Activity - Increase As Tolerated  DVT PPX: enoxaparin Injectable  GI PPX:  Not indicated  DIET: minced and moist DASH  CODE: Full code   Disposition: from home; to SNF  Pending: Per Mj of [facility], an out of network auth will be needed with [employer]. [facility] tasked for auth.  #Pending nursing home placement   # Constipation, resolved  - per sister, no BMs x8d prior to admission  - no stool burden on CT A/P  - multiple large BMs during admission s/p miralax, senna.   - due to improved bowel movement status, senna and peg discontinued. Pt has a good appetite. Will monitor for now.     # Uncomplicated cystitis  - UA w/ nitrates, LE, WBC, bacteria  - UCx E.coli  - pt nonverbal so unable to determine if symptomatic  - s/p Rocephin x4d(discontinued 11/24)    # Dementia  # H/o CVA w/ residual R hemiparesis  # Depression  - per sister, unable to care for pt at home. Placement pending at SNF.   - cont paroxetine, gabapentin, atorvastatin     # HTN  - BP improved: 114/57 (114/57 - 138/64)   - Pt is not on outpatient meds for HTN  - Monitor for now.     PT/REHAB: PT  ACTIVITY: Activity - Increase As Tolerated  DVT PPX: enoxaparin Injectable  GI PPX:  Not indicated  DIET: minced and moist DASH  CODE: Full code   Disposition: from home; to SNF  Pending: Per Mj of [facility], an out of network auth will be needed with [employer]. [facility] tasked for auth.    # Constipation, resolved  - per sister, no BMs x8d prior to admission  - no stool burden on CT A/P  - multiple large BMs during admission s/p miralax, senna.   - due to improved bowel movement status, senna and peg discontinued. Pt has a good appetite. Will monitor for now.     # Uncomplicated cystitis  - UA w/ nitrates, LE, WBC, bacteria  - UCx E.coli  - pt nonverbal so unable to determine if symptomatic  - s/p Rocephin x4d(discontinued 11/24)    # Dementia  # H/o CVA w/ residual R hemiparesis  # Depression  - per sister, unable to care for pt at home. Placement pending at SNF.   - cont paroxetine, gabapentin, atorvastatin     # HTN  - BP improved: 114/57 (114/57 - 138/64)   - Pt is not on outpatient meds for HTN  - Monitor for now.     PT/REHAB: PT  ACTIVITY: Activity - Increase As Tolerated  DVT PPX: enoxaparin Injectable  GI PPX:  Not indicated  DIET: minced and moist DASH  CODE: Full code   Disposition: from home; to SNF  Pending: Per Mj of [facility], an out of network auth will be needed with [employer]. [facility] tasked for auth.

## 2021-11-27 NOTE — MEDICAL STUDENT PROGRESS NOTE(EDUCATION) - NS MD HP STUD ASPLAN ASSES FT
69 y/o female with PMHx of dementia (mostly nonverbal), CVA w/ Right sided residual hemiparesis, HTN, depression brought into the ED by sister for worsening constipation, with not having passed a bowel movement for 8 days prior to admission. Over the course of hospital stay, constipation has resolved s/p miralax and senna.
69 y/o female with PMHx of dementia (mostly nonverbal), CVA w/ Right sided residual hemiparesis, HTN, depression brought into the ED by sister for worsening constipation, with not having passed a bowel movement for 8 days prior to admission. Over the course of hospital stay, constipation has resolved s/p miralax and senna.

## 2021-11-27 NOTE — MEDICAL STUDENT PROGRESS NOTE(EDUCATION) - SUBJECTIVE AND OBJECTIVE BOX
SUBJECTIVE     History of Present Illness:   71 y/o female with PMHx of dementia (mostly nonverbal), CVA w/ Right sided residual hemiparesis, HTN, depression brought into the ED by sister for worsening constipation for 8 days prior to presentation. Per ED note, history is obtain by sister (Tasha Fields) who reports that despite use of OTC stool softeners, patient had a bowel movement for 8 days, and that it has been difficult for the sister to care for the patient at home. Per ED note, during presentation, pt denies any recent fevers, chills, SOB, abdominal pain, vomiting. At baseline patient is mostly bedbound, mostly nonverbal. VS in ED is noted for T 96, HR 65, /67. Labs notable for Lipase 94. UA +nitrates, LE, bacteria, WBC. CT ab/pelvis negative for acute pathology. Pt was started on Ceftriaxone in ED, pt completed 3d course for cystitis, as pt is non verbal, its difficult to obtain an ROS during hospital stay. Urine cultures-Ecoli. Speech/ Swallow eval completed and diet recommendations in place.  working on for pending placement into Nursing Home. Obtaining ROS today was again limited by pt's non-verbal status. Pt appears to be in good appetite and appropriate mood.      ROS:    Review of systems was difficult to obtain due to pt being mostly non verbal.       OBJECTIVE     Physical exam     - GENERAL: AAOx 2 (To person and place only). No acute distress. Elderly, thin, willing to eat, has a good appetite.       - EYES: Anicteric.      - HENT: Moist mucous membranes. No cervical lymphadenopathy.    - LUNGS: Clear to auscultation bilaterally. +Poor inspiratory effort.     - CARDIOVASCULAR: Regular rate and rhythm. No murmur. No JVD.      - ABDOMEN: Soft, non-tender and non-distended. Bowel sounds present on all 4 quadrants. No palpable masses.    - EXTREMITIES: Mild b/l LE edema. Non pitting. Non-tender.    - SKIN: No rashes or lesions. Warm.      - NEUROLOGIC: +Right sided residual hemiparesis.     - PSYCHIATRIC: Cooperative. Appropriate mood and affect.      VITALS:  Tcurr:36.4° NaN° Tmax:36.4° @ 27 Nov 06:05NaN° @   HR: 66 (63 - 66)   BP: 114/57 (114/57 - 138/64)   RR: 18 (18 - 18)    I & Os   	7A-7P	7P-7A	24h	7A-  Oral Fluid	352	120	472	0  Total In	352	120	472	0  Total Out	0	0	0	0  TOTAL NET	352	120	472	0    LABS     142	103	11	( 97	- 9.2  - 1.8  - x	11/27/21  04:30  4.4	22	0.6	 	 	     5.47 ) 	13.6	( 245	11/27/21  04:30   	42.4	 	     AST  22	AlkP  88	TBili  0.6	Prot  6.5	11/27/21  04:30  19  ALT	 	x  DBili	3.8  Alb	     Ca    9.7      26 Nov 2021 04:30  Mg     1.9     11-26    TPro  6.7  /  Alb  4.1  /  TBili  0.7  /  DBili  x   /  AST  26  /  ALT  21  /  AlkPhos  94  11-26  Lactate Trend  11-21 @ 20:06 Lactate:1.3     RADIOLOGY & ADDITIONAL TESTS:  - no imaging 11/26  Imaging Personally Reviewed:  [ ] YES  [ ] NO    HEALTH ISSUES - PROBLEM Dx:      MEDICATIONS  (STANDING):  atorvastatin 20 milliGRAM(s) Oral at bedtime  enoxaparin Injectable 40 milliGRAM(s) SubCutaneous daily  gabapentin 300 milliGRAM(s) Oral daily  PARoxetine 40 milliGRAM(s) Oral daily    MEDICATIONS  (PRN):  guaifenesin/dextromethorphan Oral Liquid 10 milliLiter(s) Oral every 6 hours PRN cough and/or congestion

## 2021-11-27 NOTE — PROGRESS NOTE ADULT - SUBJECTIVE AND OBJECTIVE BOX
BEAU COBOS  70y  Female      Patient is a 70y old  Female who presents with a chief complaint of Constipation (26 Nov 2021 11:15)      INTERVAL HPI/OVERNIGHT EVENTS: no acute events overnight. no nursing concerns.       REVIEW OF SYSTEMS:  - unable to accurately obtain    VITALS  T(C): 36.4 (11-27-21 @ 06:05), Max: 36.4 (11-26-21 @ 11:59)  HR: 66 (11-27-21 @ 06:05) (63 - 66)  BP: 114/57 (11-27-21 @ 06:05) (114/57 - 138/64)  RR: 18 (11-27-21 @ 06:05) (18 - 18)  SpO2: --  Wt(kg): --Vital Signs Last 24 Hrs  T(C): 36.4 (27 Nov 2021 06:05), Max: 36.4 (26 Nov 2021 11:59)  T(F): 97.6 (27 Nov 2021 06:05), Max: 97.6 (26 Nov 2021 11:59)  HR: 66 (27 Nov 2021 06:05) (63 - 66)  BP: 114/57 (27 Nov 2021 06:05) (114/57 - 138/64)  BP(mean): --  RR: 18 (27 Nov 2021 06:05) (18 - 18)  SpO2: --      11-26-21 @ 07:01  -  11-27-21 @ 07:00  --------------------------------------------------------  IN: 472 mL / OUT: 0 mL / NET: 472 mL        PHYSICAL EXAM:  GENERAL: eldelry F, NAD, non toxic  EYES: anicteric sclera, non injected conjunctiva, EOMI  PSYCH: no agitation, baseline mentation  NERVOUS SYSTEM:  Alert & Oriented X1  PULMONARY: Clear to percussion bilaterally; No rales, rhonchi, wheezing, or rubs  CARDIOVASCULAR: Regular rate and rhythm; No murmurs, rubs, or gallops  GI: Soft, Nontender, Nondistended; Bowel sounds present  EXTREMITIES:  2+ Peripheral Pulses, No clubbing, cyanosis, or edema  LYMPH: No lymphadenopathy noted  SKIN: No rashes or lesions      Consultant(s) Notes Reviewed:  [x ] YES  [ ] NO    Discussed with Consultants/Other Providers [ x] YES     LABS                          13.6   5.47  )-----------( 245      ( 27 Nov 2021 04:30 )             42.4     11-27    142  |  103  |  11  ----------------------------<  97  4.4   |  22  |  0.6<L>    Ca    9.2      27 Nov 2021 04:30  Mg     1.8     11-27    TPro  6.5  /  Alb  3.8  /  TBili  0.6  /  DBili  x   /  AST  22  /  ALT  19  /  AlkPhos  88  11-27      RADIOLOGY & ADDITIONAL TESTS:  - no images 11/27  Imaging Personally Reviewed:  [ ] YES  [ ] NO    HEALTH ISSUES - PROBLEM Dx:    MEDICATIONS  (STANDING):  atorvastatin 20 milliGRAM(s) Oral at bedtime  enoxaparin Injectable 40 milliGRAM(s) SubCutaneous daily  gabapentin 300 milliGRAM(s) Oral daily  PARoxetine 40 milliGRAM(s) Oral daily    MEDICATIONS  (PRN):  guaifenesin/dextromethorphan Oral Liquid 10 milliLiter(s) Oral every 6 hours PRN cough and/or congestion

## 2021-11-28 PROCEDURE — 99232 SBSQ HOSP IP/OBS MODERATE 35: CPT

## 2021-11-28 RX ADMIN — ENOXAPARIN SODIUM 40 MILLIGRAM(S): 100 INJECTION SUBCUTANEOUS at 12:08

## 2021-11-28 RX ADMIN — Medication 40 MILLIGRAM(S): at 12:12

## 2021-11-28 RX ADMIN — ATORVASTATIN CALCIUM 20 MILLIGRAM(S): 80 TABLET, FILM COATED ORAL at 22:38

## 2021-11-28 RX ADMIN — GABAPENTIN 300 MILLIGRAM(S): 400 CAPSULE ORAL at 12:10

## 2021-11-28 NOTE — PROGRESS NOTE ADULT - SUBJECTIVE AND OBJECTIVE BOX
BEAU COBOS  70y  Female      Patient is a 70y old  Female who presents with a chief complaint of Constipation (27 Nov 2021 11:43)      INTERVAL HPI/OVERNIGHT EVENTS: no acute events overnight. no nursing concerns.       REVIEW OF SYSTEMS:  unable to accurately obtain    VITALS  T(C): 35.8 (11-27-21 @ 21:06), Max: 36.1 (11-27-21 @ 11:58)  HR: 61 (11-27-21 @ 21:06) (61 - 62)  BP: 141/68 (11-27-21 @ 21:06) (141/68 - 150/65)  RR: 18 (11-27-21 @ 21:06) (18 - 20)  SpO2: --  Wt(kg): --Vital Signs Last 24 Hrs  T(C): 35.8 (27 Nov 2021 21:06), Max: 36.1 (27 Nov 2021 11:58)  T(F): 96.5 (27 Nov 2021 21:06), Max: 97 (27 Nov 2021 11:58)  HR: 61 (27 Nov 2021 21:06) (61 - 62)  BP: 141/68 (27 Nov 2021 21:06) (141/68 - 150/65)  BP(mean): --  RR: 18 (27 Nov 2021 21:06) (18 - 20)  SpO2: --      11-27-21 @ 07:01  -  11-28-21 @ 07:00  --------------------------------------------------------  IN: 120 mL / OUT: 0 mL / NET: 120 mL        PHYSICAL EXAM:  GENERAL: eldelry F, NAD, non toxic  EYES: anicteric sclera, non injected conjunctiva, EOMI  PSYCH: no agitation, baseline mentation  NERVOUS SYSTEM:  Alert & Oriented X1  PULMONARY: Clear to percussion bilaterally; No rales, rhonchi, wheezing, or rubs  CARDIOVASCULAR: Regular rate and rhythm; No murmurs, rubs, or gallops  GI: Soft, Nontender, Nondistended; Bowel sounds present  EXTREMITIES:  2+ Peripheral Pulses, No clubbing, cyanosis, or edema  LYMPH: No lymphadenopathy noted  SKIN: No rashes or lesions    Consultant(s) Notes Reviewed:  [x ] YES  [ ] NO    Discussed with Consultants/Other Providers [ x] YES     LABS                          13.6   5.47  )-----------( 245      ( 27 Nov 2021 04:30 )             42.4     11-27    142  |  103  |  11  ----------------------------<  97  4.4   |  22  |  0.6<L>    Ca    9.2      27 Nov 2021 04:30  Mg     1.8     11-27    TPro  6.5  /  Alb  3.8  /  TBili  0.6  /  DBili  x   /  AST  22  /  ALT  19  /  AlkPhos  88  11-27    RADIOLOGY & ADDITIONAL TESTS:  - no images 11/28  Imaging Personally Reviewed:  [ ] YES  [ ] NO    HEALTH ISSUES - PROBLEM Dx:    MEDICATIONS  (STANDING):  atorvastatin 20 milliGRAM(s) Oral at bedtime  enoxaparin Injectable 40 milliGRAM(s) SubCutaneous daily  gabapentin 300 milliGRAM(s) Oral daily  PARoxetine 40 milliGRAM(s) Oral daily    MEDICATIONS  (PRN):  guaifenesin/dextromethorphan Oral Liquid 10 milliLiter(s) Oral every 6 hours PRN cough and/or congestion

## 2021-11-29 LAB
ALBUMIN SERPL ELPH-MCNC: 3.9 G/DL — SIGNIFICANT CHANGE UP (ref 3.5–5.2)
ALP SERPL-CCNC: 88 U/L — SIGNIFICANT CHANGE UP (ref 30–115)
ALT FLD-CCNC: 26 U/L — SIGNIFICANT CHANGE UP (ref 0–41)
ANION GAP SERPL CALC-SCNC: 15 MMOL/L — HIGH (ref 7–14)
AST SERPL-CCNC: 27 U/L — SIGNIFICANT CHANGE UP (ref 0–41)
BASOPHILS # BLD AUTO: 0.03 K/UL — SIGNIFICANT CHANGE UP (ref 0–0.2)
BASOPHILS NFR BLD AUTO: 0.5 % — SIGNIFICANT CHANGE UP (ref 0–1)
BILIRUB SERPL-MCNC: 0.7 MG/DL — SIGNIFICANT CHANGE UP (ref 0.2–1.2)
BUN SERPL-MCNC: 16 MG/DL — SIGNIFICANT CHANGE UP (ref 10–20)
CALCIUM SERPL-MCNC: 9.3 MG/DL — SIGNIFICANT CHANGE UP (ref 8.5–10.1)
CHLORIDE SERPL-SCNC: 104 MMOL/L — SIGNIFICANT CHANGE UP (ref 98–110)
CO2 SERPL-SCNC: 21 MMOL/L — SIGNIFICANT CHANGE UP (ref 17–32)
CREAT SERPL-MCNC: 0.6 MG/DL — LOW (ref 0.7–1.5)
EOSINOPHIL # BLD AUTO: 0.11 K/UL — SIGNIFICANT CHANGE UP (ref 0–0.7)
EOSINOPHIL NFR BLD AUTO: 1.9 % — SIGNIFICANT CHANGE UP (ref 0–8)
GLUCOSE SERPL-MCNC: 108 MG/DL — HIGH (ref 70–99)
HCT VFR BLD CALC: 41.9 % — SIGNIFICANT CHANGE UP (ref 37–47)
HGB BLD-MCNC: 13.6 G/DL — SIGNIFICANT CHANGE UP (ref 12–16)
IMM GRANULOCYTES NFR BLD AUTO: 0.2 % — SIGNIFICANT CHANGE UP (ref 0.1–0.3)
LYMPHOCYTES # BLD AUTO: 1.97 K/UL — SIGNIFICANT CHANGE UP (ref 1.2–3.4)
LYMPHOCYTES # BLD AUTO: 34.6 % — SIGNIFICANT CHANGE UP (ref 20.5–51.1)
MAGNESIUM SERPL-MCNC: 1.8 MG/DL — SIGNIFICANT CHANGE UP (ref 1.8–2.4)
MCHC RBC-ENTMCNC: 30.1 PG — SIGNIFICANT CHANGE UP (ref 27–31)
MCHC RBC-ENTMCNC: 32.5 G/DL — SIGNIFICANT CHANGE UP (ref 32–37)
MCV RBC AUTO: 92.7 FL — SIGNIFICANT CHANGE UP (ref 81–99)
MONOCYTES # BLD AUTO: 0.46 K/UL — SIGNIFICANT CHANGE UP (ref 0.1–0.6)
MONOCYTES NFR BLD AUTO: 8.1 % — SIGNIFICANT CHANGE UP (ref 1.7–9.3)
NEUTROPHILS # BLD AUTO: 3.11 K/UL — SIGNIFICANT CHANGE UP (ref 1.4–6.5)
NEUTROPHILS NFR BLD AUTO: 54.7 % — SIGNIFICANT CHANGE UP (ref 42.2–75.2)
NRBC # BLD: 0 /100 WBCS — SIGNIFICANT CHANGE UP (ref 0–0)
PLATELET # BLD AUTO: 245 K/UL — SIGNIFICANT CHANGE UP (ref 130–400)
POTASSIUM SERPL-MCNC: 4.5 MMOL/L — SIGNIFICANT CHANGE UP (ref 3.5–5)
POTASSIUM SERPL-SCNC: 4.5 MMOL/L — SIGNIFICANT CHANGE UP (ref 3.5–5)
PROT SERPL-MCNC: 6.5 G/DL — SIGNIFICANT CHANGE UP (ref 6–8)
RBC # BLD: 4.52 M/UL — SIGNIFICANT CHANGE UP (ref 4.2–5.4)
RBC # FLD: 13 % — SIGNIFICANT CHANGE UP (ref 11.5–14.5)
SODIUM SERPL-SCNC: 140 MMOL/L — SIGNIFICANT CHANGE UP (ref 135–146)
WBC # BLD: 5.69 K/UL — SIGNIFICANT CHANGE UP (ref 4.8–10.8)
WBC # FLD AUTO: 5.69 K/UL — SIGNIFICANT CHANGE UP (ref 4.8–10.8)

## 2021-11-29 PROCEDURE — 99232 SBSQ HOSP IP/OBS MODERATE 35: CPT

## 2021-11-29 RX ADMIN — ATORVASTATIN CALCIUM 20 MILLIGRAM(S): 80 TABLET, FILM COATED ORAL at 21:38

## 2021-11-29 RX ADMIN — Medication 40 MILLIGRAM(S): at 12:21

## 2021-11-29 RX ADMIN — GABAPENTIN 300 MILLIGRAM(S): 400 CAPSULE ORAL at 12:20

## 2021-11-29 RX ADMIN — ENOXAPARIN SODIUM 40 MILLIGRAM(S): 100 INJECTION SUBCUTANEOUS at 12:20

## 2021-11-29 NOTE — PROGRESS NOTE ADULT - SUBJECTIVE AND OBJECTIVE BOX
BEAU COBOS  70y  Female      Patient is a 70y old  Female who presents with a chief complaint of Constipation (28 Nov 2021 10:18)      INTERVAL HPI/OVERNIGHT EVENTS: no acute events overnight. no nursing concerns.       REVIEW OF SYSTEMS:  unable to accurately obtain    VITALS  T(C): 35.7 (11-29-21 @ 06:00), Max: 36.3 (11-28-21 @ 20:52)  HR: 67 (11-29-21 @ 06:00) (67 - 86)  BP: 133/63 (11-29-21 @ 06:00) (100/60 - 133/63)  RR: 20 (11-29-21 @ 06:00) (18 - 20)  SpO2: --  Wt(kg): --Vital Signs Last 24 Hrs  T(C): 35.7 (29 Nov 2021 06:00), Max: 36.3 (28 Nov 2021 20:52)  T(F): 96.2 (29 Nov 2021 06:00), Max: 97.3 (28 Nov 2021 20:52)  HR: 67 (29 Nov 2021 06:00) (67 - 86)  BP: 133/63 (29 Nov 2021 06:00) (100/60 - 133/63)  BP(mean): --  RR: 20 (29 Nov 2021 06:00) (18 - 20)  SpO2: --        PHYSICAL EXAM:  GENERAL: eldelry F, NAD, non toxic  EYES: anicteric sclera, non injected conjunctiva, EOMI  PSYCH: no agitation, baseline mentation  NERVOUS SYSTEM:  Alert & Oriented X1  PULMONARY: Clear to percussion bilaterally; No rales, rhonchi, wheezing, or rubs  CARDIOVASCULAR: Regular rate and rhythm; No murmurs, rubs, or gallops  GI: Soft, Nontender, Nondistended; Bowel sounds present  EXTREMITIES:  2+ Peripheral Pulses, No clubbing, cyanosis, or edema  LYMPH: No lymphadenopathy noted  SKIN: No rashes or lesions    Consultant(s) Notes Reviewed:  [x ] YES  [ ] NO    Discussed with Consultants/Other Providers [ x] YES     LABS                          13.6   5.69  )-----------( 245      ( 29 Nov 2021 06:22 )             41.9     11-29    140  |  104  |  16  ----------------------------<  108<H>  4.5   |  21  |  0.6<L>    Ca    9.3      29 Nov 2021 06:22  Mg     1.8     11-29    TPro  6.5  /  Alb  3.9  /  TBili  0.7  /  DBili  x   /  AST  27  /  ALT  26  /  AlkPhos  88  11-29    RADIOLOGY & ADDITIONAL TESTS:  - no images 11/29  Imaging Personally Reviewed:  [ ] YES  [ ] NO    HEALTH ISSUES - PROBLEM Dx:      MEDICATIONS  (STANDING):  atorvastatin 20 milliGRAM(s) Oral at bedtime  enoxaparin Injectable 40 milliGRAM(s) SubCutaneous daily  gabapentin 300 milliGRAM(s) Oral daily  PARoxetine 40 milliGRAM(s) Oral daily    MEDICATIONS  (PRN):  guaifenesin/dextromethorphan Oral Liquid 10 milliLiter(s) Oral every 6 hours PRN cough and/or congestion

## 2021-11-30 ENCOUNTER — TRANSCRIPTION ENCOUNTER (OUTPATIENT)
Age: 71
End: 2021-11-30

## 2021-11-30 VITALS
SYSTOLIC BLOOD PRESSURE: 147 MMHG | TEMPERATURE: 96 F | HEART RATE: 75 BPM | DIASTOLIC BLOOD PRESSURE: 70 MMHG | RESPIRATION RATE: 18 BRPM

## 2021-11-30 PROCEDURE — 99239 HOSP IP/OBS DSCHRG MGMT >30: CPT

## 2021-11-30 RX ADMIN — Medication 40 MILLIGRAM(S): at 11:31

## 2021-11-30 RX ADMIN — ENOXAPARIN SODIUM 40 MILLIGRAM(S): 100 INJECTION SUBCUTANEOUS at 11:32

## 2021-11-30 RX ADMIN — GABAPENTIN 300 MILLIGRAM(S): 400 CAPSULE ORAL at 11:31

## 2021-11-30 NOTE — PROGRESS NOTE ADULT - SUBJECTIVE AND OBJECTIVE BOX
Patient is a 70y old  Female who presents with a chief complaint of Constipation (29 Nov 2021 14:00)    SUBJECTIVE     INTERVAL HPI/OVERNIGHT EVENTS:    69 y/o female with PMHx of dementia (mostly nonverbal), CVA w/ Right sided residual hemiparesis, HTN, depression brought into the ED by sister for worsening constipation for 8 days prior to presentation. Per ED note, history is obtain by sister (Tasha Fields) who reports that despite use of OTC stool softeners, patient had a bowel movement for 8 days, and that it has been difficult for the sister to care for the patient at home. Per ED note, during presentation, pt denies any recent fevers, chills, SOB, abdominal pain, vomiting. At baseline patient is mostly bedbound, mostly nonverbal. VS in ED is noted for T 96, HR 65, /67. Labs notable for Lipase 94. UA +nitrates, LE, bacteria, WBC. CT ab/pelvis negative for acute pathology. Pt was started on Ceftriaxone in ED, pt completed 3d course for cystitis, as pt is non verbal, its difficult to obtain an ROS during hospital stay. Urine cultures-Ecoli. Speech/ Swallow eval completed and diet recommendations in place. Pending auth for Nursing Home placement. Obtaining ROS today was again limited by pt's non-verbal status. Pt appears to be in good appetite and appropriate mood.        OBJECTIVE    VITALS     T(C): 36.2 (11-30-21 @ 06:06), Max: 36.7 (11-29-21 @ 20:13)  HR: 65 (11-30-21 @ 06:06) (62 - 71)  BP: 127/65 (11-30-21 @ 06:06) (127/65 - 143/63)  RR: 18 (11-30-21 @ 06:06) (18 - 20)  SpO2: --  Wt(kg): --Vital Signs Last 24 Hrs  T(C): 36.2 (30 Nov 2021 06:06), Max: 36.7 (29 Nov 2021 20:13)  T(F): 97.2 (30 Nov 2021 06:06), Max: 98.1 (29 Nov 2021 20:13)  HR: 65 (30 Nov 2021 06:06) (62 - 71)  BP: 127/65 (30 Nov 2021 06:06) (127/65 - 143/63)  BP(mean): --  RR: 18 (30 Nov 2021 06:06) (18 - 20)  SpO2: --      Physical exam     - GENERAL: AAOx 2 (To person and place only). No acute distress. Elderly, thin, willing to eat, has a good appetite.       - EYES: Anicteric.    - HENT: Moist mucous membranes. No cervical lymphadenopathy.    - LUNGS: Clear to auscultation bilaterally. +Poor inspiratory effort.     - CARDIOVASCULAR: Regular rate and rhythm. No murmur. No JVD.      - ABDOMEN: Soft, non-tender and non-distended. Bowel sounds present on all 4 quadrants. No palpable masses.    - EXTREMITIES: Mild b/l LE edema. Non pitting. Non-tender.    - SKIN: No rashes or lesions. Warm.      - NEUROLOGIC: +Right sided residual hemiparesis.     - PSYCHIATRIC: Cooperative. Appropriate mood and affect.      LABS:                          13.6   5.69  )-----------( 245      ( 29 Nov 2021 06:22 )             41.9     11-29    140  |  104  |  16  ----------------------------<  108<H>  4.5   |  21  |  0.6<L>    Ca    9.3      29 Nov 2021 06:22  Mg     1.8     11-29    TPro  6.5  /  Alb  3.9  /  TBili  0.7  /  DBili  x   /  AST  27  /  ALT  26  /  AlkPhos  88  11-29     Patient is a 70y old  Female who presents with a chief complaint of Constipation (29 Nov 2021 14:00)    SUBJECTIVE     INTERVAL HPI/OVERNIGHT EVENTS:    71 y/o female with PMHx of dementia (mostly nonverbal), CVA w/ Right sided residual hemiparesis, HTN, depression brought into the ED by sister for worsening constipation for 8 days prior to presentation. Obtaining ROS today was again limited by pt's non-verbal status. Pt appears to be in good appetite and appropriate mood.        OBJECTIVE    VITALS     T(C): 36.2 (11-30-21 @ 06:06), Max: 36.7 (11-29-21 @ 20:13)  HR: 65 (11-30-21 @ 06:06) (62 - 71)  BP: 127/65 (11-30-21 @ 06:06) (127/65 - 143/63)  RR: 18 (11-30-21 @ 06:06) (18 - 20)  SpO2: --  Wt(kg): --Vital Signs Last 24 Hrs  T(C): 36.2 (30 Nov 2021 06:06), Max: 36.7 (29 Nov 2021 20:13)  T(F): 97.2 (30 Nov 2021 06:06), Max: 98.1 (29 Nov 2021 20:13)  HR: 65 (30 Nov 2021 06:06) (62 - 71)  BP: 127/65 (30 Nov 2021 06:06) (127/65 - 143/63)  BP(mean): --  RR: 18 (30 Nov 2021 06:06) (18 - 20)  SpO2: --      Physical exam     - GENERAL: AAOx 2 (To person and place only). No acute distress. Elderly, thin, willing to eat, has a good appetite.       - EYES: Anicteric.    - HENT: Moist mucous membranes. No cervical lymphadenopathy.    - LUNGS: Clear to auscultation bilaterally. +Poor inspiratory effort.     - CARDIOVASCULAR: Regular rate and rhythm. No murmur. No JVD.      - ABDOMEN: Soft, non-tender and non-distended. Bowel sounds present on all 4 quadrants. No palpable masses.    - EXTREMITIES: Mild b/l LE edema. Non pitting. Non-tender.    - SKIN: No rashes or lesions. Warm.      - NEUROLOGIC: +Right sided residual hemiparesis and neglect?    - PSYCHIATRIC: Cooperative. Appropriate mood and affect.      LABS:                          13.6   5.69  )-----------( 245      ( 29 Nov 2021 06:22 )             41.9     11-29    140  |  104  |  16  ----------------------------<  108<H>  4.5   |  21  |  0.6<L>    Ca    9.3      29 Nov 2021 06:22  Mg     1.8     11-29    TPro  6.5  /  Alb  3.9  /  TBili  0.7  /  DBili  x   /  AST  27  /  ALT  26  /  AlkPhos  88  11-29

## 2021-11-30 NOTE — DISCHARGE NOTE NURSING/CASE MANAGEMENT/SOCIAL WORK - NSDCPEFALRISK_GEN_ALL_CORE
For information on Fall & Injury Prevention, visit: [email or web address]  [email or web address] OR  https://www.cdc.gov/steadi/patient.html

## 2021-11-30 NOTE — PROGRESS NOTE ADULT - PROVIDER SPECIALTY LIST ADULT
Internal Medicine
Hospitalist
Hospitalist
Internal Medicine
Hospitalist
Internal Medicine
Hospitalist
Internal Medicine

## 2021-11-30 NOTE — DISCHARGE NOTE PROVIDER - HOSPITAL COURSE
History of Present Illness:   71 y/o female with PMHx of dementia ( mostly nonverbal), CVA w/ Right sided residual hemiparesis, HTN, depression brought into the ED by sister for worsening constipation. History is obtain by sister (peter Fields) who reports that patient has not had a BM in 8 days, despite use of OTC stool softners. She also reports that it has been difficult for her to care for her sister at home. She denies any recent fevers, chills, SOB, abdominal pain, vomiting. At baseline patient is mostly bedbound, mostly nonverbal.     In the ED, VS noted for T 96, HR 65, /67. Labs notable for Lipase 94. UA +nitrates, LE, bacteria, WBC. CT ab/pelv negative for acute pathology. s/p Rocephin in ED.       # Constipation, resolved  - per sister, no BMs x8d prior to admission  - no stool burden on CT A/P  - multiple large BMs during admission s/p miralax, senna    # Uncomplicated cystitis  - UA w/ nitrates, LE, WBC, bacteria  - UCx E.coli  - pt nonverbal so unable to determine if symptomatic  - s/p Rocephin x4d(discontinued 11/24)    # Dementia  # H/o CVA w/ residual R hemiparesis  # Depression  - per sister, unable to care for pt at home  - likely needs placement at SNF  - cont paroxetine, gabapentin, atorvastatin    # HTN  - BP: 140/80 (133/62 - 154/72)  - on no medications at home  - monitor for now    Vital Signs Last 24 Hrs  T(C): 35.6 (30 Nov 2021 12:29), Max: 36.7 (29 Nov 2021 20:13)  T(F): 96.1 (30 Nov 2021 12:29), Max: 98.1 (29 Nov 2021 20:13)  HR: 75 (30 Nov 2021 12:29) (65 - 75)  BP: 147/70 (30 Nov 2021 12:29) (127/65 - 147/70)  BP(mean): --  RR: 18 (30 Nov 2021 12:29) (18 - 18)  SpO2: --

## 2021-11-30 NOTE — PROGRESS NOTE ADULT - ASSESSMENT
69 y/o female with PMHx of dementia ( mostly nonverbal), CVA w/ Right sided residual hemiparesis, HTN, depression brought into the ED by sister for worsening constipation.     # Constipation, resolved  - per sister, no BMs x8d prior to admission  - no stool burden on CT A/P  - multiple large BMs during admission s/p miralax, senna    # Uncomplicated cystitis  - UA w/ nitrates, LE, WBC, bacteria  - UCx E.coli  - pt nonverbal so unable to determine if symptomatic  - s/p Rocephin x4d(discontinued 11/24)    # Dementia  # H/o CVA w/ residual R hemiparesis  # Depression  - per sister, unable to care for pt at home  - likely needs placement at SNF  - cont paroxetine, gabapentin, atorvastatin    # HTN  - BP: 140/80 (133/62 - 154/72)  - on no medications at home  - monitor for now    PT/REHAB: PT  ACTIVITY: Activity - Increase As Tolerated  DVT PPX: enoxaparin Injectable  GI PPX:  Not indicated  DIET: minced and moist DASH  CODE: Full code   Disposition: from home; to SNF  Pending: Per Mj of [facility], an out of network auth will be needed with [employer]. [facility] tasked for auth.   
69 y/o female with PMHx of dementia ( mostly nonverbal), CVA w/ Right sided residual hemiparesis, HTN, depression brought into the ED by sister for worsening constipation.     # Constipation, resolved  - per sister, no BMs x8d prior to admission  - no stool burden on CT A/P  - multiple large BMs during admission s/p miralax, senna    # Uncomplicated cystitis  - UA w/ nitrates, LE, WBC, bacteria  - UCx E.coli  - pt nonverbal so unable to determine if symptomatic  - s/p Rocephin x4d(discontinued 11/24)    # Dementia  # H/o CVA w/ residual R hemiparesis  # Depression  - per sister, unable to care for pt at home  - likely needs placement at SNF  - cont paroxetine, gabapentin, atorvastatin    # HTN  - BP: 140/80 (133/62 - 154/72)  - on no medications at home  - monitor for now    PT/REHAB: PT  ACTIVITY: Activity - Increase As Tolerated  DVT PPX: enoxaparin Injectable  GI PPX:  Not indicated  DIET: minced and moist DASH  CODE: Full code   Disposition: from home; to SNF  Pending: Per Mj of [facility], an out of network auth will be needed with [employer]. [facility] tasked for auth.   
69 y/o female with PMHx of dementia ( mostly nonverbal), CVA w/ Right sided residual hemiparesis, HTN, depression brought into the ED by sister for worsening constipation. History is obtain by sister (peter Fields) who reports that patient has not had a BM in 8 days, despite use of OTC stool softners. She also reports that it has been difficult for her to care for her sister at home.     #Constipation  - no stool burden on CT ab/pelv   - abdomen soft on physical exam   - c/w senna and miralax     #UTI, uncomplicated cystitis  - UA +nitrates, LE, WBC and bacteria  - s/p Rocephin in ED, continue with Rocephin  - f/u UCx    # Hx of dementia  # Hx of CVA s/p right residual hemiparesis   - unable to be cared for at home   - PT consult  -  for placement  - continue with Paxil and atorvastatin     #DVT PPx: Lovenox  #Gi PPx: not indicated  #Diet: thin liquids
71 y/o female with PMHx of dementia ( mostly nonverbal), CVA w/ Right sided residual hemiparesis, HTN, depression brought into the ED by sister for worsening constipation. Patient has had multiple large BMs over the course of the admission, hospital course complicated by uncomplicated cystitis treated with rocephin x4days.     # Dementia  # H/o CVA w/ residual R hemiparesis  # Depression  - per sister, unable to care for pt at home  - cont paroxetine, gabapentin, atorvastatin  - Pending SNF placement auth    # Constipation - resolved  - per sister, no BMs x8d prior to admission  - no stool burden on CT A/P  - multiple large BMs during admission s/p miralax, senna    # Uncomplicated cystitis -resolved   - UA w/ nitrates, LE, WBC, bacteria  - UCx E.coli  - pt nonverbal so unable to determine if symptomatic  - s/p Rocephin x4d (discontinued 11/24)    # h/o HTN, controlled off medications   - monitor VS    # DVT ppx: Lovenox   # GI ppx: not indicated  # Diet: Minced and moist - DASH   # Dispo: pending auth for SNF vs home. anticipate dc 24-48h   # Code status: full code         
69 y/o female with PMHx of dementia ( mostly nonverbal), CVA w/ Right sided residual hemiparesis, HTN, depression brought into the ED by sister for worsening constipation.     # Constipation, resolved  - per sister, no BMs x8d prior to admission  - no stool burden on CT A/P  - multiple large BMs during admission s/p miralax, senna    # Uncomplicated cystitis  - UA w/ nitrates, LE, WBC, bacteria  - UCx E.coli  - pt nonverbal so unable to determine if symptomatic  - s/p Rocephin x4d(discontinued 11/24)    # Dementia  # H/o CVA w/ residual R hemiparesis  # Depression  - per sister, unable to care for pt at home  - likely needs placement at SNF  - cont paroxetine, gabapentin, atorvastatin    # HTN  - BP: 140/80 (133/62 - 154/72)  - on no medications at home  - monitor for now    PT/REHAB: PT  ACTIVITY: Activity - Increase As Tolerated  DVT PPX: enoxaparin Injectable  GI PPX:  Not indicated  DIET: minced and moist DASH  CODE: Full code   Disposition: from home; to SNF  Pending: Per Mj of [facility], an out of network auth will be needed with [employer]. [facility] tasked for auth.   
69 y/o female with PMHx of dementia ( mostly nonverbal), CVA w/ Right sided residual hemiparesis, HTN, depression brought into the ED by sister for worsening constipation. Patient has had multiple large BMs over the course of the admission, hospital course complicated by uncomplicated cystitis treated with rocephin x4days.     # Dementia  # H/o CVA w/ residual R hemiparesis  # Depression  - per sister, unable to care for pt at home  - cont paroxetine, gabapentin, atorvastatin  - likely needs placement at SNF    # Constipation - resolved  - per sister, no BMs x8d prior to admission  - no stool burden on CT A/P  - multiple large BMs during admission s/p miralax, senna    # Uncomplicated cystitis  - UA w/ nitrates, LE, WBC, bacteria  - UCx E.coli  - pt nonverbal so unable to determine if symptomatic  - s/p Rocephin x4d (discontinued 11/24)    # h/o HTN, controlled off medications   - monitor VS    # DVT ppx: Lovenox   # GI ppx: not indicated  # Diet: Minced and moist - DASH   # Dispo: pending auth for SNF vs home. anticipate dc 24-48h   # Code status: full code   
ASSESSMENT & PLAN  69 y/o female with PMHx of dementia ( mostly nonverbal), CVA w/ Right sided residual hemiparesis, HTN, depression brought into the ED by sister for worsening constipation.     # Constipation, resolved  - per sister, no BMs x8d prior to admission  - no stool burden on CT A/P  - multiple large BMs during admission s/p miralax, senna    # Uncomplicated cystitis  - UA w/ nitrates, LE, WBC, bacteria  - UCx E.coli  - pt nonverbal so unable to determine if symptomatic  - s/p Rocephin x4d    # Dementia  # H/o CVA w/ residual R hemiparesis  # Depression  - per sister, unable to care for pt at home  - likely needs placement at SNF  - cont paroxetine, gabapentin, atorvastatin    # HTN  - BP: 140/80 (133/62 - 154/72)  - on no medications at home  - monitor for now    PT/REHAB: PT  ACTIVITY: Activity - Increase As Tolerated  DVT PPX: enoxaparin Injectable  GI PPX:  Not indicated  DIET: minced and moist DASH  CODE: Full code   Disposition: from home; to SNF  Pending: placement  
69 y/o female with PMHx of dementia ( mostly nonverbal), CVA w/ Right sided residual hemiparesis, HTN, depression brought into the ED by sister for worsening constipation.     # Constipation, resolved  - per sister, no BMs x8d prior to admission  - no stool burden on CT A/P  - multiple large BMs during admission s/p miralax, senna    # Uncomplicated cystitis  - UA w/ nitrates, LE, WBC, bacteria  - UCx E.coli  - pt nonverbal so unable to determine if symptomatic  - s/p Rocephin x4d(discontinued 11/24)    # Dementia  # H/o CVA w/ residual R hemiparesis  # Depression  - per sister, unable to care for pt at home  - likely needs placement at SNF  - cont paroxetine, gabapentin, atorvastatin    # HTN  - BP: 140/80 (133/62 - 154/72)  - on no medications at home  - monitor for now    PT/REHAB: PT  ACTIVITY: Activity - Increase As Tolerated  DVT PPX: enoxaparin Injectable  GI PPX:  Not indicated  DIET: minced and moist DASH  CODE: Full code   Disposition: from home; to SNF  Pending: Per Mj of [facility], an out of network auth will be needed with [employer]. [facility] tasked for auth.   
69 y/o female with PMHx of dementia ( mostly nonverbal), CVA w/ Right sided residual hemiparesis, HTN, depression brought into the ED by sister for worsening constipation.     # Constipation, resolved  - per sister, no BMs x8d prior to admission  - no stool burden on CT A/P  - multiple large BMs during admission s/p miralax, senna    # Uncomplicated cystitis  - UA w/ nitrates, LE, WBC, bacteria  - UCx E.coli  - pt nonverbal so unable to determine if symptomatic  - s/p Rocephin x4d(discontinued 11/24)    # Dementia  # H/o CVA w/ residual R hemiparesis  # Depression  - per sister, unable to care for pt at home  - likely needs placement at SNF  - cont paroxetine, gabapentin, atorvastatin    # HTN  - BP: 140/80 (133/62 - 154/72)  - on no medications at home  - monitor for now    PT/REHAB: PT  ACTIVITY: Activity - Increase As Tolerated  DVT PPX: enoxaparin Injectable  GI PPX:  Not indicated  DIET: minced and moist DASH  CODE: Full code   Disposition: from home; to SNF  Pending: Per Mj of [facility], an out of network auth will be needed with [employer]. [facility] tasked for auth. 
71 y/o female with PMHx of dementia ( mostly nonverbal), CVA w/ Right sided residual hemiparesis, HTN, depression brought into the ED by sister for worsening constipation.     # Constipation, resolved  - per sister, no BMs x8d prior to admission  - no stool burden on CT A/P  - multiple large BMs during admission s/p miralax, senna    # Uncomplicated cystitis  - UA w/ nitrates, LE, WBC, bacteria  - UCx E.coli  - pt nonverbal so unable to determine if symptomatic  - s/p Rocephin x4d(discontinued 11/24)    # Dementia  # H/o CVA w/ residual R hemiparesis  # Depression  - per sister, unable to care for pt at home  - likely needs placement at SNF  - cont paroxetine, gabapentin, atorvastatin    # HTN  - BP: 140/80 (133/62 - 154/72)  - on no medications at home  - monitor for now    PT/REHAB: PT  ACTIVITY: Activity - Increase As Tolerated  DVT PPX: enoxaparin Injectable  GI PPX:  Not indicated  DIET: minced and moist DASH  CODE: Full code   Disposition: from home; to SNF  Pending: Per M[facility] of [facility], an out of network auth will be needed with [employer]. [facility] tasked for auth.

## 2021-11-30 NOTE — PROGRESS NOTE ADULT - SUBJECTIVE AND OBJECTIVE BOX
BEAU COBOS  70y  Female      Patient is a 70y old  Female who presents with a chief complaint of Constipation (30 Nov 2021 10:47)      INTERVAL HPI/OVERNIGHT EVENTS: no acute events overnight. sister at bedside.       REVIEW OF SYSTEMS:  CONSTITUTIONAL: No fever, weight loss, or fatigue  RESPIRATORY: No cough, wheezing, chills or hemoptysis; No shortness of breath  CARDIOVASCULAR: No chest pain, palpitations, dizziness, or leg swelling  GASTROINTESTINAL: No abdominal or epigastric pain. No nausea, vomiting, or hematemesis; No diarrhea or constipation. No melena or hematochezia.  GENITOURINARY: No dysuria, frequency, hematuria, or incontinence  NEUROLOGICAL: No headaches, memory loss, loss of strength, numbness, or tremors  SKIN: No itching, burning, rashes, or lesions   MUSCULOSKELETAL: No joint pain or swelling; No muscle, back, or extremity pain  PSYCHIATRIC: No depression, anxiety, mood swings, or difficulty sleeping  All other review of systems negative    T(C): 36.2 (11-30-21 @ 06:06), Max: 36.7 (11-29-21 @ 20:13)  HR: 65 (11-30-21 @ 06:06) (62 - 71)  BP: 127/65 (11-30-21 @ 06:06) (127/65 - 143/63)  RR: 18 (11-30-21 @ 06:06) (18 - 20)  SpO2: --  Wt(kg): --Vital Signs Last 24 Hrs  T(C): 36.2 (30 Nov 2021 06:06), Max: 36.7 (29 Nov 2021 20:13)  T(F): 97.2 (30 Nov 2021 06:06), Max: 98.1 (29 Nov 2021 20:13)  HR: 65 (30 Nov 2021 06:06) (62 - 71)  BP: 127/65 (30 Nov 2021 06:06) (127/65 - 143/63)  BP(mean): --  RR: 18 (30 Nov 2021 06:06) (18 - 20)  SpO2: --      11-29-21 @ 07:01  -  11-30-21 @ 07:00  --------------------------------------------------------  IN: 412 mL / OUT: 0 mL / NET: 412 mL    11-30-21 @ 07:01  -  11-30-21 @ 12:23  --------------------------------------------------------  IN: 240 mL / OUT: 0 mL / NET: 240 mL        PHYSICAL EXAM:  GENERAL: NAD, well-groomed, well-developed  PSYCH: no agitation, baseline mentation  NERVOUS SYSTEM:  Alert & Oriented X3, Motor Strength 5/5 B/L upper and lower extremities; Sensory intact; FTN WNL  PULMONARY: Clear to percussion bilaterally; No rales, rhonchi, wheezing, or rubs  CARDIOVASCULAR: Regular rate and rhythm; No murmurs, rubs, or gallops  GI: Soft, Nontender, Nondistended; Bowel sounds present  EXTREMITIES:  2+ Peripheral Pulses, No clubbing, cyanosis, or edema  LYMPH: No lymphadenopathy noted  SKIN: No rashes or lesions    Consultant(s) Notes Reviewed:  [x ] YES  [ ] NO    Discussed with Consultants/Other Providers [ x] YES     LABS                          13.6   5.69  )-----------( 245      ( 29 Nov 2021 06:22 )             41.9     11-29    140  |  104  |  16  ----------------------------<  108<H>  4.5   |  21  |  0.6<L>    Ca    9.3      29 Nov 2021 06:22  Mg     1.8     11-29    TPro  6.5  /  Alb  3.9  /  TBili  0.7  /  DBili  x   /  AST  27  /  ALT  26  /  AlkPhos  88  11-29          Lactate Trend        CAPILLARY BLOOD GLUCOSE            RADIOLOGY & ADDITIONAL TESTS:    Imaging Personally Reviewed:  [ ] YES  [ ] NO    HEALTH ISSUES - PROBLEM Dx:         BEAU COBOS  70y  Female      Patient is a 70y old  Female who presents with a chief complaint of Constipation (30 Nov 2021 10:47)      INTERVAL HPI/OVERNIGHT EVENTS: no acute events overnight. sister at bedside.       REVIEW OF SYSTEMS:  unable to accurately obtain    VITALS  T(C): 36.2 (11-30-21 @ 06:06), Max: 36.7 (11-29-21 @ 20:13)  HR: 65 (11-30-21 @ 06:06) (62 - 71)  BP: 127/65 (11-30-21 @ 06:06) (127/65 - 143/63)  RR: 18 (11-30-21 @ 06:06) (18 - 20)  SpO2: --  Wt(kg): --Vital Signs Last 24 Hrs  T(C): 36.2 (30 Nov 2021 06:06), Max: 36.7 (29 Nov 2021 20:13)  T(F): 97.2 (30 Nov 2021 06:06), Max: 98.1 (29 Nov 2021 20:13)  HR: 65 (30 Nov 2021 06:06) (62 - 71)  BP: 127/65 (30 Nov 2021 06:06) (127/65 - 143/63)  BP(mean): --  RR: 18 (30 Nov 2021 06:06) (18 - 20)  SpO2: --      11-29-21 @ 07:01  -  11-30-21 @ 07:00  --------------------------------------------------------  IN: 412 mL / OUT: 0 mL / NET: 412 mL    11-30-21 @ 07:01  -  11-30-21 @ 12:23  --------------------------------------------------------  IN: 240 mL / OUT: 0 mL / NET: 240 mL        PHYSICAL EXAM:  GENERAL: eldelry F, NAD, non toxic  EYES: anicteric sclera, non injected conjunctiva, EOMI  PSYCH: no agitation, baseline mentation  NERVOUS SYSTEM:  Alert & Oriented X1  PULMONARY: Clear to percussion bilaterally; No rales, rhonchi, wheezing, or rubs  CARDIOVASCULAR: Regular rate and rhythm; No murmurs, rubs, or gallops  GI: Soft, Nontender, Nondistended; Bowel sounds present  EXTREMITIES:  2+ Peripheral Pulses, No clubbing, cyanosis, or edema  LYMPH: No lymphadenopathy noted  SKIN: No rashes or lesions  Consultant(s) Notes Reviewed:  [x ] YES  [ ] NO    Discussed with Consultants/Other Providers [ x] YES     LABS                          13.6   5.69  )-----------( 245      ( 29 Nov 2021 06:22 )             41.9     11-29    140  |  104  |  16  ----------------------------<  108<H>  4.5   |  21  |  0.6<L>    Ca    9.3      29 Nov 2021 06:22  Mg     1.8     11-29    TPro  6.5  /  Alb  3.9  /  TBili  0.7  /  DBili  x   /  AST  27  /  ALT  26  /  AlkPhos  88  11-29      RADIOLOGY & ADDITIONAL TESTS:    Imaging Personally Reviewed:  [ ] YES  [ ] NO    HEALTH ISSUES - PROBLEM Dx:      MEDICATIONS  (STANDING):  atorvastatin 20 milliGRAM(s) Oral at bedtime  enoxaparin Injectable 40 milliGRAM(s) SubCutaneous daily  gabapentin 300 milliGRAM(s) Oral daily  PARoxetine 40 milliGRAM(s) Oral daily    MEDICATIONS  (PRN):  guaifenesin/dextromethorphan Oral Liquid 10 milliLiter(s) Oral every 6 hours PRN cough and/or congestion

## 2021-11-30 NOTE — DISCHARGE NOTE PROVIDER - NSDCQMSTROKE_NEU_ALL_CORE
Occupational Therapy      Patient Name:  Soy Reza   MRN:  [id number]    Patient not seen today secondary to Failed Move screen. Will follow up as appropriate     Cornell Nicolas OT  12/20/2017   No

## 2021-11-30 NOTE — DISCHARGE NOTE PROVIDER - NSDCMRMEDTOKEN_GEN_ALL_CORE_FT
atorvastatin 20 mg oral tablet: 1 tab(s) orally once a day  Benadryl 50 mg oral capsule: 1 cap(s) orally once (at bedtime)  gabapentin 300 mg oral capsule: 1 cap(s) orally once a day  PARoxetine 40 mg oral tablet: 1 tab(s) orally once a day

## 2021-11-30 NOTE — DISCHARGE NOTE NURSING/CASE MANAGEMENT/SOCIAL WORK - PATIENT PORTAL LINK FT
You can access the FollowMyHealth Patient Portal offered by [facility] by registering at the following website: [email or web address]. By joining [employer]’s FollowMyHealth portal, you will also be able to view your health information using other applications (apps) compatible with our system.

## 2021-11-30 NOTE — DISCHARGE NOTE PROVIDER - NSDCCPCAREPLAN_GEN_ALL_CORE_FT
PRINCIPAL DISCHARGE DIAGNOSIS  Diagnosis: Acute UTI  Assessment and Plan of Treatment: treated with course of antibiotics      SECONDARY DISCHARGE DIAGNOSES  Diagnosis: Social problem  Assessment and Plan of Treatment:     Diagnosis: Constipation  Assessment and Plan of Treatment: provided bowel regimen and had multiple bowel movements

## 2021-12-09 DIAGNOSIS — F32.A DEPRESSION, UNSPECIFIED: ICD-10-CM

## 2021-12-09 DIAGNOSIS — F03.90 UNSPECIFIED DEMENTIA WITHOUT BEHAVIORAL DISTURBANCE: ICD-10-CM

## 2021-12-09 DIAGNOSIS — R10.9 UNSPECIFIED ABDOMINAL PAIN: ICD-10-CM

## 2021-12-09 DIAGNOSIS — I10 ESSENTIAL (PRIMARY) HYPERTENSION: ICD-10-CM

## 2021-12-09 DIAGNOSIS — K59.00 CONSTIPATION, UNSPECIFIED: ICD-10-CM

## 2021-12-09 DIAGNOSIS — N30.90 CYSTITIS, UNSPECIFIED WITHOUT HEMATURIA: ICD-10-CM

## 2021-12-09 DIAGNOSIS — I69.351 HEMIPLEGIA AND HEMIPARESIS FOLLOWING CEREBRAL INFARCTION AFFECTING RIGHT DOMINANT SIDE: ICD-10-CM

## 2021-12-09 DIAGNOSIS — E78.5 HYPERLIPIDEMIA, UNSPECIFIED: ICD-10-CM

## 2021-12-09 DIAGNOSIS — B96.20 UNSPECIFIED ESCHERICHIA COLI [E. COLI] AS THE CAUSE OF DISEASES CLASSIFIED ELSEWHERE: ICD-10-CM

## 2021-12-26 NOTE — H&P ADULT - NSHPPOADEEPVENOUSTHROMB_GEN_A_CORE
15.8   26.45 )-----------( 263      ( 26 Dec 2021 15:46 )             47.4       12-26    134<L>  |  99  |  18  ----------------------------<  209<H>  4.0   |  21<L>  |  1.01    Ca    10.4      26 Dec 2021 15:46  Mg     1.6     12-26    TPro  7.8  /  Alb  4.4  /  TBili  0.7  /  DBili  x   /  AST  28  /  ALT  51<H>  /  AlkPhos  79  12-26                      Lactate Trend            CAPILLARY BLOOD GLUCOSE      POCT Blood Glucose.: 135 mg/dL (26 Dec 2021 23:12) no Personally reviewed imaging, labs, ekg.    15.8   26.45 )-----------( 263      ( 26 Dec 2021 15:46 )             47.4       12-26    134<L>  |  99  |  18  ----------------------------<  209<H>  4.0   |  21<L>  |  1.01    Ca    10.4      26 Dec 2021 15:46  Mg     1.6     12-26    TPro  7.8  /  Alb  4.4  /  TBili  0.7  /  DBili  x   /  AST  28  /  ALT  51<H>  /  AlkPhos  79  12-26                      Lactate Trend            CAPILLARY BLOOD GLUCOSE      POCT Blood Glucose.: 135 mg/dL (26 Dec 2021 23:12)

## 2022-01-18 ENCOUNTER — EMERGENCY (EMERGENCY)
Facility: HOSPITAL | Age: 72
LOS: 0 days | Discharge: NURSING, REHAB OR LONG-TERM CARE | End: 2022-01-19
Attending: EMERGENCY MEDICINE | Admitting: EMERGENCY MEDICINE
Payer: MEDICARE

## 2022-01-18 VITALS
RESPIRATION RATE: 18 BRPM | TEMPERATURE: 97 F | DIASTOLIC BLOOD PRESSURE: 66 MMHG | HEART RATE: 75 BPM | SYSTOLIC BLOOD PRESSURE: 136 MMHG | HEIGHT: 66 IN | OXYGEN SATURATION: 95 %

## 2022-01-18 DIAGNOSIS — S09.90XA UNSPECIFIED INJURY OF HEAD, INITIAL ENCOUNTER: ICD-10-CM

## 2022-01-18 DIAGNOSIS — F03.90 UNSPECIFIED DEMENTIA WITHOUT BEHAVIORAL DISTURBANCE: ICD-10-CM

## 2022-01-18 DIAGNOSIS — Z04.3 ENCOUNTER FOR EXAMINATION AND OBSERVATION FOLLOWING OTHER ACCIDENT: ICD-10-CM

## 2022-01-18 DIAGNOSIS — S00.93XA CONTUSION OF UNSPECIFIED PART OF HEAD, INITIAL ENCOUNTER: ICD-10-CM
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
## 2022-01-18 DIAGNOSIS — I10 ESSENTIAL (PRIMARY) HYPERTENSION: ICD-10-CM
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
## 2022-01-18 DIAGNOSIS — I69.351 HEMIPLEGIA AND HEMIPARESIS FOLLOWING CEREBRAL INFARCTION AFFECTING RIGHT DOMINANT SIDE: ICD-10-CM

## 2022-01-18 PROCEDURE — 99284 EMERGENCY DEPT VISIT MOD MDM: CPT | Mod: FS

## 2022-01-18 PROCEDURE — 70450 CT HEAD/BRAIN W/O DYE: CPT | Mod: 26,MA

## 2022-01-18 PROCEDURE — 72125 CT NECK SPINE W/O DYE: CPT | Mod: 26,MA

## 2022-01-18 RX ORDER — DIPHENHYDRAMINE HCL 50 MG
1 CAPSULE ORAL
Qty: 0 | Refills: 0 | DISCHARGE

## 2022-01-18 RX ORDER — LANOLIN ALCOHOL/MO/W.PET/CERES
2 CREAM (GRAM) TOPICAL
Qty: 0 | Refills: 0 | DISCHARGE

## 2022-01-18 RX ORDER — VORTIOXETINE 5 MG/1
1 TABLET, FILM COATED ORAL
Qty: 0 | Refills: 0 | DISCHARGE

## 2022-01-18 RX ORDER — LEVOTHYROXINE SODIUM 125 MCG
1 TABLET ORAL
Qty: 0 | Refills: 0 | DISCHARGE

## 2022-01-18 RX ORDER — GABAPENTIN 400 MG/1
1 CAPSULE ORAL
Qty: 0 | Refills: 0 | DISCHARGE

## 2022-01-18 NOTE — ED PROVIDER NOTE - PROGRESS NOTE DETAILS
ATTENDING NOTE:   70 y/o F with PMH of dementia, HTN, CVA presents from NH s/p unwitnessed fall. Unknown LOC, (+) hematoma on R side. As per NH pt was found down, no other pain elsewhere.   Well appearing, NAD, non toxic. NCAT, (+) R sided hematoma, PERRLA, EOMI. Neck supple, non-tender. Normal wob, CTA b/l, rrr, abdomen s nt nd no rebound or guarding. WWPx4 neuro nonfocal.  Plan for CT and DC.

## 2022-01-18 NOTE — ED PROVIDER NOTE - OBJECTIVE STATEMENT
70 y/o female with hx of dementia, CVA right hemiparesis presents to the ED sp fall out of wheelchair at nursing home today with sts to scalp. patient poor historian . patient without any lacerations, bruising to extremities. no vomiting or diarrhea. no fevers or coughing

## 2022-01-18 NOTE — ED ADULT NURSE NOTE - NSICDXPASTMEDICALHX_GEN_ALL_CORE_FT
PAST MEDICAL HISTORY:  Age-related osteoporosis without current pathological fracture     Ataxic gait     CVA (cerebral vascular accident)     Depression, major, single episode, in partial remission     Encounter for screening for other viral diseases     Hemiplegia and hemiparesis following cerebral infarction affecting right dominant side     HTN (hypertension)     Hyperlipemia     Hypothyroidism     Major depressive disorder, single episode, moderate     Mononeuropathy, unspecified     Muscle weakness     Pain, unspecified     Peripheral vascular disease     Vascular dementia with behavior disturbance

## 2022-01-18 NOTE — ED PROVIDER NOTE - CLINICAL SUMMARY MEDICAL DECISION MAKING FREE TEXT BOX
71yF dementia  cva right hemiparesis in wheelchair from [facility] presents  after being found on floor with hematoma- in ED  CT head  negative  CT c spine There is mild rotatory subluxation at the C1-C2 articulation, seen best   	on axial images. pt 5/5 strength  bl UE   Dw  nursing home supervisor  Ximena -  pt is chronically  with  head  turned to  right side -  Natanael neurosurgery -  given patient head  always  turned to right -this is not acute - no  neurosurgical intervention - no need for  c collar -     pt dcd back to NH

## 2022-01-18 NOTE — ED ADULT NURSE NOTE - NSIMPLEMENTINTERV_GEN_ALL_ED
Implemented All Fall Risk Interventions:  [place] to call system. Call bell, personal items and telephone within reach. Instruct patient to call for assistance. Room bathroom lighting operational. Non-slip footwear when patient is off stretcher. Physically safe environment: no spills, clutter or unnecessary equipment. Stretcher in lowest position, wheels locked, appropriate side rails in place. Provide visual cue, wrist band, yellow gown, etc. Monitor gait and stability. Monitor for mental status changes and reorient to person, place, and time. Review medications for side effects contributing to fall risk. Reinforce activity limits and safety measures with patient and family.

## 2022-01-18 NOTE — ED ADULT TRIAGE NOTE - CHIEF COMPLAINT QUOTE
BIBA ems states "from nursing home s/p fall hematoma to head history of dementia unable to tell us exactly what happen".

## 2022-01-18 NOTE — ED PROVIDER NOTE - PATIENT PORTAL LINK FT
You can access the FollowMyHealth Patient Portal offered by [facility] by registering at the following website: [email or web address]. By joining [employer]’s FollowMyHealth portal, you will also be able to view your health information using other applications (apps) compatible with our system.

## 2022-01-18 NOTE — ED PROVIDER NOTE - PHYSICAL EXAMINATION
Vital Signs: I have reviewed the initial vital signs.  Constitutional: well-nourished, no acute distress, normocephalic  Eyes: PERRLA, EOMI, , clear conjunctiva  ENT: MMM,  Cardiovascular: regular rate, regular rhythm, no murmur appreciated  Respiratory: unlabored respiratory effort, clear to auscultation bilaterally, no chest wall tenderness  Gastrointestinal: soft, non-tender, non-distended  abdomen, no pulsatile mass  Musculoskeletal: supple neck, no vertebral step off , no lower extremity edema, no bony tenderness  Integumentary: warm, dry, no rash  Neurologic: awake, alert,  extremities’ motor and sensory functions grossly intact, right hemiparesis, GCS 15

## 2022-01-19 VITALS
SYSTOLIC BLOOD PRESSURE: 128 MMHG | RESPIRATION RATE: 18 BRPM | DIASTOLIC BLOOD PRESSURE: 58 MMHG | TEMPERATURE: 98 F | OXYGEN SATURATION: 98 % | HEART RATE: 70 BPM

## 2022-02-24 ENCOUNTER — EMERGENCY (EMERGENCY)
Facility: HOSPITAL | Age: 72
LOS: 0 days | Discharge: HOME | End: 2022-02-25
Attending: EMERGENCY MEDICINE | Admitting: EMERGENCY MEDICINE
Payer: MEDICARE

## 2022-02-24 VITALS
SYSTOLIC BLOOD PRESSURE: 127 MMHG | TEMPERATURE: 99 F | RESPIRATION RATE: 20 BRPM | DIASTOLIC BLOOD PRESSURE: 58 MMHG | WEIGHT: 119.93 LBS | HEIGHT: 66 IN | HEART RATE: 116 BPM | OXYGEN SATURATION: 94 %

## 2022-02-24 DIAGNOSIS — F03.90 UNSPECIFIED DEMENTIA WITHOUT BEHAVIORAL DISTURBANCE: ICD-10-CM

## 2022-02-24 DIAGNOSIS — Z04.3 ENCOUNTER FOR EXAMINATION AND OBSERVATION FOLLOWING OTHER ACCIDENT: ICD-10-CM

## 2022-02-24 DIAGNOSIS — Z86.73 PERSONAL HISTORY OF TRANSIENT ISCHEMIC ATTACK (TIA), AND CEREBRAL INFARCTION WITHOUT RESIDUAL DEFICITS: ICD-10-CM

## 2022-02-24 DIAGNOSIS — I10 ESSENTIAL (PRIMARY) HYPERTENSION: ICD-10-CM

## 2022-02-24 DIAGNOSIS — E03.9 HYPOTHYROIDISM, UNSPECIFIED: ICD-10-CM

## 2022-02-24 DIAGNOSIS — E78.5 HYPERLIPIDEMIA, UNSPECIFIED: ICD-10-CM

## 2022-02-24 PROBLEM — Z11.59 ENCOUNTER FOR SCREENING FOR OTHER VIRAL DISEASES: Chronic | Status: ACTIVE | Noted: 2022-01-18

## 2022-02-24 PROBLEM — F32.4 MAJOR DEPRESSIVE DISORDER, SINGLE EPISODE, IN PARTIAL REMISSION: Chronic | Status: ACTIVE | Noted: 2022-01-18

## 2022-02-24 PROBLEM — R52 PAIN, UNSPECIFIED: Chronic | Status: ACTIVE | Noted: 2022-01-18

## 2022-02-24 PROBLEM — F01.51 VASCULAR DEMENTIA, UNSPECIFIED SEVERITY, WITH BEHAVIORAL DISTURBANCE: Chronic | Status: ACTIVE | Noted: 2022-01-18

## 2022-02-24 PROBLEM — F32.1 MAJOR DEPRESSIVE DISORDER, SINGLE EPISODE, MODERATE: Chronic | Status: ACTIVE | Noted: 2022-01-18

## 2022-02-24 PROBLEM — M62.81 MUSCLE WEAKNESS (GENERALIZED): Chronic | Status: ACTIVE | Noted: 2022-01-18

## 2022-02-24 PROBLEM — G58.9 MONONEUROPATHY, UNSPECIFIED: Chronic | Status: ACTIVE | Noted: 2022-01-18

## 2022-02-24 PROBLEM — M81.0 AGE-RELATED OSTEOPOROSIS WITHOUT CURRENT PATHOLOGICAL FRACTURE: Chronic | Status: ACTIVE | Noted: 2022-01-18

## 2022-02-24 PROBLEM — I73.9 PERIPHERAL VASCULAR DISEASE, UNSPECIFIED: Chronic | Status: ACTIVE | Noted: 2022-01-18

## 2022-02-24 PROBLEM — I69.351 HEMIPLEGIA AND HEMIPARESIS FOLLOWING CEREBRAL INFARCTION AFFECTING RIGHT DOMINANT SIDE: Chronic | Status: ACTIVE | Noted: 2022-01-18

## 2022-02-24 PROBLEM — R26.0 ATAXIC GAIT: Chronic | Status: ACTIVE | Noted: 2022-01-18

## 2022-02-24 LAB
ALBUMIN SERPL ELPH-MCNC: 3.7 G/DL — SIGNIFICANT CHANGE UP (ref 3.5–5.2)
ALP SERPL-CCNC: 92 U/L — SIGNIFICANT CHANGE UP (ref 30–115)
ALT FLD-CCNC: 10 U/L — SIGNIFICANT CHANGE UP (ref 0–41)
ANION GAP SERPL CALC-SCNC: 10 MMOL/L — SIGNIFICANT CHANGE UP (ref 7–14)
AST SERPL-CCNC: 15 U/L — SIGNIFICANT CHANGE UP (ref 0–41)
BASOPHILS # BLD AUTO: 0 K/UL — SIGNIFICANT CHANGE UP (ref 0–0.2)
BASOPHILS NFR BLD AUTO: 0 % — SIGNIFICANT CHANGE UP (ref 0–1)
BILIRUB SERPL-MCNC: 0.8 MG/DL — SIGNIFICANT CHANGE UP (ref 0.2–1.2)
BUN SERPL-MCNC: 15 MG/DL — SIGNIFICANT CHANGE UP (ref 10–20)
CALCIUM SERPL-MCNC: 9.3 MG/DL — SIGNIFICANT CHANGE UP (ref 8.5–10.1)
CHLORIDE SERPL-SCNC: 101 MMOL/L — SIGNIFICANT CHANGE UP (ref 98–110)
CO2 SERPL-SCNC: 27 MMOL/L — SIGNIFICANT CHANGE UP (ref 17–32)
CREAT SERPL-MCNC: 0.8 MG/DL — SIGNIFICANT CHANGE UP (ref 0.7–1.5)
EOSINOPHIL # BLD AUTO: 0 K/UL — SIGNIFICANT CHANGE UP (ref 0–0.7)
EOSINOPHIL NFR BLD AUTO: 0 % — SIGNIFICANT CHANGE UP (ref 0–8)
GIANT PLATELETS BLD QL SMEAR: PRESENT — SIGNIFICANT CHANGE UP
GLUCOSE SERPL-MCNC: 148 MG/DL — HIGH (ref 70–99)
HCT VFR BLD CALC: 39.5 % — SIGNIFICANT CHANGE UP (ref 37–47)
HGB BLD-MCNC: 12.6 G/DL — SIGNIFICANT CHANGE UP (ref 12–16)
LIDOCAIN IGE QN: 18 U/L — SIGNIFICANT CHANGE UP (ref 7–60)
LYMPHOCYTES # BLD AUTO: 0.35 K/UL — LOW (ref 1.2–3.4)
LYMPHOCYTES # BLD AUTO: 2.6 % — LOW (ref 20.5–51.1)
MANUAL SMEAR VERIFICATION: SIGNIFICANT CHANGE UP
MCHC RBC-ENTMCNC: 29.3 PG — SIGNIFICANT CHANGE UP (ref 27–31)
MCHC RBC-ENTMCNC: 31.9 G/DL — LOW (ref 32–37)
MCV RBC AUTO: 91.9 FL — SIGNIFICANT CHANGE UP (ref 81–99)
MONOCYTES # BLD AUTO: 0.58 K/UL — SIGNIFICANT CHANGE UP (ref 0.1–0.6)
MONOCYTES NFR BLD AUTO: 4.4 % — SIGNIFICANT CHANGE UP (ref 1.7–9.3)
NEUTROPHILS # BLD AUTO: 12.13 K/UL — HIGH (ref 1.4–6.5)
NEUTROPHILS NFR BLD AUTO: 90.4 % — HIGH (ref 42.2–75.2)
NEUTS BAND # BLD: 0.9 % — SIGNIFICANT CHANGE UP (ref 0–6)
PLAT MORPH BLD: NORMAL — SIGNIFICANT CHANGE UP
PLATELET # BLD AUTO: 220 K/UL — SIGNIFICANT CHANGE UP (ref 130–400)
POTASSIUM SERPL-MCNC: 4.1 MMOL/L — SIGNIFICANT CHANGE UP (ref 3.5–5)
POTASSIUM SERPL-SCNC: 4.1 MMOL/L — SIGNIFICANT CHANGE UP (ref 3.5–5)
PROT SERPL-MCNC: 6.3 G/DL — SIGNIFICANT CHANGE UP (ref 6–8)
RBC # BLD: 4.3 M/UL — SIGNIFICANT CHANGE UP (ref 4.2–5.4)
RBC # FLD: 13.1 % — SIGNIFICANT CHANGE UP (ref 11.5–14.5)
RBC BLD AUTO: NORMAL — SIGNIFICANT CHANGE UP
SODIUM SERPL-SCNC: 138 MMOL/L — SIGNIFICANT CHANGE UP (ref 135–146)
VARIANT LYMPHS # BLD: 1.7 % — SIGNIFICANT CHANGE UP (ref 0–5)
WBC # BLD: 13.29 K/UL — HIGH (ref 4.8–10.8)
WBC # FLD AUTO: 13.29 K/UL — HIGH (ref 4.8–10.8)

## 2022-02-24 PROCEDURE — 70450 CT HEAD/BRAIN W/O DYE: CPT | Mod: 26,MA

## 2022-02-24 PROCEDURE — 71045 X-RAY EXAM CHEST 1 VIEW: CPT | Mod: 26

## 2022-02-24 PROCEDURE — 99284 EMERGENCY DEPT VISIT MOD MDM: CPT | Mod: GC

## 2022-02-24 PROCEDURE — 72170 X-RAY EXAM OF PELVIS: CPT | Mod: 26

## 2022-02-24 PROCEDURE — 72125 CT NECK SPINE W/O DYE: CPT | Mod: 26,MA

## 2022-02-24 NOTE — ED PROVIDER NOTE - NSFOLLOWUPINSTRUCTIONS_ED_ALL_ED_FT
Fall Prevention in the Home, Adult  Falls can cause injuries and can affect people from all age groups. There are many simple things that you can do to make your home safe and to help prevent falls. Ask for help when making these changes, if needed.    What actions can I take to prevent falls?  General instructions     Use good lighting in all rooms. Replace any light bulbs that burn out.  Turn on lights if it is dark. Use night-lights.  Place frequently used items in easy-to-reach places. Lower the shelves around your home if necessary.  Set up furniture so that there are clear paths around it. Avoid moving your furniture around.  Remove throw rugs and other tripping hazards from the floor.  Avoid walking on wet floors.  Fix any uneven floor surfaces.  Add color or contrast paint or tape to grab bars and handrails in your home. Place contrasting color strips on the first and last steps of stairways.  When you use a stepladder, make sure that it is completely opened and that the sides are firmly locked. Have someone hold the ladder while you are using it. Do not climb a closed stepladder.  Be aware of any and all pets.  What can I do in the bathroom?     Keep the floor dry. Immediately clean up any water that spills onto the floor.  Remove soap buildup in the tub or shower on a regular basis.  Use non-skid mats or decals on the floor of the tub or shower.  Attach bath mats securely with double-sided, non-slip rug tape.  If you need to sit down while you are in the shower, use a plastic, non-slip stool.  Image ImageInstall grab bars by the toilet and in the tub and shower. Do not use towel bars as grab bars.  What can I do in the bedroom?     Make sure that a bedside light is easy to reach.  Do not use oversized bedding that drapes onto the floor.  Have a firm chair that has side arms to use for getting dressed.  What can I do in the kitchen?     Clean up any spills right away.  If you need to reach for something above you, use a sturdy step stool that has a grab bar.  Keep electrical cables out of the way.  Do not use floor polish or wax that makes floors slippery. If you must use wax, make sure that it is non-skid floor wax.  What can I do in the stairways?     Do not leave any items on the stairs.  Make sure that you have a light switch at the top of the stairs and the bottom of the stairs. Have them installed if you do not have them.  Make sure that there are handrails on both sides of the stairs. Fix handrails that are broken or loose. Make sure that handrails are as long as the stairways.  Install non-slip stair treads on all stairs in your home.  Avoid having throw rugs at the top or bottom of stairways, or secure the rugs with carpet tape to prevent them from moving.  Choose a carpet design that does not hide the edge of steps on the stairway.  Check any carpeting to make sure that it is firmly attached to the stairs. Fix any carpet that is loose or worn.  What can I do on the outside of my home?     Use bright outdoor lighting.  Regularly repair the edges of walkways and driveways and fix any cracks.  Remove high doorway thresholds.  Trim any shrubbery on the main path into your home.  Regularly check that handrails are securely fastened and in good repair. Both sides of any steps should have handrails.  Install guardrails along the edges of any raised decks or porches.  Clear walkways of debris and clutter, including tools and rocks.  Have leaves, snow, and ice cleared regularly.  Use sand or salt on walkways during winter months.  In the garage, clean up any spills right away, including grease or oil spills.  What other actions can I take?     Wear closed-toe shoes that fit well and support your feet. Wear shoes that have rubber soles or low heels.  Use mobility aids as needed, such as canes, walkers, scooters, and crutches.  Review your medicines with your health care provider. Some medicines can cause dizziness or changes in blood pressure, which increase your risk of falling.  Talk with your health care provider about other ways that you can decrease your risk of falls. This may include working with a physical therapist or  to improve your strength, balance, and endurance.    Where to find more information  Centers for Disease Control and Prevention, [place]PES[place]: https://www.cdc.gov  National [place] on Aging: https://bv4kuzl.belen.nih.gov  Contact a health care provider if:  You are afraid of falling at home.  You feel weak, drowsy, or dizzy at home.  You fall at home.  Summary  There are many simple things that you can do to make your home safe and to help prevent falls.  Ways to make your home safe include removing tripping hazards and installing grab bars in the bathroom.  Ask for help when making these changes in your home.  This information is not intended to replace advice given to you by your health care provider. Make sure you discuss any questions you have with your health care provider.

## 2022-02-24 NOTE — ED PROVIDER NOTE - CLINICAL SUMMARY MEDICAL DECISION MAKING FREE TEXT BOX
Pt presented after a fall out of her wheelchair. Required labs, imaging. No acute findings. WIll dc with outpt f/up.

## 2022-02-24 NOTE — ED PROVIDER NOTE - OBJECTIVE STATEMENT
72 yo female, PMHx dementia, CVA with residual right hemiparesis, HTN, presents s/p witnessed fall from NH from wheelchair and hit forehead but no LOC. Patient is nonverbal.

## 2022-02-24 NOTE — ED ADULT NURSE NOTE - OBJECTIVE STATEMENT
Pt BIBA from [facility] s/p fall forward out of wheelchair, hit forehead on floor as per transfer sheet. Transfer sheet also notes pt with new confusion and vomit x1 prior to fall today. Pt non-verbal at baseline.  No anticoag/LOC as per transfer papers.

## 2022-02-24 NOTE — ED PROVIDER NOTE - NSFOLLOWUPCLINICS_GEN_ALL_ED_FT
[facility] ([facility])  Rehabilitation  [facility] [address], [address] [place] [address]  Phone: [phone]  Fax:

## 2022-02-24 NOTE — ED ADULT NURSE NOTE - CAS TRG GENERAL AIRWAY, MLM
Pt asleep for half of shift. VSS. A&Ox4. States that pain is minimal and tolerable. No PRN given overnight. Pt is independent in room. Up to bathroom x1 overnight. PIV L hand SL. Pt able to make needs known. Call light within reach.   Patent

## 2022-02-24 NOTE — ED PROVIDER NOTE - PROGRESS NOTE DETAILS
CP: Attempted to reach NH but unable to speak to anyone. CT C-spine shows c1-c2 rotary subluxation. Discussed with neurosurgery who says this appears to be physiologic -rogers

## 2022-02-24 NOTE — ED ADULT TRIAGE NOTE - CHIEF COMPLAINT QUOTE
pt BIBA for witnessed fall from wheelchair (-) head trauma. staff told ems pt was vomiting earlier before the fall. pt has hx of CVA w/ residual

## 2022-02-24 NOTE — ED PROVIDER NOTE - PATIENT PORTAL LINK FT
You can access the FollowMyHealth Patient Portal offered by [facility] by registering at the following website: [email or web address]. By joining [employer]’s FollowMyHealth portal, you will also be able to view your health information using other applications (apps) compatible with our system.

## 2022-02-24 NOTE — ED PROVIDER NOTE - PHYSICAL EXAMINATION
CONSTITUTIONAL: Well-developed; well-nourished; in no acute distress.   SKIN: warm, dry  HEAD: Normocephalic; atraumatic.  EYES: no conjunctival injection.  ENT: No nasal discharge; airway clear.  NECK: Supple  CARD: S1, S2 normal; no murmurs, gallops, or rubs. Regular rate and rhythm.   RESP: No wheezes, rales or rhonchi.  ABD: soft ntnd  EXT: +right sided hemiparesis. moving left extremities  NEURO: Alert. Opens eye to verbal stimulation. Difficult to understand speech.  PSYCH: Cooperative, appropriate.

## 2022-02-24 NOTE — ED PROVIDER NOTE - ATTENDING CONTRIBUTION TO CARE
EMERGENCY DEPARTMENT HISTORY AND PHYSICAL EXAM      Date: 3/16/2020  Patient Name: Maryse Bourne    History of Presenting Illness     Chief Complaint   Patient presents with    Cough     pt c/o nonproductive cough and generalized weakness since yesterday. family dx with flu A yest.    Fatigue       History Provided By: Patient    HPI: Maryse Bourne, 28 y.o. female with PMHx significant for HIV, presents to the ED with cc of general malaise onset yesterday. Patient reports a dry cough, fatigue, nausea, mild sore throat, and generalized weakness. Her symptoms have gradually worsened. Her  tested positive for influenza A yesterday. She received the influenza vaccination this season. Of note, she is HIV positive on antiretroviral therapy and her CD4 count was 1188 one week ago. She denies fever, vomiting, diarrhea, shortness of breath, dysuria, abdominal pain. She has had a bilateral tubal ligation. There are no other complaints, changes, or physical findings at this time. PCP: Lamberto Li NP    No current facility-administered medications on file prior to encounter. Current Outpatient Medications on File Prior to Encounter   Medication Sig Dispense Refill    venlafaxine-SR (EFFEXOR-XR) 37.5 mg capsule Take 1 Cap by mouth daily. Take with 75mg XR tab. 30 Cap 2    cyclobenzaprine (FLEXERIL) 10 mg tablet Take 1 Tab by mouth three (3) times daily as needed for Muscle Spasm(s). 30 Tab 0    venlafaxine-SR (EFFEXOR-XR) 75 mg capsule Take 1 Cap by mouth daily.  27 Cap 0    BIKTARVY tab tablet TAKE 1 TABLET BY MOUTH ONCE DAILY  2       Past History     Past Medical History:  Past Medical History:   Diagnosis Date    Anxiety     Depression     Gestational hypertension     HIV (human immunodeficiency virus infection) ([facility] [address]     Other ill-defined conditions(799.89)     ADD       Past Surgical History:  Past Surgical History:   Procedure Laterality Date    HX HEENT      4 wisdom teeth removed    HX OTHER SURGICAL      tonsils and adenoids    HX OTHER SURGICAL      2018, blood clots following vaginal delivery that caused kidney failure    HX TUBAL LIGATION  2018       Family History:  Family History   Problem Relation Age of Onset    Diabetes Mother        Social History:  Social History     Tobacco Use    Smoking status: Current Every Day Smoker     Packs/day: 1.00     Years: 1.00     Pack years: 1.00    Smokeless tobacco: Never Used    Tobacco comment: prev quit for 3 years    Substance Use Topics    Alcohol use: Yes     Alcohol/week: 0.0 standard drinks     Comment: rare    Drug use: No       Allergies: Allergies   Allergen Reactions    Ibuprofen Other (comments)     \"enlarged liver\"    Iodine Hives         Review of Systems   Review of Systems   Constitutional: Positive for chills and fatigue. Negative for fever. HENT: Positive for sore throat. Negative for ear pain. Eyes: Negative for redness and visual disturbance. Respiratory: Positive for cough. Negative for shortness of breath. Cardiovascular: Negative for chest pain and palpitations. Gastrointestinal: Positive for nausea. Negative for abdominal pain, diarrhea and vomiting. Genitourinary: Negative for dysuria and hematuria. Musculoskeletal: Negative for back pain and gait problem. Skin: Negative for rash and wound. Neurological: Negative for dizziness and headaches. Psychiatric/Behavioral: Negative for behavioral problems and confusion. All other systems reviewed and are negative. Physical Exam   Physical Exam  Vitals signs and nursing note reviewed. Constitutional:       General: She is not in acute distress. Appearance: She is well-developed. Comments: Conversant, nontoxic appearing female. HENT:      Head: Normocephalic and atraumatic. Mouth/Throat:      Pharynx: Posterior oropharyngeal erythema present. Comments: Tonsils surgically absent.   Eyes: Conjunctiva/sclera: Conjunctivae normal.      Pupils: Pupils are equal, round, and reactive to light. Neck:      Musculoskeletal: Normal range of motion and neck supple. Cardiovascular:      Rate and Rhythm: Normal rate and regular rhythm. Heart sounds: Normal heart sounds. Pulmonary:      Effort: Pulmonary effort is normal. No respiratory distress. Breath sounds: Normal breath sounds. No wheezing or rales. Musculoskeletal: Normal range of motion. Lymphadenopathy:      Cervical: No cervical adenopathy. Skin:     General: Skin is warm and dry. Findings: No rash. Neurological:      Mental Status: She is alert and oriented to person, place, and time. GCS: GCS eye subscore is 4. GCS verbal subscore is 5. GCS motor subscore is 6. Cranial Nerves: No cranial nerve deficit. Sensory: No sensory deficit. Psychiatric:         Behavior: Behavior normal.           Diagnostic Study Results     Labs -     Recent Results (from the past 12 hour(s))   INFLUENZA A+B VIRAL AGS    Collection Time: 03/16/20  9:25 AM   Result Value Ref Range    Influenza A Antigen NEGATIVE  NEG      Influenza B Antigen NEGATIVE  NEG     STREP AG SCREEN, GROUP A    Collection Time: 03/16/20  9:36 AM   Result Value Ref Range    Group A Strep Ag ID NEGATIVE  NEG         Radiologic Studies -   XR CHEST PA LAT   Final Result   Impression:   1. No acute cardiopulmonary disease           CT Results  (Last 48 hours)    None        CXR Results  (Last 48 hours)               03/16/20 0934  XR CHEST PA LAT Final result    Impression:  Impression:   1. No acute cardiopulmonary disease           Narrative:  INDICATION:  cough onset yesterday, flu exposure, HIV exposure        Exam: Chest 2 views. Comparison: 11/30/2012. Findings: Cardiomediastinal silhouette is normal. Pulmonary vasculature is not   engorged. No focal parenchymal opacities, effusions, or pneumothorax. Bony   thorax is intact. Medical Decision Making   I am the first provider for this patient. I reviewed the vital signs, available nursing notes, past medical history, past surgical history, family history and social history. Vital Signs-Reviewed the patient's vital signs. Patient Vitals for the past 12 hrs:   Temp Pulse Resp BP SpO2   03/16/20 0919 98.2 °F (36.8 °C) (!) 108 18 (!) 143/118 99 %         Records Reviewed: Nursing Notes, Old Medical Records and Previous Laboratory Studies      Provider Notes (Medical Decision Making):   DDx: influenza, bronchitis, pneumonia, viral illness    CD4 count was 1188 one week ago so patient is not immunocompromised. Rapid strep is negative. Chest x-ray is negative for pneumonia. Influenza is negative. However, given known exposure to her  who is flu positive, plan to treat with Tamiflu as symptoms are within 48-hour window. Discussed symptomatic treatment. ED Course:   Initial assessment performed. The patients presenting problems have been discussed, and they are in agreement with the care plan formulated and outlined with them. I have encouraged them to ask questions as they arise throughout their visit. Disposition:  The patient has been re-evaluated and is ready for discharge. Reviewed available results with patient. Counseled patient on diagnosis and care plan. Patient has expressed understanding, and all questions have been answered. Patient agrees with plan and agrees to follow up as recommended, or to return to the ED if their symptoms worsen. Discharge instructions have been provided and explained to the patient, along with reasons to return to the ED. PLAN:  1. Discharge Medication List as of 3/16/2020 11:04 AM      START taking these medications    Details   oseltamivir (Tamiflu) 75 mg capsule Take 1 Cap by mouth two (2) times a day for 5 days. , Normal, Disp-10 Cap, R-0      predniSONE (STERAPRED DS) 10 mg dose pack Follow instructions on taper pack., Normal, Disp-21 Tab, R-0         CONTINUE these medications which have NOT CHANGED    Details   !! venlafaxine-SR (EFFEXOR-XR) 37.5 mg capsule Take 1 Cap by mouth daily. Take with 75mg XR tab., Normal, Disp-30 Cap, R-2      cyclobenzaprine (FLEXERIL) 10 mg tablet Take 1 Tab by mouth three (3) times daily as needed for Muscle Spasm(s). , Normal, Disp-30 Tab, R-0      !! venlafaxine-SR (EFFEXOR-XR) 75 mg capsule Take 1 Cap by mouth daily. , Normal, Disp-30 Cap, R-0      BIKTARVY tab tablet TAKE 1 TABLET BY MOUTH ONCE DAILY, Historical Med, R-2, XOCHITL       !! - Potential duplicate medications found. Please discuss with provider. 2.   Follow-up Information     Follow up With Specialties Details Why Contact Info    Rose Sethi NP Nurse Practitioner Schedule an appointment as soon as possible for a visit in 1 week for a recheck [address] [facility]  [phone]      [facility] EMERGENCY DEPT Emergency Medicine Go to If symptoms worsen, if having fevers, shortness of breath [address] [place] [address]  [phone]        Return to ED if worse     Diagnosis     Clinical Impression:   1. Influenza-like illness            Jason Conner PA-C I personally evaluated the patient. I reviewed the Resident’s or Physician Assistant’s note (as assigned above), and agree with the findings and plan except as documented in my note.  71 F from NH with hx of CVA with resulting aphasia, right hemiparesis, HTN, HLD, hypothyroidism, dementia was sent in for evaluation of fall our od jorge luis wheelchair, hitting her forehead. Witnessed by NH staff. No LOC. No notable injuries. VS reviewed, pt non-toxic appearing, NAD. Head ncat, no ecchymosis, contusions or hematoma, MMM, normal s1s2 without any murmurs, Lungs CTAB with normal work of breathing. abd +BS, s/nd/nt, extremities with contracture of the right UE and LE, alert, aphasic, + contracture of the right UE and LE, follows commands such as squeezing hands. No acute skin rashes. Plan is imaging, labs, and manage accordingly. I personally evaluated the patient. I reviewed the Resident’s or Physician Assistant’s note (as assigned above), and agree with the findings and plan except as documented in my note.  71 F from NH with hx of CVA with resulting aphasia, right hemiparesis, HTN, HLD, hypothyroidism, dementia was sent in for evaluation of fall out of her wheelchair, hitting her forehead. Witnessed by NH staff. No LOC. No notable injuries. VS reviewed, pt non-toxic appearing, NAD. Head ncat, no ecchymosis, contusions or hematoma, MMM, normal s1s2 without any murmurs, Lungs CTAB with normal work of breathing. abd +BS, s/nd/nt, extremities with contracture of the right UE and LE, alert, aphasic, + contracture of the right UE and LE, follows commands such as squeezing hands. No acute skin rashes. Plan is imaging, labs, and manage accordingly.

## 2022-02-25 VITALS
RESPIRATION RATE: 18 BRPM | SYSTOLIC BLOOD PRESSURE: 110 MMHG | HEART RATE: 72 BPM | TEMPERATURE: 99 F | OXYGEN SATURATION: 98 % | DIASTOLIC BLOOD PRESSURE: 56 MMHG

## 2022-04-19 NOTE — PATIENT PROFILE ADULT - HAS THE PATIENT BEEN ADMITTED FROM SHORT TERM REHAB?
Patient Education        Back Pain: Care Instructions  Your Care Instructions     Back pain has many possible causes. It is often related to problems with muscles and ligaments of the back. It may also be related to problems with the nerves, discs, or bones of the back. Moving, lifting, standing, sitting, or sleeping in an awkward way can strain the back. Sometimes you don't notice theinjury until later. Arthritis is another common cause of back pain. Although it may hurt a lot, back pain usually improves on its own within several weeks. Most people recover in 12 weeks or less. Using good home treatment and being careful not to stress your back can help you feel bettersooner. Follow-up care is a key part of your treatment and safety. Be sure to make and go to all appointments, and call your doctor if you are having problems. It's also a good idea to know your test results and keep alist of the medicines you take. How can you care for yourself at home?  Sit or lie in positions that are most comfortable and reduce your pain. Try one of these positions when you lie down:  ? Lie on your back with your knees bent and supported by large pillows. ? Lie on the floor with your legs on the seat of a sofa or chair. ? Lie on your side with your knees and hips bent and a pillow between your legs. ? Lie on your stomach if it does not make pain worse.  Do not sit up in bed, and avoid soft couches and twisted positions. Bed rest can help relieve pain at first, but it delays healing. Avoid bed rest after the first day of back pain.  Change positions every 30 minutes. If you must sit for long periods of time, take breaks from sitting. Get up and walk around, or lie in a comfortable position.  Try using a heating pad on a low or medium setting for 15 to 20 minutes every 2 or 3 hours. Try a warm shower in place of one session with the heating pad.  You can also try an ice pack for 10 to 15 minutes every 2 to 3 hours. Put a thin cloth between the ice pack and your skin.  Take pain medicines exactly as directed. ? If the doctor gave you a prescription medicine for pain, take it as prescribed. ? If you are not taking a prescription pain medicine, ask your doctor if you can take an over-the-counter medicine.  Take short walks several times a day. You can start with 5 to 10 minutes, 3 or 4 times a day, and work up to longer walks. Walk on level surfaces and avoid hills and stairs until your back is better.  Return to work and other activities as soon as you can. Continued rest without activity is usually not good for your back.  To prevent future back pain, do exercises to stretch and strengthen your back and stomach. Learn how to use good posture, safe lifting techniques, and proper body mechanics. When should you call for help? Call your doctor now or seek immediate medical care if:     You have new or worsening numbness in your legs.      You have new or worsening weakness in your legs. (This could make it hard to stand up.)      You lose control of your bladder or bowels. Watch closely for changes in your health, and be sure to contact your doctor if:     You have a fever, lose weight, or don't feel well.      You do not get better as expected. Where can you learn more? Go to [email or web address]://[email or web address].Tellme. org and sign in to your [employer] account. Enter J397 in the [employer] Source box to learn more about \"Back Pain: Care Instructions. \"     If you do not have an account, please click on the \"Sign Up Now\" link. Current as of: July 1, 2021               Content Version: 13.2  © 2006-2022 Healthwise, Incorporated. Care instructions adapted under license by [facility] ([facility]). If you have questions about a medical condition or this instruction, always ask your healthcare professional. [place] [address] any warranty or liability for your use of this information.          Patient Education        Trigger Finger: Care Instructions  Overview  A trigger finger is a finger stuck in a bent position. It happens when the tendon that bends and straightens the thumb or finger can't slide smoothly under the ligaments that hold the tendon against the bones. In most cases, it's caused by a bump (nodule) that forms on the tendon. The bent finger usuallystraightens out on its own. A trigger finger can be painful. But it normally isn't a serious problem. Trigger fingers seem to occur more in some groups of people. These groupsinclude:   People who have diabetes or arthritis.  People who have injured their hands in the past.   Musicians.  People who  tools often. Rest and exercises may help your finger relax so that it can bend. You may get a corticosteroid shot. This can reduce swelling and pain. Your doctor may put a splint on your finger. It will give your finger some rest. [place] need surgery if the finger keeps locking in a bent position. Follow-up care is a key part of your treatment and safety. Be sure to make and go to all appointments, and call your doctor if you are having problems. It's also a good idea to know your test results and keep alist of the medicines you take. How can you care for yourself at home?  If your doctor put a splint on your finger, wear it as directed. Don't take it off until your doctor says you can.  You may need to change your activities to avoid movements that irritate the finger.  Take your medicines exactly as prescribed. Call your doctor if you think you are having a problem with your medicine.  Ask your doctor if you can take an over-the-counter pain medicine, such as acetaminophen (Tylenol), ibuprofen (Advil, Motrin), or naproxen (Aleve). Be safe with medicines. Read and follow all instructions on the label.  If your doctor recommends exercises, do them as directed. When should you call for help?    Call your doctor now or seek immediate medical care if:     Your finger locks in a bent position and will not straighten. Watch closely for changes in your health, and be sure to contact your doctor if:     You do not get better as expected. Where can you learn more? Go to [email or web address] org and sign in to your [employer] account. Enter M826 in the [employer] box to learn more about \"Trigger Finger: Care Instructions. \"     If you do not have an account, please click on the \"Sign Up Now\" link. Current as of: July 1, 2021               Content Version: 13.2  © 7277-1318 Healthwise, Incorporated. Care instructions adapted under license by [facility] ([facility]). If you have questions about a medical condition or this instruction, always ask your healthcare professional. [address] any warranty or liability for your use of this information. no

## 2022-08-31 ENCOUNTER — OUTPATIENT (OUTPATIENT)
Dept: OUTPATIENT SERVICES | Facility: HOSPITAL | Age: 72
LOS: 1 days | Discharge: HOME | End: 2022-08-31

## 2022-12-17 NOTE — H&P ADULT - REASON FOR ADMISSION
Normal vision: sees adequately in most situations; can see medication labels, newsprint
Constipation

## 2023-08-13 ENCOUNTER — INPATIENT (INPATIENT)
Facility: HOSPITAL | Age: 73
LOS: 11 days | Discharge: HOSPICE | DRG: 871 | End: 2023-08-25
Attending: INTERNAL MEDICINE | Admitting: INTERNAL MEDICINE
Payer: MEDICARE

## 2023-08-13 VITALS
SYSTOLIC BLOOD PRESSURE: 118 MMHG | HEART RATE: 140 BPM | TEMPERATURE: 100 F | OXYGEN SATURATION: 96 % | DIASTOLIC BLOOD PRESSURE: 67 MMHG

## 2023-08-13 PROCEDURE — 99285 EMERGENCY DEPT VISIT HI MDM: CPT

## 2023-08-13 RX ORDER — VANCOMYCIN HCL 1 G
1000 VIAL (EA) INTRAVENOUS ONCE
Refills: 0 | Status: COMPLETED | OUTPATIENT
Start: 2023-08-13 | End: 2023-08-13

## 2023-08-13 RX ORDER — ACETAMINOPHEN 500 MG
975 TABLET ORAL ONCE
Refills: 0 | Status: DISCONTINUED | OUTPATIENT
Start: 2023-08-13 | End: 2023-08-14

## 2023-08-13 RX ORDER — SODIUM CHLORIDE 9 MG/ML
2000 INJECTION, SOLUTION INTRAVENOUS ONCE
Refills: 0 | Status: COMPLETED | OUTPATIENT
Start: 2023-08-13 | End: 2023-08-13

## 2023-08-13 RX ORDER — CEFEPIME 1 G/1
2000 INJECTION, POWDER, FOR SOLUTION INTRAMUSCULAR; INTRAVENOUS ONCE
Refills: 0 | Status: COMPLETED | OUTPATIENT
Start: 2023-08-13 | End: 2023-08-13

## 2023-08-13 NOTE — ED ADULT TRIAGE NOTE - CHIEF COMPLAINT QUOTE
Pt presents to the Ed w/ c/o of Shortness of breath and altered mental status. Pt on RA on scene was 88%. BGL on scene 267. Pt is nonverbal at baseline. Pt is responsive to painful stimuli at this time. PMHx hypothyroidism, HLD, CVA in the past, vertigo

## 2023-08-14 DIAGNOSIS — A41.9 SEPSIS, UNSPECIFIED ORGANISM: ICD-10-CM

## 2023-08-14 LAB
ALBUMIN SERPL ELPH-MCNC: 3.7 G/DL — SIGNIFICANT CHANGE UP (ref 3.5–5.2)
ALBUMIN SERPL ELPH-MCNC: 3.7 G/DL — SIGNIFICANT CHANGE UP (ref 3.5–5.2)
ALP SERPL-CCNC: 77 U/L — SIGNIFICANT CHANGE UP (ref 30–115)
ALP SERPL-CCNC: 82 U/L — SIGNIFICANT CHANGE UP (ref 30–115)
ALT FLD-CCNC: 19 U/L — SIGNIFICANT CHANGE UP (ref 0–41)
ALT FLD-CCNC: 20 U/L — SIGNIFICANT CHANGE UP (ref 0–41)
ANION GAP SERPL CALC-SCNC: 12 MMOL/L — SIGNIFICANT CHANGE UP (ref 7–14)
ANION GAP SERPL CALC-SCNC: 13 MMOL/L — SIGNIFICANT CHANGE UP (ref 7–14)
APPEARANCE UR: ABNORMAL
APTT BLD: 29.6 SEC — SIGNIFICANT CHANGE UP (ref 27–39.2)
AST SERPL-CCNC: 27 U/L — SIGNIFICANT CHANGE UP (ref 0–41)
AST SERPL-CCNC: 27 U/L — SIGNIFICANT CHANGE UP (ref 0–41)
BACTERIA # UR AUTO: ABNORMAL /HPF
BASE EXCESS BLDV CALC-SCNC: 2.2 MMOL/L — SIGNIFICANT CHANGE UP (ref -2–3)
BASE EXCESS BLDV CALC-SCNC: 3.7 MMOL/L — HIGH (ref -2–3)
BASOPHILS # BLD AUTO: 0 K/UL — SIGNIFICANT CHANGE UP (ref 0–0.2)
BASOPHILS # BLD AUTO: 0.03 K/UL — SIGNIFICANT CHANGE UP (ref 0–0.2)
BASOPHILS NFR BLD AUTO: 0 % — SIGNIFICANT CHANGE UP (ref 0–1)
BASOPHILS NFR BLD AUTO: 0.3 % — SIGNIFICANT CHANGE UP (ref 0–1)
BILIRUB SERPL-MCNC: 0.9 MG/DL — SIGNIFICANT CHANGE UP (ref 0.2–1.2)
BILIRUB SERPL-MCNC: 1.1 MG/DL — SIGNIFICANT CHANGE UP (ref 0.2–1.2)
BILIRUB UR-MCNC: NEGATIVE — SIGNIFICANT CHANGE UP
BUN SERPL-MCNC: 14 MG/DL — SIGNIFICANT CHANGE UP (ref 10–20)
BUN SERPL-MCNC: 15 MG/DL — SIGNIFICANT CHANGE UP (ref 10–20)
C DIFF BY PCR RESULT: SIGNIFICANT CHANGE UP
CA-I SERPL-SCNC: 1.21 MMOL/L — SIGNIFICANT CHANGE UP (ref 1.15–1.33)
CA-I SERPL-SCNC: 1.23 MMOL/L — SIGNIFICANT CHANGE UP (ref 1.15–1.33)
CALCIUM SERPL-MCNC: 8.8 MG/DL — SIGNIFICANT CHANGE UP (ref 8.4–10.4)
CALCIUM SERPL-MCNC: 9 MG/DL — SIGNIFICANT CHANGE UP (ref 8.4–10.5)
CAST: 5 /LPF — HIGH (ref 0–4)
CHLORIDE SERPL-SCNC: 102 MMOL/L — SIGNIFICANT CHANGE UP (ref 98–110)
CHLORIDE SERPL-SCNC: 104 MMOL/L — SIGNIFICANT CHANGE UP (ref 98–110)
CO2 SERPL-SCNC: 23 MMOL/L — SIGNIFICANT CHANGE UP (ref 17–32)
CO2 SERPL-SCNC: 25 MMOL/L — SIGNIFICANT CHANGE UP (ref 17–32)
COLOR SPEC: SIGNIFICANT CHANGE UP
CREAT SERPL-MCNC: 0.5 MG/DL — LOW (ref 0.7–1.5)
CREAT SERPL-MCNC: 0.7 MG/DL — SIGNIFICANT CHANGE UP (ref 0.7–1.5)
DIFF PNL FLD: ABNORMAL
EGFR: 91 ML/MIN/1.73M2 — SIGNIFICANT CHANGE UP
EGFR: 99 ML/MIN/1.73M2 — SIGNIFICANT CHANGE UP
EOSINOPHIL # BLD AUTO: 0 K/UL — SIGNIFICANT CHANGE UP (ref 0–0.7)
EOSINOPHIL # BLD AUTO: 0 K/UL — SIGNIFICANT CHANGE UP (ref 0–0.7)
EOSINOPHIL NFR BLD AUTO: 0 % — SIGNIFICANT CHANGE UP (ref 0–8)
EOSINOPHIL NFR BLD AUTO: 0 % — SIGNIFICANT CHANGE UP (ref 0–8)
FLUAV AG NPH QL: SIGNIFICANT CHANGE UP
FLUBV AG NPH QL: SIGNIFICANT CHANGE UP
GAS PNL BLDV: 135 MMOL/L — LOW (ref 136–145)
GAS PNL BLDV: 135 MMOL/L — LOW (ref 136–145)
GAS PNL BLDV: SIGNIFICANT CHANGE UP
GAS PNL BLDV: SIGNIFICANT CHANGE UP
GLUCOSE SERPL-MCNC: 119 MG/DL — HIGH (ref 70–99)
GLUCOSE SERPL-MCNC: 261 MG/DL — HIGH (ref 70–99)
GLUCOSE UR QL: NEGATIVE MG/DL — SIGNIFICANT CHANGE UP
HCO3 BLDV-SCNC: 26 MMOL/L — SIGNIFICANT CHANGE UP (ref 22–29)
HCO3 BLDV-SCNC: 30 MMOL/L — HIGH (ref 22–29)
HCT VFR BLD CALC: 37.7 % — SIGNIFICANT CHANGE UP (ref 37–47)
HCT VFR BLD CALC: 38.2 % — SIGNIFICANT CHANGE UP (ref 37–47)
HCT VFR BLDA CALC: 38 % — LOW (ref 39–51)
HCT VFR BLDA CALC: 39 % — SIGNIFICANT CHANGE UP (ref 39–51)
HGB BLD CALC-MCNC: 12.8 G/DL — SIGNIFICANT CHANGE UP (ref 12.6–17.4)
HGB BLD CALC-MCNC: 12.9 G/DL — SIGNIFICANT CHANGE UP (ref 12.6–17.4)
HGB BLD-MCNC: 12.3 G/DL — SIGNIFICANT CHANGE UP (ref 12–16)
HGB BLD-MCNC: 12.6 G/DL — SIGNIFICANT CHANGE UP (ref 12–16)
HYALINE CASTS # UR AUTO: 5 /LPF — HIGH (ref 0–4)
IMM GRANULOCYTES NFR BLD AUTO: 0.4 % — HIGH (ref 0.1–0.3)
INR BLD: 1.08 RATIO — SIGNIFICANT CHANGE UP (ref 0.65–1.3)
KETONES UR-MCNC: ABNORMAL MG/DL
LACTATE BLDV-MCNC: 2.9 MMOL/L — HIGH (ref 0.5–2)
LACTATE BLDV-MCNC: 3.6 MMOL/L — HIGH (ref 0.5–2)
LACTATE SERPL-SCNC: 1.4 MMOL/L — SIGNIFICANT CHANGE UP (ref 0.7–2)
LACTATE SERPL-SCNC: 3.6 MMOL/L — HIGH (ref 0.7–2)
LEUKOCYTE ESTERASE UR-ACNC: ABNORMAL
LYMPHOCYTES # BLD AUTO: 0.06 K/UL — LOW (ref 1.2–3.4)
LYMPHOCYTES # BLD AUTO: 0.8 % — LOW (ref 20.5–51.1)
LYMPHOCYTES # BLD AUTO: 0.92 K/UL — LOW (ref 1.2–3.4)
LYMPHOCYTES # BLD AUTO: 8.6 % — LOW (ref 20.5–51.1)
MAGNESIUM SERPL-MCNC: 1.9 MG/DL — SIGNIFICANT CHANGE UP (ref 1.8–2.4)
MANUAL SMEAR VERIFICATION: SIGNIFICANT CHANGE UP
MCHC RBC-ENTMCNC: 30.2 PG — SIGNIFICANT CHANGE UP (ref 27–31)
MCHC RBC-ENTMCNC: 30.6 PG — SIGNIFICANT CHANGE UP (ref 27–31)
MCHC RBC-ENTMCNC: 32.6 G/DL — SIGNIFICANT CHANGE UP (ref 32–37)
MCHC RBC-ENTMCNC: 33 G/DL — SIGNIFICANT CHANGE UP (ref 32–37)
MCV RBC AUTO: 92.6 FL — SIGNIFICANT CHANGE UP (ref 81–99)
MCV RBC AUTO: 92.7 FL — SIGNIFICANT CHANGE UP (ref 81–99)
METAMYELOCYTES # FLD: 0.9 % — HIGH (ref 0–0)
MONOCYTES # BLD AUTO: 0.26 K/UL — SIGNIFICANT CHANGE UP (ref 0.1–0.6)
MONOCYTES # BLD AUTO: 0.62 K/UL — HIGH (ref 0.1–0.6)
MONOCYTES NFR BLD AUTO: 3.5 % — SIGNIFICANT CHANGE UP (ref 1.7–9.3)
MONOCYTES NFR BLD AUTO: 5.8 % — SIGNIFICANT CHANGE UP (ref 1.7–9.3)
NEUTROPHILS # BLD AUTO: 7.04 K/UL — HIGH (ref 1.4–6.5)
NEUTROPHILS # BLD AUTO: 9.11 K/UL — HIGH (ref 1.4–6.5)
NEUTROPHILS NFR BLD AUTO: 84.9 % — HIGH (ref 42.2–75.2)
NEUTROPHILS NFR BLD AUTO: 94.8 % — HIGH (ref 42.2–75.2)
NITRITE UR-MCNC: NEGATIVE — SIGNIFICANT CHANGE UP
NRBC # BLD: 0 /100 WBCS — SIGNIFICANT CHANGE UP (ref 0–0)
NRBC # BLD: 1 /100 — HIGH (ref 0–0)
NRBC # BLD: SIGNIFICANT CHANGE UP /100 WBCS (ref 0–0)
PCO2 BLDV: 39 MMHG — SIGNIFICANT CHANGE UP (ref 39–42)
PCO2 BLDV: 52 MMHG — HIGH (ref 39–42)
PH BLDV: 7.37 — SIGNIFICANT CHANGE UP (ref 7.32–7.43)
PH BLDV: 7.44 — HIGH (ref 7.32–7.43)
PH UR: 5.5 — SIGNIFICANT CHANGE UP (ref 5–8)
PLAT MORPH BLD: NORMAL — SIGNIFICANT CHANGE UP
PLATELET # BLD AUTO: 135 K/UL — SIGNIFICANT CHANGE UP (ref 130–400)
PLATELET # BLD AUTO: 168 K/UL — SIGNIFICANT CHANGE UP (ref 130–400)
PMV BLD: 10.9 FL — HIGH (ref 7.4–10.4)
PMV BLD: 11.9 FL — HIGH (ref 7.4–10.4)
PO2 BLDV: 38 MMHG — SIGNIFICANT CHANGE UP
PO2 BLDV: 70 MMHG — SIGNIFICANT CHANGE UP
POIKILOCYTOSIS BLD QL AUTO: SLIGHT — SIGNIFICANT CHANGE UP
POTASSIUM BLDV-SCNC: 3.6 MMOL/L — SIGNIFICANT CHANGE UP (ref 3.5–5.1)
POTASSIUM BLDV-SCNC: 4.1 MMOL/L — SIGNIFICANT CHANGE UP (ref 3.5–5.1)
POTASSIUM SERPL-MCNC: 3.7 MMOL/L — SIGNIFICANT CHANGE UP (ref 3.5–5)
POTASSIUM SERPL-MCNC: 4 MMOL/L — SIGNIFICANT CHANGE UP (ref 3.5–5)
POTASSIUM SERPL-SCNC: 3.7 MMOL/L — SIGNIFICANT CHANGE UP (ref 3.5–5)
POTASSIUM SERPL-SCNC: 4 MMOL/L — SIGNIFICANT CHANGE UP (ref 3.5–5)
PROT SERPL-MCNC: 6 G/DL — SIGNIFICANT CHANGE UP (ref 6–8)
PROT SERPL-MCNC: 6.1 G/DL — SIGNIFICANT CHANGE UP (ref 6–8)
PROT UR-MCNC: 100 MG/DL
PROTHROM AB SERPL-ACNC: 12.4 SEC — SIGNIFICANT CHANGE UP (ref 9.95–12.87)
RBC # BLD: 4.07 M/UL — LOW (ref 4.2–5.4)
RBC # BLD: 4.12 M/UL — LOW (ref 4.2–5.4)
RBC # FLD: 12.7 % — SIGNIFICANT CHANGE UP (ref 11.5–14.5)
RBC # FLD: 12.7 % — SIGNIFICANT CHANGE UP (ref 11.5–14.5)
RBC BLD AUTO: NORMAL — SIGNIFICANT CHANGE UP
RBC CASTS # UR COMP ASSIST: 7 /HPF — HIGH (ref 0–4)
RSV RNA NPH QL NAA+NON-PROBE: SIGNIFICANT CHANGE UP
SAO2 % BLDV: 49.5 % — SIGNIFICANT CHANGE UP
SAO2 % BLDV: 95.7 % — SIGNIFICANT CHANGE UP
SARS-COV-2 RNA SPEC QL NAA+PROBE: SIGNIFICANT CHANGE UP
SODIUM SERPL-SCNC: 138 MMOL/L — SIGNIFICANT CHANGE UP (ref 135–146)
SODIUM SERPL-SCNC: 141 MMOL/L — SIGNIFICANT CHANGE UP (ref 135–146)
SP GR SPEC: >1.03 — HIGH (ref 1–1.03)
SQUAMOUS # UR AUTO: 12 /HPF — HIGH (ref 0–5)
TROPONIN T SERPL-MCNC: <0.01 NG/ML — SIGNIFICANT CHANGE UP
UROBILINOGEN FLD QL: 0.2 MG/DL — SIGNIFICANT CHANGE UP (ref 0.2–1)
WBC # BLD: 10.72 K/UL — SIGNIFICANT CHANGE UP (ref 4.8–10.8)
WBC # BLD: 7.43 K/UL — SIGNIFICANT CHANGE UP (ref 4.8–10.8)
WBC # FLD AUTO: 10.72 K/UL — SIGNIFICANT CHANGE UP (ref 4.8–10.8)
WBC # FLD AUTO: 7.43 K/UL — SIGNIFICANT CHANGE UP (ref 4.8–10.8)
WBC UR QL: 2 /HPF — SIGNIFICANT CHANGE UP (ref 0–5)

## 2023-08-14 PROCEDURE — 85027 COMPLETE CBC AUTOMATED: CPT

## 2023-08-14 PROCEDURE — 84145 PROCALCITONIN (PCT): CPT

## 2023-08-14 PROCEDURE — 99223 1ST HOSP IP/OBS HIGH 75: CPT

## 2023-08-14 PROCEDURE — 74018 RADEX ABDOMEN 1 VIEW: CPT

## 2023-08-14 PROCEDURE — 87493 C DIFF AMPLIFIED PROBE: CPT

## 2023-08-14 PROCEDURE — 71045 X-RAY EXAM CHEST 1 VIEW: CPT

## 2023-08-14 PROCEDURE — 80053 COMPREHEN METABOLIC PANEL: CPT

## 2023-08-14 PROCEDURE — 80048 BASIC METABOLIC PNL TOTAL CA: CPT

## 2023-08-14 PROCEDURE — 99291 CRITICAL CARE FIRST HOUR: CPT

## 2023-08-14 PROCEDURE — 74230 X-RAY XM SWLNG FUNCJ C+: CPT

## 2023-08-14 PROCEDURE — 92526 ORAL FUNCTION THERAPY: CPT | Mod: GN

## 2023-08-14 PROCEDURE — 87086 URINE CULTURE/COLONY COUNT: CPT

## 2023-08-14 PROCEDURE — 93010 ELECTROCARDIOGRAM REPORT: CPT

## 2023-08-14 PROCEDURE — 71275 CT ANGIOGRAPHY CHEST: CPT | Mod: 26,MA

## 2023-08-14 PROCEDURE — 74177 CT ABD & PELVIS W/CONTRAST: CPT | Mod: 26,MA

## 2023-08-14 PROCEDURE — 83605 ASSAY OF LACTIC ACID: CPT

## 2023-08-14 PROCEDURE — 71045 X-RAY EXAM CHEST 1 VIEW: CPT | Mod: 26

## 2023-08-14 PROCEDURE — 83735 ASSAY OF MAGNESIUM: CPT

## 2023-08-14 PROCEDURE — 85025 COMPLETE CBC W/AUTO DIFF WBC: CPT

## 2023-08-14 PROCEDURE — 36415 COLL VENOUS BLD VENIPUNCTURE: CPT

## 2023-08-14 PROCEDURE — 81001 URINALYSIS AUTO W/SCOPE: CPT

## 2023-08-14 PROCEDURE — 92611 MOTION FLUOROSCOPY/SWALLOW: CPT | Mod: GN

## 2023-08-14 PROCEDURE — 93005 ELECTROCARDIOGRAM TRACING: CPT

## 2023-08-14 PROCEDURE — 92610 EVALUATE SWALLOWING FUNCTION: CPT | Mod: GN

## 2023-08-14 RX ORDER — MEMANTINE HYDROCHLORIDE 10 MG/1
10 TABLET ORAL ONCE
Refills: 0 | Status: COMPLETED | OUTPATIENT
Start: 2023-08-14 | End: 2023-08-14

## 2023-08-14 RX ORDER — CEFEPIME 1 G/1
2000 INJECTION, POWDER, FOR SOLUTION INTRAMUSCULAR; INTRAVENOUS EVERY 12 HOURS
Refills: 0 | Status: DISCONTINUED | OUTPATIENT
Start: 2023-08-14 | End: 2023-08-16

## 2023-08-14 RX ORDER — ATORVASTATIN CALCIUM 80 MG/1
20 TABLET, FILM COATED ORAL AT BEDTIME
Refills: 0 | Status: DISCONTINUED | OUTPATIENT
Start: 2023-08-14 | End: 2023-08-22

## 2023-08-14 RX ORDER — ACETAMINOPHEN 500 MG
650 TABLET ORAL ONCE
Refills: 0 | Status: COMPLETED | OUTPATIENT
Start: 2023-08-14 | End: 2023-08-14

## 2023-08-14 RX ORDER — ENOXAPARIN SODIUM 100 MG/ML
40 INJECTION SUBCUTANEOUS EVERY 24 HOURS
Refills: 0 | Status: DISCONTINUED | OUTPATIENT
Start: 2023-08-14 | End: 2023-08-25

## 2023-08-14 RX ORDER — ACETAMINOPHEN 500 MG
1000 TABLET ORAL ONCE
Refills: 0 | Status: COMPLETED | OUTPATIENT
Start: 2023-08-14 | End: 2023-08-14

## 2023-08-14 RX ORDER — GLYCERIN ADULT
1 SUPPOSITORY, RECTAL RECTAL ONCE
Refills: 0 | Status: COMPLETED | OUTPATIENT
Start: 2023-08-14 | End: 2023-08-14

## 2023-08-14 RX ORDER — CEFEPIME 1 G/1
2000 INJECTION, POWDER, FOR SOLUTION INTRAMUSCULAR; INTRAVENOUS EVERY 8 HOURS
Refills: 0 | Status: DISCONTINUED | OUTPATIENT
Start: 2023-08-14 | End: 2023-08-14

## 2023-08-14 RX ORDER — METRONIDAZOLE 500 MG
500 TABLET ORAL EVERY 8 HOURS
Refills: 0 | Status: DISCONTINUED | OUTPATIENT
Start: 2023-08-14 | End: 2023-08-14

## 2023-08-14 RX ORDER — METRONIDAZOLE 500 MG
500 TABLET ORAL ONCE
Refills: 0 | Status: COMPLETED | OUTPATIENT
Start: 2023-08-14 | End: 2023-08-14

## 2023-08-14 RX ORDER — GABAPENTIN 400 MG/1
100 CAPSULE ORAL ONCE
Refills: 0 | Status: COMPLETED | OUTPATIENT
Start: 2023-08-14 | End: 2023-08-14

## 2023-08-14 RX ORDER — SODIUM CHLORIDE 9 MG/ML
1000 INJECTION, SOLUTION INTRAVENOUS
Refills: 0 | Status: DISCONTINUED | OUTPATIENT
Start: 2023-08-14 | End: 2023-08-16

## 2023-08-14 RX ADMIN — Medication 1 SUPPOSITORY(S): at 04:52

## 2023-08-14 RX ADMIN — ENOXAPARIN SODIUM 40 MILLIGRAM(S): 100 INJECTION SUBCUTANEOUS at 22:09

## 2023-08-14 RX ADMIN — Medication 250 MILLIGRAM(S): at 00:03

## 2023-08-14 RX ADMIN — Medication 400 MILLIGRAM(S): at 23:30

## 2023-08-14 RX ADMIN — Medication 100 MILLIGRAM(S): at 03:45

## 2023-08-14 RX ADMIN — CEFEPIME 100 MILLIGRAM(S): 1 INJECTION, POWDER, FOR SOLUTION INTRAMUSCULAR; INTRAVENOUS at 07:57

## 2023-08-14 RX ADMIN — CEFEPIME 100 MILLIGRAM(S): 1 INJECTION, POWDER, FOR SOLUTION INTRAMUSCULAR; INTRAVENOUS at 01:45

## 2023-08-14 RX ADMIN — SODIUM CHLORIDE 50 MILLILITER(S): 9 INJECTION, SOLUTION INTRAVENOUS at 07:57

## 2023-08-14 RX ADMIN — Medication 650 MILLIGRAM(S): at 00:24

## 2023-08-14 RX ADMIN — CEFEPIME 100 MILLIGRAM(S): 1 INJECTION, POWDER, FOR SOLUTION INTRAMUSCULAR; INTRAVENOUS at 14:00

## 2023-08-14 RX ADMIN — SODIUM CHLORIDE 2000 MILLILITER(S): 9 INJECTION, SOLUTION INTRAVENOUS at 00:03

## 2023-08-14 NOTE — H&P ADULT - ASSESSMENT
Ms Laird is a 72 year old Female with a PMHx of CVA (non-verbal and bedbound), hypothyroidism, hypercholesterolemia bought to the ED from home by sister due to abd pain/ fever and labored breathing. When EMS arrived on scene, pt. was hypoxic at 86% on RA and altered. Pt. was placed in NRB 15L and O2 sat improved to 96%. Pt's sister states pt was exposed to a sick caregiver and starting 3 days ago pt has been noted to have nasal congestion and non productive cough.    #Sepsis 2/2 Pneumonia  #Stercoral Colitis  - CT Chest/Abd/Pelvis showed Bilateral patchy airspace opacities with left lower lobe consolidation consistent with pneumonia in the appropriate clinical setting. Debris or secretions within trachea, with occlusion of small left lower lobe peripheral airway; correlate for aspiration. Large rectal fecal stool burden, 11 cm AP. Contrast, presumably excreted, within vagina, suspicious for vesicovaginal fistula. Findings suggestive of mild colitis involving segment of descending colon; no abscess.  - Febrile 102; Tachycardic on admission  - Pt may have aspirated; NPO for now; SpSw eval; Pt on Pureed Diet at home  - Procal  - RVP, Legionella/strep urine antigen   - Nebs prn sob/wheezing  - Incentive spirometry  - NC O2 prn sob, hypoxia with goal >92%  - Sputum culture/induction  - IV antibiotics - Unasyn and Levaquin  - ID consult  - UA/Urine culture  - Bowel Regimen    #Hypercholesteremia  - c/w statin    #Hypothyroidism  - Med rec obtained from sister; Said she is not on Levothyroxine  - Get TSH; may not be accurate in the setting of critical illness    #Misc  - DVT Prophylaxis: Lovenox  - GI Prophylaxis: None needed  - Diet: NPO pending SpSw  - Activity: Bedbound  - Code Status: Full   Ms Laird is a 72 year old Female with a PMHx of CVA (non-verbal and bedbound), hypothyroidism, hypercholesterolemia bought to the ED from home by sister due to abd pain/ fever and labored breathing. When EMS arrived on scene, pt. was hypoxic at 86% on RA and altered. Pt. was placed in NRB 15L and O2 sat improved to 96%. Pt's sister states pt was exposed to a sick caregiver and starting 3 days ago pt has been noted to have nasal congestion and non productive cough.    #Sepsis 2/2 Pneumonia  #Stercoral Colitis  - CT Chest/Abd/Pelvis showed Bilateral patchy airspace opacities with left lower lobe consolidation consistent with pneumonia in the appropriate clinical setting. Debris or secretions within trachea, with occlusion of small left lower lobe peripheral airway; correlate for aspiration. Large rectal fecal stool burden, 11 cm AP. Contrast, presumably excreted, within vagina, suspicious for vesicovaginal fistula. Findings suggestive of mild colitis involving segment of descending colon; no abscess.  - Febrile 102; Tachycardic on admission  - Pt may have aspirated; NPO for now; SpSw eval; Pt on Pureed Diet at home  - Procal  - RVP, Legionella/strep urine antigen   - Nebs prn sob/wheezing  - Incentive spirometry  - NC O2 prn sob, hypoxia with goal >92%  - Sputum culture/induction  - IV antibiotics - Cefepime and Flagyl  - ID consult  - UA/Urine culture - pt already received abx  - Bowel Regimen    #Hypercholesteremia  - c/w statin    #Hypothyroidism  - Med rec obtained from sister; Said she is not on Levothyroxine  - TSH; may not be accurate in the setting of critical illness    #Misc  - DVT Prophylaxis: Lovenox  - GI Prophylaxis: None needed  - Diet: NPO pending SpSw  - Activity: Bedbound  - Code Status: Full   Ms Laird is a 72 year old Female with a PMHx of CVA (non-verbal and bedbound), hypothyroidism, hypercholesterolemia bought to the ED from home by sister due to abd pain/ fever and labored breathing. When EMS arrived on scene, pt. was hypoxic at 86% on RA and altered. Pt. was placed in NRB 15L and O2 sat improved to 96%. Pt's sister states pt was exposed to a sick caregiver and starting 3 days ago pt has been noted to have nasal congestion and non productive cough.    #Sepsis 2/2 Pneumonia  #Stercoral Colitis  - CT Chest/Abd/Pelvis showed Bilateral patchy airspace opacities with left lower lobe consolidation consistent with pneumonia in the appropriate clinical setting. Debris or secretions within trachea, with occlusion of small left lower lobe peripheral airway; correlate for aspiration. Large rectal fecal stool burden, 11 cm AP. Contrast, presumably excreted, within vagina, suspicious for vesicovaginal fistula. Findings suggestive of mild colitis involving segment of descending colon; no abscess.  - Febrile 102; Tachycardic on admission  - Pt may have aspirated; NPO for now; SpSw eval; Pt on Pureed Diet at home  - Procal  - f/u Lactate  - RVP, Legionella/strep urine antigen   - Nebs prn sob/wheezing  - Incentive spirometry  - NC O2 prn sob, hypoxia with goal >92%  - Sputum culture/induction  - IV antibiotics - Cefepime and Flagyl  - ID consult  - UA/Urine culture - pt already received abx  - Bowel Regimen    #Hypercholesteremia  - c/w statin    #Hypothyroidism  - Med rec obtained from sister; Said she is not on Levothyroxine  - TSH; may not be accurate in the setting of critical illness    #Misc  - DVT Prophylaxis: Lovenox  - GI Prophylaxis: None needed  - Diet: NPO pending SpSw  - Activity: Bedbound  - Code Status: Full

## 2023-08-14 NOTE — CONSULT NOTE ADULT - SUBJECTIVE AND OBJECTIVE BOX
Consultation Requested by:    Patient is a 72y old  Female who presents with a chief complaint of fever, shortness of breath  HPI:  Ms Laird is a 72 year old Female with a PMHx of CVA (non-verbal and bedbound), hypothyroidism, hypercholesterolemia bought to the ED from home by sister due to abd pain/ fever and labored breathing. When EMS arrived on scene, pt. was hypoxic at 86% on RA and altered. Pt. was placed in NRB 15L and O2 sat improved to 96%. Pt's sister states pt was exposed to a sick caregiver and starting 3 days ago pt has been noted to have nasal congestion and non productive cough. Of note sister states pt was RUMC due to diarrhea/ constipation and discharged yesterday.     Vital Signs Last 12 Hrs  T(F): 100.3 (23 @ 01:45), Max: 102.2 (23 @ 00:15)  HR: 100 (23 @ 01:45) (100 - 140)  BP: 134/66 (23 @ 01:45) (104/62 - 134/66)  RR: 20 (23 @ 01:45) (20 - 20)  SpO2: 97% (23 @ 01:45) (96% - 98%)    Labs in the ED are significant for Lactate of 3.6 -> 2.9; VBG pCO2 52 and HCO3 30.     CT Chest/Abd/Pelvis showed Bilateral patchy airspace opacities with left lower lobe consolidation consistent with pneumonia in the appropriate clinical setting. Debris or secretions within trachea, with occlusion of small left lower lobe peripheral airway; correlate for aspiration. No evidence of acute pulmonary embolism. Large rectal fecal stool burden, 11 cm AP. Contrast, presumably excreted, within vagina, suspicious for vesicovaginal fistula. Findings suggestive of mild colitis involving segment of descending colon; no abscess. (14 Aug 2023 04:04)      REVIEW OF SYSTEMS    [x] Unable to obtain (explain): patient non-verbal    Recent Ill Contacts:	[] No	[x] Yes: patient's caregiver   Recent Travel History:	[x] No	[] Yes:  Recent Animal/Insect Exposure/Tick Bites:	x[] No	[] Yes:    Allergies    No Known Allergies    Intolerances      Antimicrobials:  cefepime   IVPB 2000 milliGRAM(s) IV Intermittent every 8 hours  metroNIDAZOLE  IVPB 500 milliGRAM(s) IV Intermittent every 8 hours      Other Medications:  atorvastatin 20 milliGRAM(s) Oral at bedtime  bisacodyl Suppository 10 milliGRAM(s) Rectal daily PRN  enoxaparin Injectable 40 milliGRAM(s) SubCutaneous every 24 hours  lactated ringers. 1000 milliLiter(s) IV Continuous <Continuous>      FAMILY HISTORY:    PAST MEDICAL & SURGICAL HISTORY:  Stroke      Hypothyroidism      Hypercholesteremia        SOCIAL HISTORY:      Daily Height in cm: 149.86 (14 Aug 2023 12:31)    Daily Weight in k (14 Aug 2023 12:31)  Head Circumference:  Vital Signs Last 24 Hrs  T(C): 35.8 (14 Aug 2023 11:30), Max: 39 (14 Aug 2023 00:15)  T(F): 96.5 (14 Aug 2023 11:30), Max: 102.2 (14 Aug 2023 00:15)  HR: 88 (14 Aug 2023 11:30) (88 - 140)  BP: 137/61 (14 Aug 2023 11:30) (104/62 - 139/73)  BP(mean): --  RR: 18 (14 Aug 2023 07:53) (18 - 20)  SpO2: 98% (14 Aug 2023 11:30) (94% - 98%)    Parameters below as of 14 Aug 2023 11:30  Patient On (Oxygen Delivery Method): nasal cannula  O2 Flow (L/min): 2      PHYSICAL EXAM  All physical exam findings normal, except for those marked:  General:	Normal: alert, no acute distress  Cardiovascular	Normal: regular rate and variability; Normal S1, S2; No murmur  Respiratory	Normal: no wheezing or crackles, bilateral audible breath sound. On 3L NC  Abdominal	Normal: soft; non-distended; non-tender; no hepatosplenomegaly or masses  Extremities	Normal: no cyanosis or edema, symmetric pulses  Skin		Normal: skin intact and not indurated; no rash, no desquamation    Respiratory Support:		[] No	[x] Yes: 3L NC  Vasoactive medication infusion:	[x] No	[] Yes:  Venous catheters:		[] No	[x] Yes:  Bladder catheter:		[x] No	[] Yes:  Other catheters or tubes:	[x] No	[] Yes:    Lab Results:                        12.3   7.43  )-----------( 168      ( 14 Aug 2023 00:25 )             37.7     08-14    138  |  102  |  15  ----------------------------<  261<H>  3.7   |  23  |  0.7    Ca    8.8      14 Aug 2023 00:25    TPro  6.1  /  Alb  3.7  /  TBili  0.9  /  DBili  x   /  AST  27  /  ALT  19  /  AlkPhos  82  08-14    LIVER FUNCTIONS - ( 14 Aug 2023 00:25 )  Alb: 3.7 g/dL / Pro: 6.1 g/dL / ALK PHOS: 82 U/L / ALT: 19 U/L / AST: 27 U/L / GGT: x           PT/INR - ( 14 Aug 2023 00:25 )   PT: 12.40 sec;   INR: 1.08 ratio         PTT - ( 14 Aug 2023 00:25 )  PTT:29.6 sec  Urinalysis Basic - ( 14 Aug 2023 00:25 )    Color: x / Appearance: x / SG: x / pH: x  Gluc: 261 mg/dL / Ketone: x  / Bili: x / Urobili: x   Blood: x / Protein: x / Nitrite: x   Leuk Esterase: x / RBC: x / WBC x   Sq Epi: x / Non Sq Epi: x / Bacteria: x

## 2023-08-14 NOTE — ED PROVIDER NOTE - OBJECTIVE STATEMENT
72yoF, PMH of stroke ( sided deficit), hypothyroidism, hypercholesterolemia coming from home, brought in by sister due to abd pain. When EMS arrived, pt. was hypoxic 86% in RA and altered. Pt. was placed in NRB 15L and O2 sat improved to 96%. Pt's sister states pt was exposed to a sick caregiver and starting 3 days ago pt has been noted to have nasal congestion and non productive cough.     Allergies: NKA     Collateral: Pt. had diarrhea x 10 days, was given antidiarrheal medications. Pt. was constipated last night and went to [facility] ED for  constipation and discharged. Pt. presents w. diarrhea upon arrival.

## 2023-08-14 NOTE — ED PROVIDER NOTE - PROGRESS NOTE DETAILS
SULY.- Spoke to ICU fellow, requesting lactate and re eval to establish level of care KA.- Stepdown criteria met for pt, spoke to MAR, MAR requesting disimpaction prior to admitting pt. Suppository ordered at this time. SULY.- Spoke to ICU fellow, accepted for Stepdown.

## 2023-08-14 NOTE — PATIENT PROFILE ADULT - FALL HARM RISK - HARM RISK INTERVENTIONS
Assistance with ambulation/Assistance OOB with selected safe patient handling equipment/Communicate Risk of Fall with Harm to all staff/Discuss with provider need for PT consult/Monitor gait and stability/Provide patient with walking aids - walker, cane, crutches/Reinforce activity limits and safety measures with patient and family/Review medications for side effects contributing to fall risk/Sit up slowly, dangle for a short time, stand at bedside before walking/Tailored Fall Risk Interventions/Toileting schedule using arm’s reach rule for commode and bathroom/Visual Cue: Yellow wristband and red socks/Bed in lowest position, wheels locked, appropriate side rails in place/Call bell, personal items and telephone in reach/Instruct patient to call for assistance before getting out of bed or chair/Non-slip footwear when patient is out of bed/[place] to call system/Physically safe environment - no spills, clutter or unnecessary equipment/Purposeful Proactive Rounding/Room/bathroom lighting operational, light cord in reach

## 2023-08-14 NOTE — H&P ADULT - CONVERSATION DETAILS
I discussed with her HCP (sister) her diagnosis of pneumonia, she agreed with antibiotics therapy, also discussed her swallow problem, will keep her NPO, she is ok for NG tube feeding temporarily until she is able to eat again, Patient is DNR/DNI per her wishes.

## 2023-08-14 NOTE — H&P ADULT - NSHPLABSRESULTS_GEN_ALL_CORE
Vitals:  ============  T(F): 100.3 (14 Aug 2023 01:45), Max: 102.2 (14 Aug 2023 00:15)  HR: 100 (14 Aug 2023 01:45)  BP: 134/66 (14 Aug 2023 01:45)  RR: 20 (14 Aug 2023 01:45)  SpO2: 97% (14 Aug 2023 01:45) (96% - 98%)  temp max in last 48H T(F): , Max: 102.2 (08-14-23 @ 00:15)    =======================================================  Current Antibiotics:    Other medications:  glycerin Suppository - Adult 1 Suppository(s) Rectal Once      =======================================================  Labs:                        12.3   7.43  )-----------( 168      ( 14 Aug 2023 00:25 )             37.7     08-14    138  |  102  |  15  ----------------------------<  261<H>  3.7   |  23  |  0.7    Ca    8.8      14 Aug 2023 00:25    TPro  6.1  /  Alb  3.7  /  TBili  0.9  /  DBili  x   /  AST  27  /  ALT  19  /  AlkPhos  82  08-14      Creatinine: 0.7 mg/dL (08-14-23 @ 00:25)            WBC Count: 7.43 K/uL (08-14-23 @ 00:25)        Alkaline Phosphatase: 82 U/L (08-14-23 @ 00:25)  Alanine Aminotransferase (ALT/SGPT): 19 U/L (08-14-23 @ 00:25)  Aspartate Aminotransferase (AST/SGOT): 27 U/L (08-14-23 @ 00:25)  Bilirubin Total: 0.9 mg/dL (08-14-23 @ 00:25)

## 2023-08-14 NOTE — ED PROVIDER NOTE - ATTENDING CONTRIBUTION TO CARE
I have personally performed a history and physical exam on this patient and personally directed the management of the patient.  72yoF, PMH of stroke ( sided deficit), hypothyroidism, hypercholesterolemia coming from home, brought in by sister due to abd pain/ fever and labored breathing. When EMS arrived, pt. was hypoxic 86% in RA and altered. Pt. was placed in NRB 15L and O2 sat improved to 96%. Pt's sister states pt was exposed to a sick caregiver and starting 3 days ago pt has been noted to have nasal congestion and non productive cough. of note sister states pt was RUMSI due to diarrhea/ constipation and discharged yesterday.   here pt is febrile, tachycardic (initially to 140), lethargic has labored breathing.  CON: old woman, lethargic, HENMT: normocephalic, atraumatic, CV: tachycardic, s1d2, distal pulses intact, RESP: labored breathing, rhonchi, GI:  soft, nontender, no rebound, no guarding, SKIN: no wounds MSK: contractures none verbal  POCUS showed good EF, no blines, IVC collapsed  pt is septic, could be pneumonia vs intraabdominal infection  will send labs, 30cc/kg ivf, empric abx, ct chest for pe and AP for source of infection and reevalute response to therapy pt will be admited

## 2023-08-14 NOTE — PATIENT PROFILE ADULT - FALL HARM RISK - FACTORS
Impaired gait/IV and/or equipment tethered to patient/Medication side effects/Poor balance/Syncope/Weakness/Other

## 2023-08-14 NOTE — ED PROVIDER NOTE - CARE PLAN
1 Principal Discharge DX:	Sepsis   Principal Discharge DX:	Sepsis  Secondary Diagnosis:	Pneumonia, aspiration  Secondary Diagnosis:	Acute colitis

## 2023-08-14 NOTE — ED ADULT NURSE NOTE - NSFALLHARMRISKINTERV_ED_ALL_ED
Assistance OOB with selected safe patient handling equipment if applicable/Assistance with ambulation/Communicate risk of Fall with Harm to all staff, patient, and family/Monitor gait and stability/Monitor for mental status changes and reorient to person, place, and time, as needed/Move patient closer to nursing station/within visual sight of ED staff/Provide patient with walking aids/Provide visual cue: red socks, yellow wristband, yellow gown, etc/Reinforce activity limits and safety measures with patient and family/Toileting schedule using arm’s reach rule for commode and bathroom/Use of alarms - bed, stretcher, chair and/or video monitoring/Bed in lowest position, wheels locked, appropriate side rails in place/Call bell, personal items and telephone in reach/Instruct patient to call for assistance before getting out of bed/chair/stretcher/Non-slip footwear applied when patient is off stretcher/[place] to call system/Physically safe environment - no spills, clutter or unnecessary equipment/Purposeful Proactive Rounding/Room/bathroom lighting operational, light cord in reach

## 2023-08-14 NOTE — ED ADULT NURSE REASSESSMENT NOTE - NS ED NURSE REASSESS COMMENT FT1
Pt assessed. NAD at this time. VSS. Safety maintained. Will continue to monitor, round, assess. Initial (On Arrival)

## 2023-08-14 NOTE — PHARMACOTHERAPY INTERVENTION NOTE - COMMENTS
Asked patient's nurse to document weight in patient's chart, as there is no weight documented. Weight documented was 59 kg.

## 2023-08-14 NOTE — PHARMACOTHERAPY INTERVENTION NOTE - COMMENTS
Asked patient's nurse to document height in patient's chart, as there is no height documented. Height documented is 149.9 cm

## 2023-08-14 NOTE — SWALLOW BEDSIDE ASSESSMENT ADULT - SLP GENERAL OBSERVATIONS
pt received in ED stretcher asleep minimally arousable in no apparent pain. +contracted, leaning to R-side +3L NC +sister at bedside

## 2023-08-14 NOTE — H&P ADULT - NSHPPHYSICALEXAM_GEN_ALL_CORE
PHYSICAL EXAM:  GENERAL: Lethargic  HEAD:  Atraumatic, Normocephalic  NECK: Supple, No JVD  CHEST/LUNG: b/l diffuse crackles;   HEART: Tachycardic; No murmurs, rubs, or gallops  ABDOMEN: Bowel sounds present; Soft, Nontender, Nondistended. No hepatomegaly  EXTREMITIES:  NERVOUS SYSTEM:  Non-verbal  MSK: contracted extremities  Skin: No wounds

## 2023-08-14 NOTE — H&P ADULT - ATTENDING COMMENTS
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 was hypoxic at 86% on RA and altered. Pt. was placed in NRB 15L and O2 sat improved to 96%. Pt's sister states patient has congestion nose for the last few days with dry cough, in the ED T max 102.2F, , BP stable, labs showed lactate 3.6, CT chest showed left lower lobe consolidation,     PHYSICAL EXAM:  GENERAL: mild respiratory distress, bedbound, contracted, non-verbal.   HEAD:  Atraumatic, Normocephalic.  EYES: EOMI, PERRLA, conjunctiva and sclera clear.  NECK: Supple, No JVD.  CHEST/LUNG: Left lower lung rales.   HEART: Regular rate and rhythm; S1 S2.   ABDOMEN: Soft, Nontender, distended, BS hypoactive.   EXTREMITIES:  2+ Peripheral Pulses, No clubbing, cyanosis, or edema.  PSYCH: Awake, non-verbal,   NEUROLOGY: contracted, LUE contracted, moves her legs, withdraw legs for pain.   SKIN: No rashes or lesions.    A/P:   Acute Hypoxic respiratory failure:   Left lower lobe pneumonia: gram negative is suspected.   Sepsis on admission; fever, tachycardia.   Recent URI   Patient with fever, cough, SOB, hypoxia.   Ct chest showed left lower lobe consolidation. Debris or secretions within trachea, with occlusion of small left lower lobe peripheral airway; correlate for aspiration  WBC 10K, lactate 3.6>1.4  Speech and swallow, recommended NPO.   Start on Rocephin and Zithromax.   Send Nasal MRSA, urine Legionella, streptococcus Ag in urine,   continue Nasal canula,   recheck speech and swallow to see if swallow improves with IV antibiotics.   Aspiration precaution.   Continue IV fluid.     Constipation:   CT abdomen showed Large rectal fecal stool burden, 11 cm AP. Contrast, presumably excreted, within vagina, suspicious for vesicovaginal fistula. Findings suggestive of mild colitis involving segment of descending colon; no abscess.  Start on bowel regimen. Add Dulcolax supp.     History of CVA:   Functional quadriplegia:   Patient is bedbound  off loading,     CODE Status DNR/DNI

## 2023-08-14 NOTE — ED PROVIDER NOTE - CLINICAL SUMMARY MEDICAL DECISION MAKING FREE TEXT BOX
2yoF, PMH of stroke ( sided deficit), hypothyroidism, hypercholesterolemia coming from home, brought in by sister due to abd pain. When EMS arrived, pt. was hypoxic 86% in RA and altered. pt here is lethargic, tachycardic, febrile (BP stable) and has elevated LA to 3.6. Bilateral patchy airspace opacities with left lower lobe consolidation consistent with pneumonia, possible aspiration pneumonia, Large rectal fecal stool burden, 11 cm AP, possible rectovaginal fistula and mild colitis involving segment of descending colon. pt is septic, IVF given 30 cc/kg, empiric abx given and culture sent.  pt accepted in step down ICU.

## 2023-08-14 NOTE — ED PROVIDER NOTE - PHYSICAL EXAMINATION
VITAL SIGNS: I have reviewed nursing notes and confirm.  CONSTITUTIONAL: tachypnea, in mild respiratory distress. Pt. non verbal   SKIN: hot  HEAD: NCAT  EYES: EOMI, PERRLA, no scleral icteru  ENT: dry mucus membranes  CARD: tachycardic  RESP: rhales, mild exp wheezing  ABD: soft, non-tender, non-distended, no rebound or guarding.   EXT: pt. contracted

## 2023-08-14 NOTE — H&P ADULT - HISTORY OF PRESENT ILLNESS
Ms Laird is a 72 year old Female with a PMHx of CVA (non-verbal and bedbound), hypothyroidism, hypercholesterolemia bought to the ED from home by sister due to abd pain/ fever and labored breathing. When EMS arrived on scene, pt. was hypoxic at 86% on RA and altered. Pt. was placed in NRB 15L and O2 sat improved to 96%. Pt's sister states pt was exposed to a sick caregiver and starting 3 days ago pt has been noted to have nasal congestion and non productive cough. Of note sister states pt was RUMC due to diarrhea/ constipation and discharged yesterday.     Vital Signs Last 12 Hrs  T(F): 100.3 (08-14-23 @ 01:45), Max: 102.2 (08-14-23 @ 00:15)  HR: 100 (08-14-23 @ 01:45) (100 - 140)  BP: 134/66 (08-14-23 @ 01:45) (104/62 - 134/66)  RR: 20 (08-14-23 @ 01:45) (20 - 20)  SpO2: 97% (08-14-23 @ 01:45) (96% - 98%)    Labs in the ED are significant for Lactate of 3.6 -> 2.9; VBG pCO2 52 and HCO3 30.     CT Chest/Abd/Pelvis showed Bilateral patchy airspace opacities with left lower lobe consolidation consistent with pneumonia in the appropriate clinical setting. Debris or secretions within trachea, with occlusion of small left lower lobe peripheral airway; correlate for aspiration. No evidence of acute pulmonary embolism. Large rectal fecal stool burden, 11 cm AP. Contrast, presumably excreted, within vagina, suspicious for vesicovaginal fistula. Findings suggestive of mild colitis involving segment of descending colon; no abscess.

## 2023-08-14 NOTE — CONSULT NOTE ADULT - ATTENDING COMMENTS
Patient is a 72 year old Female with a PMHx of CVA (non-verbal and bedbound), hypothyroidism, hypercholesterolemia bought to the ED from home by sister due to abd pain/ fever and labored breathing. Patient septic on admission and found to have pneumonia with possible aspiration.    #Severe sepsis present on admission (Fever, HR>90 with lactic acidosis)   - CT Chest/Abd/Pelvis showed Bilateral patchy airspace opacitiesh left lower lobe consolidation consistent with pneumonia in the appropriate clinical setting. Debris or secretions within trachea, with occlusion of small left lower lobe peripheral airway; correlate for aspiration. Large rectal fecal stool burden, 11 cm AP. Contrast, presumably excreted, within vagina, suspicious for vesicovaginal fistula. Findings suggestive of mild colitis involving segment of descending colon; no abscess.    #Colitis - possible stercoral colitis with fecal burden     Plan:  - continue cefepime 2g q 12 hours   - follow-up procalcitonin   - ensure bowel movements  - follow-up blood cx   - trend fever curve     Please call or message on [employer] Teams if with any questions.  Spectra [phone]

## 2023-08-14 NOTE — CONSULT NOTE ADULT - SUBJECTIVE AND OBJECTIVE BOX
Patient is a 72y old  Female who presents with a chief complaint of sob    72 year old Female with a PMHx of CVA (non-verbal and bedbound), hypothyroidism, hypercholesterolemia bought to the ED from home by sister due to abd pain/ fever and labored breathing. When EMS arrived on scene, pt. was hypoxic at 86% on RA and altered. Pt. was placed in NRB 15L and O2 sat improved to 96%. Pt's sister states pt was exposed to a sick caregiver and starting 3 days ago pt has been noted to have nasal congestion and non productive cough. Of note sister states pt was RUMC due to diarrhea/ constipation and discharged yesterday.     Vital Signs Last 12 Hrs  T(F): 100.3 (08-14-23 @ 01:45), Max: 102.2 (08-14-23 @ 00:15)  HR: 100 (08-14-23 @ 01:45) (100 - 140)  BP: 134/66 (08-14-23 @ 01:45) (104/62 - 134/66)  RR: 20 (08-14-23 @ 01:45) (20 - 20)  SpO2: 97% (08-14-23 @ 01:45) (96% - 98%)    Labs in the ED are significant for Lactate of 3.6 -> 2.9; VBG pCO2 52 and HCO3 30.     CT Chest/Abd/Pelvis showed Bilateral patchy airspace opacities with left lower lobe consolidation consistent with pneumonia in the appropriate clinical setting. Debris or secretions within trachea, with occlusion of small left lower lobe peripheral airway; correlate for aspiration. No evidence of acute pulmonary embolism. Large rectal fecal stool burden, 11 cm AP. Contrast, presumably excreted, within vagina, suspicious for vesicovaginal fistula. Findings suggestive of mild colitis involving segment of descending colon; no abscess. (14 Aug 2023 04:04)  admitted to SDU, called to evaluate, this am on NC      PAST MEDICAL & SURGICAL HISTORY:  Stroke      Hypothyroidism      Hypercholesteremia          SOCIAL HX:   Smoking   UTO    FAMILY HISTORY: UTO      REVIEW OF SYSTEMS see hpi    Allergies    No Known Allergies    Intolerances        atorvastatin 20 milliGRAM(s) Oral at bedtime  bisacodyl Suppository 10 milliGRAM(s) Rectal daily PRN  cefepime   IVPB 2000 milliGRAM(s) IV Intermittent every 8 hours  enoxaparin Injectable 40 milliGRAM(s) SubCutaneous every 24 hours  memantine 10 milliGRAM(s) Oral Once  metroNIDAZOLE  IVPB 500 milliGRAM(s) IV Intermittent every 8 hours  : Home Meds:      PHYSICAL EXAM    ICU Vital Signs Last 24 Hrs  T(C): 37.9 (14 Aug 2023 01:45), Max: 39 (14 Aug 2023 00:15)  T(F): 100.3 (14 Aug 2023 01:45), Max: 102.2 (14 Aug 2023 00:15)  HR: 100 (14 Aug 2023 05:26) (100 - 140)  BP: 139/73 (14 Aug 2023 05:26) (104/62 - 139/73)  RR: 18 (14 Aug 2023 05:26) (18 - 20)  SpO2: 97% (14 Aug 2023 05:26) (96% - 98%)    O2 Parameters below as of 14 Aug 2023 05:26  Patient On (Oxygen Delivery Method): nasal cannula  O2 Flow (L/min): 3          General: ill looking  Lungs: Bilateral rhonchi  Cardiovascular: Regular  Abdomen: Soft, Positive BS  Extremities: No clubbing  r side hemiplegia/ RUE contracted    LABS:                          12.3   7.43  )-----------( 168      ( 14 Aug 2023 00:25 )             37.7                                               08-14    138  |  102  |  15  ----------------------------<  261<H>  3.7   |  23  |  0.7    Ca    8.8      14 Aug 2023 00:25    TPro  6.1  /  Alb  3.7  /  TBili  0.9  /  DBili  x   /  AST  27  /  ALT  19  /  AlkPhos  82  08-14      PT/INR - ( 14 Aug 2023 00:25 )   PT: 12.40 sec;   INR: 1.08 ratio         PTT - ( 14 Aug 2023 00:25 )  PTT:29.6 sec                                       Urinalysis Basic - ( 14 Aug 2023 00:25 )    Color: x / Appearance: x / SG: x / pH: x  Gluc: 261 mg/dL / Ketone: x  / Bili: x / Urobili: x   Blood: x / Protein: x / Nitrite: x   Leuk Esterase: x / RBC: x / WBC x   Sq Epi: x / Non Sq Epi: x / Bacteria: x        CARDIAC MARKERS ( 14 Aug 2023 00:25 )  x     / <0.01 ng/mL / x     / x     / x                                                LIVER FUNCTIONS - ( 14 Aug 2023 00:25 )  Alb: 3.7 g/dL / Pro: 6.1 g/dL / ALK PHOS: 82 U/L / ALT: 19 U/L / AST: 27 U/L / GGT: x                                                                                                                  MEDICATIONS  (STANDING):  atorvastatin 20 milliGRAM(s) Oral at bedtime  cefepime   IVPB 2000 milliGRAM(s) IV Intermittent every 8 hours  enoxaparin Injectable 40 milliGRAM(s) SubCutaneous every 24 hours  memantine 10 milliGRAM(s) Oral Once  metroNIDAZOLE  IVPB 500 milliGRAM(s) IV Intermittent every 8 hours    MEDICATIONS  (PRN):  bisacodyl Suppository 10 milliGRAM(s) Rectal daily PRN Constipation        CT noted

## 2023-08-15 LAB
ANION GAP SERPL CALC-SCNC: 12 MMOL/L — SIGNIFICANT CHANGE UP (ref 7–14)
BUN SERPL-MCNC: 16 MG/DL — SIGNIFICANT CHANGE UP (ref 10–20)
CALCIUM SERPL-MCNC: 9 MG/DL — SIGNIFICANT CHANGE UP (ref 8.4–10.5)
CHLORIDE SERPL-SCNC: 105 MMOL/L — SIGNIFICANT CHANGE UP (ref 98–110)
CO2 SERPL-SCNC: 25 MMOL/L — SIGNIFICANT CHANGE UP (ref 17–32)
CREAT SERPL-MCNC: 0.5 MG/DL — LOW (ref 0.7–1.5)
CULTURE RESULTS: SIGNIFICANT CHANGE UP
EGFR: 99 ML/MIN/1.73M2 — SIGNIFICANT CHANGE UP
GLUCOSE SERPL-MCNC: 89 MG/DL — SIGNIFICANT CHANGE UP (ref 70–99)
HCT VFR BLD CALC: 35.9 % — LOW (ref 37–47)
HGB BLD-MCNC: 11.9 G/DL — LOW (ref 12–16)
MAGNESIUM SERPL-MCNC: 1.7 MG/DL — LOW (ref 1.8–2.4)
MCHC RBC-ENTMCNC: 30.7 PG — SIGNIFICANT CHANGE UP (ref 27–31)
MCHC RBC-ENTMCNC: 33.1 G/DL — SIGNIFICANT CHANGE UP (ref 32–37)
MCV RBC AUTO: 92.8 FL — SIGNIFICANT CHANGE UP (ref 81–99)
NRBC # BLD: 0 /100 WBCS — SIGNIFICANT CHANGE UP (ref 0–0)
PLATELET # BLD AUTO: 158 K/UL — SIGNIFICANT CHANGE UP (ref 130–400)
PMV BLD: 11.2 FL — HIGH (ref 7.4–10.4)
POTASSIUM SERPL-MCNC: 3.5 MMOL/L — SIGNIFICANT CHANGE UP (ref 3.5–5)
POTASSIUM SERPL-SCNC: 3.5 MMOL/L — SIGNIFICANT CHANGE UP (ref 3.5–5)
PROCALCITONIN SERPL-MCNC: 1.38 NG/ML — HIGH (ref 0.02–0.1)
RBC # BLD: 3.87 M/UL — LOW (ref 4.2–5.4)
RBC # FLD: 12.8 % — SIGNIFICANT CHANGE UP (ref 11.5–14.5)
SODIUM SERPL-SCNC: 142 MMOL/L — SIGNIFICANT CHANGE UP (ref 135–146)
SPECIMEN SOURCE: SIGNIFICANT CHANGE UP
WBC # BLD: 8.37 K/UL — SIGNIFICANT CHANGE UP (ref 4.8–10.8)
WBC # FLD AUTO: 8.37 K/UL — SIGNIFICANT CHANGE UP (ref 4.8–10.8)

## 2023-08-15 PROCEDURE — 71045 X-RAY EXAM CHEST 1 VIEW: CPT | Mod: 26

## 2023-08-15 PROCEDURE — 74018 RADEX ABDOMEN 1 VIEW: CPT | Mod: 26

## 2023-08-15 PROCEDURE — 99233 SBSQ HOSP IP/OBS HIGH 50: CPT

## 2023-08-15 RX ORDER — CHLORHEXIDINE GLUCONATE 213 G/1000ML
1 SOLUTION TOPICAL
Refills: 0 | Status: DISCONTINUED | OUTPATIENT
Start: 2023-08-15 | End: 2023-08-25

## 2023-08-15 RX ORDER — MAGNESIUM SULFATE 500 MG/ML
2 VIAL (ML) INJECTION ONCE
Refills: 0 | Status: COMPLETED | OUTPATIENT
Start: 2023-08-15 | End: 2023-08-15

## 2023-08-15 RX ORDER — SENNA PLUS 8.6 MG/1
2 TABLET ORAL AT BEDTIME
Refills: 0 | Status: DISCONTINUED | OUTPATIENT
Start: 2023-08-15 | End: 2023-08-25

## 2023-08-15 RX ORDER — ACETAMINOPHEN 500 MG
650 TABLET ORAL EVERY 6 HOURS
Refills: 0 | Status: DISCONTINUED | OUTPATIENT
Start: 2023-08-15 | End: 2023-08-25

## 2023-08-15 RX ORDER — POLYETHYLENE GLYCOL 3350 17 G/17G
17 POWDER, FOR SOLUTION ORAL EVERY 12 HOURS
Refills: 0 | Status: DISCONTINUED | OUTPATIENT
Start: 2023-08-15 | End: 2023-08-25

## 2023-08-15 RX ADMIN — ATORVASTATIN CALCIUM 20 MILLIGRAM(S): 80 TABLET, FILM COATED ORAL at 21:38

## 2023-08-15 RX ADMIN — CEFEPIME 100 MILLIGRAM(S): 1 INJECTION, POWDER, FOR SOLUTION INTRAMUSCULAR; INTRAVENOUS at 06:52

## 2023-08-15 RX ADMIN — Medication 650 MILLIGRAM(S): at 15:04

## 2023-08-15 RX ADMIN — Medication 25 GRAM(S): at 10:33

## 2023-08-15 RX ADMIN — POLYETHYLENE GLYCOL 3350 17 GRAM(S): 17 POWDER, FOR SOLUTION ORAL at 17:04

## 2023-08-15 RX ADMIN — ENOXAPARIN SODIUM 40 MILLIGRAM(S): 100 INJECTION SUBCUTANEOUS at 21:38

## 2023-08-15 RX ADMIN — SENNA PLUS 2 TABLET(S): 8.6 TABLET ORAL at 21:38

## 2023-08-15 RX ADMIN — CHLORHEXIDINE GLUCONATE 1 APPLICATION(S): 213 SOLUTION TOPICAL at 06:52

## 2023-08-15 RX ADMIN — CEFEPIME 100 MILLIGRAM(S): 1 INJECTION, POWDER, FOR SOLUTION INTRAMUSCULAR; INTRAVENOUS at 17:03

## 2023-08-15 NOTE — SWALLOW BEDSIDE ASSESSMENT ADULT - SLP GENERAL OBSERVATIONS
pt received in bed asleep arousable in no apparent pain. +contracted +sister at bedside pt received in bed asleep arousable in no apparent pain. +contracted +NGT in place +sister at bedside

## 2023-08-15 NOTE — PROGRESS NOTE ADULT - SUBJECTIVE AND OBJECTIVE BOX
Over Night Events : events noted, on RA, ID reviewed, fever    PHYSICAL EXAM    ICU Vital Signs Last 24 Hrs  T(C): 36.2 (15 Aug 2023 04:00), Max: 38.9 (14 Aug 2023 21:25)  T(F): 97.1 (15 Aug 2023 04:00), Max: 102.1 (14 Aug 2023 21:25)  HR: 85 (15 Aug 2023 04:00) (85 - 125)  BP: 146/74 (15 Aug 2023 04:00) (129/59 - 173/86)  RR: 19 (15 Aug 2023 04:00) (18 - 19)  SpO2: 95% (15 Aug 2023 04:00) (94% - 98%)    O2 Parameters below as of 14 Aug 2023 21:25  Patient On (Oxygen Delivery Method): room air            General: ill looking  Lungs: Bilateral rhonchi  Cardiovascular: FRANCISCO JAVIER 2.6  Abdomen: Soft, Positive BS  contracted RUE  Not following commands      08-14-23 @ 07:01  -  08-15-23 @ 06:35  --------------------------------------------------------  IN:  Total IN: 0 mL    OUT:    Stool (mL): 1 mL    Voided (mL): 1 mL  Total OUT: 2 mL    Total NET: -2 mL          LABS:                          11.9   8.37  )-----------( 158      ( 15 Aug 2023 05:08 )             35.9                                               08-14    141  |  104  |  14  ----------------------------<  119<H>  4.0   |  25  |  0.5<L>    Ca    9.0      14 Aug 2023 11:35  Mg     1.9     08-14    TPro  6.0  /  Alb  3.7  /  TBili  1.1  /  DBili  x   /  AST  27  /  ALT  20  /  AlkPhos  77  08-14      PT/INR - ( 14 Aug 2023 00:25 )   PT: 12.40 sec;   INR: 1.08 ratio         PTT - ( 14 Aug 2023 00:25 )  PTT:29.6 sec                                       Urinalysis Basic - ( 14 Aug 2023 15:20 )    Color: Dark Yellow / Appearance: Cloudy / SG: >1.030 / pH: x  Gluc: x / Ketone: Trace mg/dL  / Bili: Negative / Urobili: 0.2 mg/dL   Blood: x / Protein: 100 mg/dL / Nitrite: Negative   Leuk Esterase: Trace / RBC: 7 /HPF / WBC 2 /HPF   Sq Epi: x / Non Sq Epi: 12 /HPF / Bacteria: Few /HPF        CARDIAC MARKERS ( 14 Aug 2023 00:25 )  x     / <0.01 ng/mL / x     / x     / x                                                LIVER FUNCTIONS - ( 14 Aug 2023 11:35 )  Alb: 3.7 g/dL / Pro: 6.0 g/dL / ALK PHOS: 77 U/L / ALT: 20 U/L / AST: 27 U/L / GGT: x                                                                                                                                       MEDICATIONS  (STANDING):  atorvastatin 20 milliGRAM(s) Oral at bedtime  cefepime   IVPB 2000 milliGRAM(s) IV Intermittent every 12 hours  chlorhexidine 2% Cloths 1 Application(s) Topical <User Schedule>  enoxaparin Injectable 40 milliGRAM(s) SubCutaneous every 24 hours  lactated ringers. 1000 milliLiter(s) (50 mL/Hr) IV Continuous <Continuous>    MEDICATIONS  (PRN):  bisacodyl Suppository 10 milliGRAM(s) Rectal daily PRN Constipation

## 2023-08-15 NOTE — DIETITIAN INITIAL EVALUATION ADULT - NS FNS DIET ORDER
Diet, NPO with Tube Feed:   Tube Feeding Modality: Nasogastric  Jevity 1.2 Pepito  Total Volume for 24 Hours (mL): 1050  Bolus  Total Volume of Bolus (mL):  350  Total # of Feeds: 3  Tube Feed Frequency: Every 8 hours   Tube Feed Start Time: 13:00  Bolus Feed Rate (mL per Hour): 500   Bolus Feed Duration (in Hours): 0.75 (08-15-23 @ 12:30)

## 2023-08-15 NOTE — DIETITIAN INITIAL EVALUATION ADULT - OTHER CALCULATIONS
Energy: 1207 - 1308 kcal/day (MSJ x 1.2 - 1.3) vs 1475 - 1700 kcal/day (25-30 kcal/kg)   estimated needs with consideration for age, weight, overweight BMI

## 2023-08-15 NOTE — CHART NOTE - NSCHARTNOTEFT_GEN_A_CORE
ICU Transfer Note    Transfer from: ICU    Transfer to: (  ) Medicine    (  ) Telemetry    (  ) RCU                               (  ) Palliative    (  ) Stroke Unit    (  ) MICU    (  ) __________________    Accepting Physician:    Signout given to:     HPI / ICU COURSE:          Vital Signs Last 24 Hrs  T(C): 37.1 (15 Aug 2023 11:00), Max: 38.9 (14 Aug 2023 21:25)  T(F): 98.8 (15 Aug 2023 11:00), Max: 102.1 (14 Aug 2023 21:25)  HR: 98 (15 Aug 2023 11:00) (79 - 125)  BP: 143/71 (15 Aug 2023 11:00) (130/66 - 173/86)  BP(mean): 99 (15 Aug 2023 11:00) (99 - 110)  RR: 20 (15 Aug 2023 11:) (18 - 20)  SpO2: 95% (15 Aug 2023 11:00) (95% - 96%)    Parameters below as of 15 Aug 2023 11:00  Patient On (Oxygen Delivery Method): room air        I&O's Summary    14 Aug 2023 07:01  -  15 Aug 2023 07:00  --------------------------------------------------------  IN: 0 mL / OUT: 2 mL / NET: -2 mL    15 Aug 2023 07:01  -  15 Aug 2023 13:47  --------------------------------------------------------  IN: 0 mL / OUT: 1 mL / NET: -1 mL        Physical Exam:       LABS:   CARDIAC MARKERS ( 14 Aug 2023 00:25 )  x     / <0.01 ng/mL / x     / x     / x                                  11.9   8.37  )-----------( 158      ( 15 Aug 2023 05:08 )             35.9       08-15    142  |  105  |  16  ----------------------------<  89  3.5   |  25  |  0.5<L>    Ca    9.0      15 Aug 2023 05:08  Mg     1.7     08-15    TPro  6.0  /  Alb  3.7  /  TBili  1.1  /  DBili  x   /  AST  27  /  ALT  20  /  AlkPhos  77  08-14      PT/INR - ( 14 Aug 2023 00:25 )   PT: 12.40 sec;   INR: 1.08 ratio         PTT - ( 14 Aug 2023 00:25 )  PTT:29.6 sec          EC Lead ECG:   Ventricular Rate 112 BPM    Atrial Rate 112 BPM    P-R Interval 162 ms    QRS Duration 60 ms    Q-T Interval 310 ms    QTC Calculation(Bazett) 423 ms    P Axis 65 degrees    R Axis 68 degrees    T Axis 44 degrees    Diagnosis Line Sinus tachycardia  Low voltage QRS  Septal infarct , age undetermined  Abnormal ECG    Confirmed by LOUIS NOVA MD ([id number]) on 8/15/2023 7:36:14 AM (23 @ 02:50)    Telemetry:    Imaging:      ASSESSMENT & PLAN:           FOR FOLLOW UP:  [ ]   [ ]   [ ]       Nicol Gong  PGY-1 SDU Transfer Note    Transfer from: SDU    Transfer to: ( x ) Medicine       FOLLOW UP:    IV Cefepime per ID    Blood cx  Urine strep/legionella  Aspiration precaution   ID recs  enemas, bowel movement--> disimpaction if fails  Bowel regimen  Daily KUBs    HPI / SDU COURSE:    Ms Laird is a 72 year old Female with a PMHx of CVA (non-verbal and bedbound), hypothyroidism, hypercholesterolemia bought to the ED from home by sister due to abd pain/ fever and labored breathing. When EMS arrived on scene, pt. was hypoxic at 86% on RA and altered. Pt. was placed in NRB 15L and O2 sat improved to 96%. Pt's sister states pt was exposed to a sick caregiver and starting 3 days ago pt has been noted to have nasal congestion and non productive cough. Of note sister states pt was RUMC due to diarrhea/ constipation and discharged yesterday.     Vital Signs Last 12 Hrs  T(F): 100.3 (23 @ 01:45), Max: 102.2 (23 @ 00:15)  HR: 100 (23 @ 01:45) (100 - 140)  BP: 134/66 (23 @ 01:45) (104/62 - 134/66)  RR: 20 (23 @ 01:45) (20 - 20)  SpO2: 97% (23 @ 01:45) (96% - 98%)    Labs in the ED are significant for Lactate of 3.6 -> 2.9; VBG pCO2 52 and HCO3 30.     CT Chest/Abd/Pelvis: Bilateral patchy airspace opacities with left lower lobe consolidation consistent with pneumonia in the appropriate clinical setting. Debris or secretions within trachea, with occlusion of small left lower lobe peripheral airway; correlate for aspiration. No evidence of acute pulmonary embolism. Large rectal fecal stool burden, 11 cm AP. Contrast, presumably excreted, within vagina, suspicious for vesicovaginal fistula. Findings suggestive of mild colitis involving segment of descending colon; no abscess.    In the SDU, pt was NPO per S/S assessment. She had fever overnight for which she was given IV tylenol. She is on RA, SpO2 of 95%. She is nonverbal, contracted, had crackles on B/L lung fields. She is urinating but has not stooled. Currently she is receiving enemas 2 hourly and feeding through NG tube. She might need disimpaction if enema fails. She is hemodynamically stable and is for a downgrade.     Vital Signs Last 24 Hrs  T(C): 37.1 (15 Aug 2023 11:00), Max: 38.9 (14 Aug 2023 21:25)  T(F): 98.8 (15 Aug 2023 11:00), Max: 102.1 (14 Aug 2023 21:25)  HR: 98 (15 Aug 2023 11:) (79 - 125)  BP: 143/71 (15 Aug 2023 11:) (130/66 - 173/86)  BP(mean): 99 (15 Aug 2023 11:) (99 - 110)  RR: 20 (15 Aug 2023 11:) (18 - 20)  SpO2: 95% (15 Aug 2023 11:) (95% - 96%)    Parameters below as of 15 Aug 2023 11:00  Patient On (Oxygen Delivery Method): room air    I&O's Summary    14 Aug 2023 07:01  -  15 Aug 2023 07:00  --------------------------------------------------------  IN: 0 mL / OUT: 2 mL / NET: -2 mL    15 Aug 2023 07:01  -  15 Aug 2023 13:47  --------------------------------------------------------  IN: 0 mL / OUT: 1 mL / NET: -1 mL    LABS:   CARDIAC MARKERS ( 14 Aug 2023 00:25 )  x     / <0.01 ng/mL / x     / x     / x                            11.9   8.37  )-----------( 158      ( 15 Aug 2023 05:08 )             35.9       08-15    142  |  105  |  16  ----------------------------<  89  3.5   |  25  |  0.5<L>    Ca    9.0      15 Aug 2023 05:08  Mg     1.7     -15    TPro  6.0  /  Alb  3.7  /  TBili  1.1  /  DBili  x   /  AST  27  /  ALT  20  /  AlkPhos  77  08-      PT/INR - ( 14 Aug 2023 00:25 )   PT: 12.40 sec;   INR: 1.08 ratio         PTT - ( 14 Aug 2023 00:25 )  PTT:29.6 sec    EC Lead ECG:   Ventricular Rate 112 BPM    Atrial Rate 112 BPM    P-R Interval 162 ms    QRS Duration 60 ms    Q-T Interval 310 ms    QTC Calculation(Bazett) 423 ms    P Axis 65 degrees    R Axis 68 degrees    T Axis 44 degrees    Diagnosis Line Sinus tachycardia  Low voltage QRS  Septal infarct , age undetermined  Abnormal ECG    Confirmed by LOUIS NOVA MD [id number]) on 8/15/2023 7:36:14 AM (23 @ 02:50)    ASSESSMENT & PLAN:     72 year old Female with a PMH of CVA (non-verbal and bedbound) with residual upper and LE weakness, bed bound,  hypothyroidism, hypercholesterolemia bought to the ED from home by sister for SOB and fever, When EMS arrived on scene, pt. was hypoxic at 86% on RA and altered. Pt. was placed in NRB 15L and O2 sat improved to 96%. Pt's sister states patient has congestion nose for the last few days with dry cough, in the ED T max 102.2F, , BP stable, labs showed lactate 3.6, CT chest showed left lower lobe consolidation, Admitted to SDU for closer monitoring.    #Acute Hypoxic respiratory failure:   #Left lower lobe pneumonia: gram negative v aspiration  #Severe Sepsis on admission; fever, tachycardia.   #Recent URI   Ct chest showed left lower lobe consolidation. Debris or secretions within trachea, with occlusion of small left lower lobe peripheral airway; correlate for aspiration  WBC 10K, lactate 3.6--->1.4  Speech and swallow, recommended NPO. Family agreeable to NGT   Started on Cefepime per ID,    procal  1.4  blood cx NGTD,   check urine strep/legionella  Aspiration precaution.   ID following     #Constipation  #Stercoral colitis   CT abdomen showed Large rectal fecal stool burden, 11 cm AP. Contrast, presumably excreted, within vagina, suspicious for vesicovaginal fistula. Findings suggestive of mild colitis involving segment of descending colon; no abscess.  unable to manually disimpact today  monitoring for BM  start tap water enemas q2H x4 today. once NGT placed, start Senna, Miralax q8H with daily bisacodyl supp  daily KUB     History of CVA:   Functional quadriplegia:   Patient is bedbound  off loading    CODE Status DNR/DNI, overall very poor prognosis

## 2023-08-15 NOTE — PROGRESS NOTE ADULT - ASSESSMENT
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 was hypoxic at 86% on RA and altered. Pt. was placed in NRB 15L and O2 sat improved to 96%. Pt's sister states patient has congestion nose for the last few days with dry cough, in the ED T max 102.2F, , BP stable, labs showed lactate 3.6, CT chest showed left lower lobe consolidation, Admitted to SDU for closer monitoring     Acute Hypoxic respiratory failure:   Left lower lobe pneumonia: gram negative v aspiration  Severe Sepsis on admission; fever, tachycardia.   Recent URI   Ct chest showed left lower lobe consolidation. Debris or secretions within trachea, with occlusion of small left lower lobe peripheral airway; correlate for aspiration  WBC 10K, lactate 3.6>1.4  Speech and swallow, recommended NPO. Family agreeable to NGT   Started  on Cefepime per ID,  procal  1.4  blood cx NGTD, check urine strep/legionella  Aspiration precaution.   ID following     Constipation  Stercoral colitis   CT abdomen showed Large rectal fecal stool burden, 11 cm AP. Contrast, presumably excreted, within vagina, suspicious for vesicovaginal fistula. Findings suggestive of mild colitis involving segment of descending colon; no abscess.  unable to manually disimpact today  start tap water enemas q2H x4 today. once NGT placed, start Senna, Miralax q8H with daily bisacodyl supp  daily KUB     History of CVA:   Functional quadriplegia:   Patient is bedbound  off loading,     CODE Status DNR/DNI, overall very poor prognosis

## 2023-08-15 NOTE — PROGRESS NOTE ADULT - SUBJECTIVE AND OBJECTIVE BOX
ANNA COBOS  72y Female    CHIEF COMPLAINT:    Patient is a 72y old  Female who presents with a chief complaint of sob (15 Aug 2023 06:35)    INTERVAL HPI/OVERNIGHT EVENTS:    Patient seen and examined. No acute events overnight. Off pressors, on room air     ROS: All other systems are negative.    Vital Signs:    T(F): 99.5 (08-15-23 @ 07:18), Max: 102.1 (23 @ 21:25)  HR: 79 (08-15-23 @ 07:18) (79 - 125)  BP: 152/77 (08-15-23 @ 07:18) (130/66 - 173/86)  RR: 20 (08-15-23 @ 07:18) (18 - 20)  SpO2: 96% (08-15-23 @ 07:18) (95% - 98%)    14 Aug 2023 07:01  -  15 Aug 2023 07:00  --------------------------------------------------------  IN: 0 mL / OUT: 2 mL / NET: -2 mL    15 Aug 2023 07:01  -  15 Aug 2023 11:21  --------------------------------------------------------  IN: 0 mL / OUT: 1 mL / NET: -1 mL    Daily Height in cm: 149.86 (14 Aug 2023 12:31)    Daily Weight in k (14 Aug 2023 12:31)    PHYSICAL EXAM:    GENERAL:  NAD, chronically ill appearing   SKIN: No rashes or lesions  HEENT: Atraumatic. Normocephalic   NECK: Supple, No JVD.    PULMONARY: CTA B/L. No wheezing. No rales  CVS: Normal S1, S2. Rate and Rhythm are regular.    ABDOMEN/GI: Soft, Nontender, Nondistended   MSK:  No clubbing or cyanosis. Contracted   NEUROLOGIC: does not follow commands, withdraws LE to pain   PSYCH: AAOx0, nonverbal     Consultant(s) Notes Reviewed:  [x ] YES  [ ] NO  Care Discussed with Consultants/Other Providers [ x] YES  [ ] NO    LABS:                        11.9   8.37  )-----------( 158      ( 15 Aug 2023 05:08 )             35.9     142  |  105  |  16  ----------------------------<  89  3.5   |  25  |  0.5<L>    Ca    9.0      15 Aug 2023 05:08  Mg     1.7     08-15    TPro  6.0  /  Alb  3.7  /  TBili  1.1  /  DBili  x   /  AST  27  /  ALT  20  /  AlkPhos  77  08-14    PT/INR - ( 14 Aug 2023 00:25 )   PT: 12.40 sec;   INR: 1.08 ratio      PTT - ( 14 Aug 2023 00:25 )  PTT:29.6 sec    Trop <0.01, CKMB --, CK --, 23 @ 00:25    Culture - Blood (collected 14 Aug 2023 00:25)  Source: .Blood Blood-Peripheral  Preliminary Report (15 Aug 2023 09:02):    No growth at 24 hours    Culture - Blood (collected 14 Aug 2023 00:25)  Source: .Blood Blood-Peripheral  Preliminary Report (15 Aug 2023 09:02):    No growth at 24 hours    RADIOLOGY & ADDITIONAL TESTS:  Imaging or report Personally Reviewed:  [x] YES  [ ] NO  EKG reviewed: [x] YES  [ ] NO    Medications:  Standing  atorvastatin 20 milliGRAM(s) Oral at bedtime  cefepime   IVPB 2000 milliGRAM(s) IV Intermittent every 12 hours  chlorhexidine 2% Cloths 1 Application(s) Topical <User Schedule>  enoxaparin Injectable 40 milliGRAM(s) SubCutaneous every 24 hours  lactated ringers. 1000 milliLiter(s) IV Continuous <Continuous>    PRN Meds  bisacodyl Suppository 10 milliGRAM(s) Rectal daily PRN

## 2023-08-15 NOTE — DIETITIAN INITIAL EVALUATION ADULT - PERTINENT MEDS FT
MEDICATIONS  (STANDING):  atorvastatin 20 milliGRAM(s) Oral at bedtime  cefepime   IVPB 2000 milliGRAM(s) IV Intermittent every 12 hours  chlorhexidine 2% Cloths 1 Application(s) Topical <User Schedule>  enoxaparin Injectable 40 milliGRAM(s) SubCutaneous every 24 hours  lactated ringers. 1000 milliLiter(s) (50 mL/Hr) IV Continuous <Continuous>  magnesium sulfate  IVPB 2 Gram(s) IV Intermittent once    MEDICATIONS  (PRN):  bisacodyl Suppository 10 milliGRAM(s) Rectal daily PRN Constipation

## 2023-08-15 NOTE — PROGRESS NOTE ADULT - ASSESSMENT
Patient is a 72 year old Female with a PMHx of CVA (non-verbal and bedbound), hypothyroidism, hypercholesterolemia bought to the ED from home by sister due to abd pain/ fever and labored breathing. Patient septic on admission and found to have pneumonia with possible aspiration.    #Severe sepsis present on admission (Fever, HR>90 with lactic acidosis)   - Blood Cx 8/14 NG     #Aspiration vs GN pneumonia  - CT Chest/Abd/Pelvis showed Bilateral patchy airspace opacitiesh left lower lobe consolidation consistent with pneumonia in the appropriate clinical setting. Debris or secretions within trachea, with occlusion of small left lower lobe peripheral airway; correlate for aspiration. Large rectal fecal stool burden, 11 cm AP. Contrast, presumably excreted, within vagina, suspicious for vesicovaginal fistula. Findings suggestive of mild colitis involving segment of descending colon; no abscess.    #Colitis - possible stercoral colitis with fecal burden     Plan:  - febrile -- vitals stable, WBC stable --Left lower lobe infiltrates clearer -  continue cefepime 2g q 12 hours for now for pneumonia  - ensure bowel movements  - follow-up blood cx   - trend fever curve     Please call or message on [employer] Teams if with any questions.  Spectra [phone].

## 2023-08-15 NOTE — PROGRESS NOTE ADULT - ASSESSMENT
IMPRESSION:    Acute hypoxemic resp failure  Pneumonia/ possible aspiration  Sepsis POA  fecal impaction  HO CVA ( bedbound/ non verbal)      PLAN:    CNS: Avoid CNS depressant    HEENT:  Oral care    PULMONARY:  HOB @ 45 degrees, aspiration precautions, NC, keep SaO2 92 TO 96%    CARDIOVASCULAR: IVF    GI: GI prophylaxis                                          Feeding Speech and swallow eval, rectal exam, fleet enema    RENAL:  F/u  lytes.  Correct as needed. accurate I/O    INFECTIOUS DISEASE: abx per ID    HEMATOLOGICAL:  DVT prophylaxis.    ENDOCRINE:  Follow up FS.  Insulin protocol if needed.    SDU    GOC    Poor prognosis

## 2023-08-15 NOTE — PROGRESS NOTE ADULT - SUBJECTIVE AND OBJECTIVE BOX
ANNA COBOS  72y, Female  Allergy: No Known Allergies      LOS  1d    CHIEF COMPLAINT: sob (15 Aug 2023 06:35)      INTERVAL EVENTS/HPI  - No acute events overnight  - T(F): , Max: 102.1 (08-14-23 @ 21:25)  - febrile overnight - no fevers since  - NG tube placed  - WBC Count: 8.37 (08-15-23 @ 05:08)  WBC Count: 10.72 (08-14-23 @ 11:35)     - Creatinine: 0.5 (08-15-23 @ 05:08)  Creatinine: 0.5 (08-14-23 @ 11:35)       ROS  unable to obtain history secondary to patient's mental status and/or sedation      VITALS:  T(F): 98.8, Max: 102.1 (08-14-23 @ 21:25)  HR: 98  BP: 143/71  RR: 20Vital Signs Last 24 Hrs  T(C): 37.1 (15 Aug 2023 11:00), Max: 38.9 (14 Aug 2023 21:25)  T(F): 98.8 (15 Aug 2023 11:00), Max: 102.1 (14 Aug 2023 21:25)  HR: 98 (15 Aug 2023 11:00) (79 - 125)  BP: 143/71 (15 Aug 2023 11:00) (130/66 - 173/86)  BP(mean): 99 (15 Aug 2023 11:00) (99 - 110)  RR: 20 (15 Aug 2023 11:00) (18 - 20)  SpO2: 95% (15 Aug 2023 11:00) (95% - 96%)    Parameters below as of 15 Aug 2023 11:00  Patient On (Oxygen Delivery Method): room air        PHYSICAL EXAM:  Gen: NAD, resting in bed  HEENT: Normocephalic, atraumatic  Neck: supple, no lymphadenopathy  CV: Regular rate & regular rhythm  Lungs: decreased BS at bases, no fremitus  Abdomen: Soft, BS present  Ext: Warm, well perfused  Neuro: non focal, awake  Skin: no rash, no erythema  Lines: no phlebitis    FH: Non-contributory  Social Hx: Non-contributory    TESTS & MEASUREMENTS:                        11.9   8.37  )-----------( 158      ( 15 Aug 2023 05:08 )             35.9     08-15    142  |  105  |  16  ----------------------------<  89  3.5   |  25  |  0.5<L>    Ca    9.0      15 Aug 2023 05:08  Mg     1.7     08-15    TPro  6.0  /  Alb  3.7  /  TBili  1.1  /  DBili  x   /  AST  27  /  ALT  20  /  AlkPhos  77  08-14      LIVER FUNCTIONS - ( 14 Aug 2023 11:35 )  Alb: 3.7 g/dL / Pro: 6.0 g/dL / ALK PHOS: 77 U/L / ALT: 20 U/L / AST: 27 U/L / GGT: x           Urinalysis Basic - ( 15 Aug 2023 05:08 )    Color: x / Appearance: x / SG: x / pH: x  Gluc: 89 mg/dL / Ketone: x  / Bili: x / Urobili: x   Blood: x / Protein: x / Nitrite: x   Leuk Esterase: x / RBC: x / WBC x   Sq Epi: x / Non Sq Epi: x / Bacteria: x        Culture - Blood (collected 08-14-23 @ 00:25)  Source: .Blood Blood-Peripheral  Preliminary Report (08-15-23 @ 09:02):    No growth at 24 hours    Culture - Blood (collected 08-14-23 @ 00:25)  Source: .Blood Blood-Peripheral  Preliminary Report (08-15-23 @ 09:02):    No growth at 24 hours        Lactate, Blood: 1.4 mmol/L (08-14-23 @ 11:35)  Blood Gas Venous - Lactate: 2.90 mmol/L (08-14-23 @ 03:37)  Lactate, Blood: 3.6 mmol/L (08-14-23 @ 00:25)  Blood Gas Venous - Lactate: 3.60 mmol/L (08-14-23 @ 00:03)      INFECTIOUS DISEASES TESTING  Procalcitonin, Serum: 1.38 (08-14-23 @ 11:35)      INFLAMMATORY MARKERS      RADIOLOGY & ADDITIONAL TESTS:  I have personally reviewed the last available Chest xray  CXR  Xray Chest 1 View- PORTABLE-Urgent:   ACC: [id number] EXAM:  XR CHEST PORTABLE URGENT 1V   ORDERED BY: COURTNEY CHISHOLM     PROCEDURE DATE:  08/14/2023          INTERPRETATION:  Clinical History / Reason for exam: Sepsis    Comparison : Chest radiograph None.    Technique/Positioning: Frontal view.    Findings:    Support devices: None.    Cardiac/mediastinum/hilum: Unremarkable.    Lung parenchyma/Pleura: Patchy bilateral basilar opacities. No effusion   or pneumothorax.    Skeleton/soft tissues: There are degenerative changes of the thoracic   spine.    Impression:    Patchy bilateral basilar opacities. Please see concurrent CT    --- End of Report ---            JUANCARLOS HI MD; Attending Radiologist  This document has been electronically signed. Aug 14 2023  5:37AM (08-14-23 @ 00:05)      CT  CT Angio Chest PE Protocol w/ IV Cont:   ACC: [id number] EXAM:  CT ANGIO CHEST PULM ART WAWIC   ORDERED BY: COURTNEY CHISHOLM     ACC: [id number] EXAM:  CT ABDOMEN AND PELVIS IC   ORDERED BY: GREY LUKE     PROCEDURE DATE:  08/14/2023          INTERPRETATION:  CTA chest with IV contrast, CT abdomen and pelvis with   IV contrast    CLINICAL HISTORY/REASON FOR EXAM: Diarrhea, fever, shortness of breath    TECHNIQUE: CTA chest with IV contrast, CT abdomen and pelvis with IV   contrast performed. Initial CTA images of chest obtained with IV contrast   administration. Subsequently, CT images of abdomen and pelvis obtained   with IV contrast administration. Oral contrast was not administered.   Reformatted images in the coronal and sagittal planes were acquired. 3D   (MIP images) generated. There is motion and streak artifact due to   patient positioning.    CONTRAST: IV contrast documented in unlinked concurrent exam (accession   [id number]), Omnipaque 350 (accession [id number]) intravenous contrast. 95 cc   administered. 5 cc discarded.    COMPARISON: None available.      FINDINGS:    CHEST:      PULMONARY EMBOLUS: No evidence of acute pulmonary embolism.    LUNGS, PLEURA, AIRWAYS: Debris or secretions within the trachea.   Occlusion of the left lower segmental bronchi with bilateral patchy   airspace opacities with left lower lobe consolidation. No pneumothorax.   No pleural effusion.    THORACIC NODES: No mediastinal, hilar, supraclavicular, or axillary   lymphadenopathy.    MEDIASTINUM/GREAT VESSELS: No pericardial effusion. Heart size is within   normal limits. The aorta and main pulmonary artery are of normal caliber.    ABDOMEN/PELVIS:    HEPATOBILIARY: Unremarkable.    SPLEEN: Unremarkable.    PANCREAS: Unremarkable.    ADRENAL GLANDS: Unremarkable.    KIDNEYS: Symmetricpattern of renal enhancement. No hydronephrosis   bilaterally.    ABDOMINOPELVIC NODES: No lymphadenopathy.    PELVIC ORGANS: Contrast, presumably excreted, within vagina, suspicious   for vesicovaginal fistula. Diluted excreted contrast within urinary   bladder.    PERITONEUM/MESENTERY/BOWEL: Large rectal fecal stool burden, 11 cm AP .   No evidence of mechanical bowel obstruction. Mild colonic stool burden.   No small bowel obstruction. No ascites. No pneumoperitoneum. Mild wall   thickening, mucosal hyperenhancement of segment of descending colon; no   abscess    BONES/SOFT TISSUES: Degenerative changes of the spine. Anterolisthesis of   L4 on L5.    OTHER: Vascular calcifications.      IMPRESSION:    1. Bilateral patchy airspace opacitieswith left lower lobe consolidation   consistent with pneumonia in the appropriate clinical setting. Debris or   secretions within trachea, with occlusion of small left lower lobe   peripheral airway; correlate for aspiration.    2. No evidence of acute pulmonary embolism.    3. Large rectal fecal stool burden, 11 cm AP.    4. Contrast, presumably excreted, within vagina, suspicious for   vesicovaginal fistula.    5. Findings suggestive of mild colitis involving segment of descending   colon; no abscess.      Dr. Vishnu Frank discussed preliminary findings with GREY Hutton MD; Attending Emergency Medicine on 8/14/2023 3:23 AM with readback.    --- End of Report ---          VISHNU FRNAK MD; Resident Radiologist  This document has beenelectronically signed.  DELIO PHILLIP MD; Attending Radiologist  This document has been electronically signed. Aug 14 2023  3:38AM (08-14-23 @ 01:58)      CARDIOLOGY TESTING  12 Lead ECG:   Ventricular Rate 112 BPM    Atrial Rate 112 BPM    P-R Interval 162 ms    QRS Duration 60 ms    Q-T Interval 310 ms    QTC Calculation(Bazett) 423 ms    P Axis 65 degrees    R Axis 68 degrees    T Axis 44 degrees    Diagnosis Line Sinus tachycardia  Low voltage QRS  Septal infarct , age undetermined  Abnormal ECG    Confirmed by LOUIS NVOA MD ([id number]) on 8/15/2023 7:36:14 AM (08-14-23 @ 02:50)      MEDICATIONS  atorvastatin 20 Oral at bedtime  cefepime   IVPB 2000 IV Intermittent every 12 hours  chlorhexidine 2% Cloths 1 Topical <User Schedule>  enoxaparin Injectable 40 SubCutaneous every 24 hours  lactated ringers. 1000 IV Continuous <Continuous>      WEIGHT  Weight (kg): 59 (08-14-23 @ 13:18)  Creatinine: 0.5 mg/dL (08-15-23 @ 05:08)      ANTIBIOTICS:  cefepime   IVPB 2000 milliGRAM(s) IV Intermittent every 12 hours      All available historical records have been reviewed

## 2023-08-15 NOTE — SWALLOW BEDSIDE ASSESSMENT ADULT - SWALLOW EVAL: PATIENT/FAMILY GOALS STATEMENT
Family does not want long-term artificial means of nutrition, however is agreeable to further swallow testing to r/o aspiration

## 2023-08-15 NOTE — DIETITIAN INITIAL EVALUATION ADULT - COLLABORATION WITH OTHER PROVIDERS
Interventions: EN, coordination of care  Monitoring/Evaluation: diet order, energy intake, weight, labs, skin status, NFPE  Interventions: EN, coordination of care  Monitoring/Evaluation: diet order, EN tolerance, GI s/s, weight, labs, skin status, NFPE

## 2023-08-15 NOTE — DIETITIAN INITIAL EVALUATION ADULT - PERTINENT LABORATORY DATA
08-15    142  |  105  |  16  ----------------------------<  89  3.5   |  25  |  0.5<L>    Ca    9.0      15 Aug 2023 05:08  Mg     1.7     08-15    TPro  6.0  /  Alb  3.7  /  TBili  1.1  /  DBili  x   /  AST  27  /  ALT  20  /  AlkPhos  77  08-14

## 2023-08-15 NOTE — DIETITIAN INITIAL EVALUATION ADULT - NAME AND PHONE
Frances Leung, RD [phone] or via Teams     Patient is at high nutrition risk, RD to f/u in 3-5 days or PRN

## 2023-08-15 NOTE — DIETITIAN INITIAL EVALUATION ADULT - ENTERAL
Jevity 1.2 Pepito - 375 mL q8hrs (3x) - which will provide 1350 kcal, 68 gm Protein, 911 mL free water (+ recommend 100 mL flushes pre/post feeds) = 1511 mL total water

## 2023-08-15 NOTE — DIETITIAN INITIAL EVALUATION ADULT - ORAL INTAKE PTA/DIET HISTORY
Nutrition hx obtained from patient's sister Tasha ([phone]). Patient with good appetite and PO intake PTA. She was eating pureed foods which included chicken, sweet potatoes and other foods. She was taking multivitamin, B12, and an herb for memory  and sometimes Ensure for supplementation. UBW: 117 lbs when discharged from NH a few months ago - endorses weight gain. NKFA, no food intolerances. Reports that patient's voice has been gurgly recently.    Patient is currently NPO at this time.

## 2023-08-15 NOTE — DIETITIAN INITIAL EVALUATION ADULT - OTHER INFO
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 was hypoxic at 86% on RA and altered. Pt. was placed in NRB 15L and O2 sat improved to 96%. Pt's sister states patient has congestion nose for the last few days with dry cough, in the ED T max 102.2F, , BP stable, labs showed lactate 3.6, CT chest showed left lower lobe consolidation, Admitted to SDU for closer monitoring.    Acute hypoxic respiratory failure; left lower lobe pneumonia: gram negative vs aspiration; severe sepsis on admission: fever, tachycardia; Recent URI; Constipation; Stercoral colitis - CT abdomen showed large rectal fecal stool burden. Contrast presumably excreted, within vagina; suspicious for vesicovaginal fistula. Findings suggestive of mild colitis involving segment of descending colon; no abscess. Unable to manually disimpact today; start tap water enemas q24hr x 4 today. Once NGT placed, start Senna, Miralax q8hrs with daily bisacodyl supp; daily KUB; Hx of CVA: functional quadriplegia - patient is bedbound    8/14 SLP eval - recommended NPO with non-oral means of nutrition /hydration - PO trials unsafe 2/2 poor arousability     Overall poor prognosis

## 2023-08-16 LAB
ALBUMIN SERPL ELPH-MCNC: 3.1 G/DL — LOW (ref 3.5–5.2)
ALP SERPL-CCNC: 72 U/L — SIGNIFICANT CHANGE UP (ref 30–115)
ALT FLD-CCNC: 16 U/L — SIGNIFICANT CHANGE UP (ref 0–41)
ANION GAP SERPL CALC-SCNC: 9 MMOL/L — SIGNIFICANT CHANGE UP (ref 7–14)
AST SERPL-CCNC: 23 U/L — SIGNIFICANT CHANGE UP (ref 0–41)
BILIRUB SERPL-MCNC: 1 MG/DL — SIGNIFICANT CHANGE UP (ref 0.2–1.2)
BUN SERPL-MCNC: 11 MG/DL — SIGNIFICANT CHANGE UP (ref 10–20)
CALCIUM SERPL-MCNC: 8.3 MG/DL — LOW (ref 8.4–10.5)
CHLORIDE SERPL-SCNC: 102 MMOL/L — SIGNIFICANT CHANGE UP (ref 98–110)
CO2 SERPL-SCNC: 28 MMOL/L — SIGNIFICANT CHANGE UP (ref 17–32)
CREAT SERPL-MCNC: <0.5 MG/DL — LOW (ref 0.7–1.5)
EGFR: 104 ML/MIN/1.73M2 — SIGNIFICANT CHANGE UP
GLUCOSE SERPL-MCNC: 99 MG/DL — SIGNIFICANT CHANGE UP (ref 70–99)
HCT VFR BLD CALC: 33.4 % — LOW (ref 37–47)
HGB BLD-MCNC: 11.1 G/DL — LOW (ref 12–16)
MCHC RBC-ENTMCNC: 30.5 PG — SIGNIFICANT CHANGE UP (ref 27–31)
MCHC RBC-ENTMCNC: 33.2 G/DL — SIGNIFICANT CHANGE UP (ref 32–37)
MCV RBC AUTO: 91.8 FL — SIGNIFICANT CHANGE UP (ref 81–99)
NRBC # BLD: 0 /100 WBCS — SIGNIFICANT CHANGE UP (ref 0–0)
PLATELET # BLD AUTO: 176 K/UL — SIGNIFICANT CHANGE UP (ref 130–400)
PMV BLD: 10.8 FL — HIGH (ref 7.4–10.4)
POTASSIUM SERPL-MCNC: 3.5 MMOL/L — SIGNIFICANT CHANGE UP (ref 3.5–5)
POTASSIUM SERPL-SCNC: 3.5 MMOL/L — SIGNIFICANT CHANGE UP (ref 3.5–5)
PROT SERPL-MCNC: 5.3 G/DL — LOW (ref 6–8)
RBC # BLD: 3.64 M/UL — LOW (ref 4.2–5.4)
RBC # FLD: 12.5 % — SIGNIFICANT CHANGE UP (ref 11.5–14.5)
SODIUM SERPL-SCNC: 139 MMOL/L — SIGNIFICANT CHANGE UP (ref 135–146)
WBC # BLD: 8.42 K/UL — SIGNIFICANT CHANGE UP (ref 4.8–10.8)
WBC # FLD AUTO: 8.42 K/UL — SIGNIFICANT CHANGE UP (ref 4.8–10.8)

## 2023-08-16 PROCEDURE — 93010 ELECTROCARDIOGRAM REPORT: CPT

## 2023-08-16 PROCEDURE — 99232 SBSQ HOSP IP/OBS MODERATE 35: CPT

## 2023-08-16 PROCEDURE — 74018 RADEX ABDOMEN 1 VIEW: CPT | Mod: 26

## 2023-08-16 PROCEDURE — 71045 X-RAY EXAM CHEST 1 VIEW: CPT | Mod: 26

## 2023-08-16 PROCEDURE — 71045 X-RAY EXAM CHEST 1 VIEW: CPT | Mod: 26,77

## 2023-08-16 PROCEDURE — 99233 SBSQ HOSP IP/OBS HIGH 50: CPT

## 2023-08-16 RX ORDER — CEFTRIAXONE 500 MG/1
2000 INJECTION, POWDER, FOR SOLUTION INTRAMUSCULAR; INTRAVENOUS EVERY 24 HOURS
Refills: 0 | Status: COMPLETED | OUTPATIENT
Start: 2023-08-16 | End: 2023-08-22

## 2023-08-16 RX ADMIN — POLYETHYLENE GLYCOL 3350 17 GRAM(S): 17 POWDER, FOR SOLUTION ORAL at 05:41

## 2023-08-16 RX ADMIN — CHLORHEXIDINE GLUCONATE 1 APPLICATION(S): 213 SOLUTION TOPICAL at 05:40

## 2023-08-16 RX ADMIN — POLYETHYLENE GLYCOL 3350 17 GRAM(S): 17 POWDER, FOR SOLUTION ORAL at 17:45

## 2023-08-16 RX ADMIN — CEFTRIAXONE 100 MILLIGRAM(S): 500 INJECTION, POWDER, FOR SOLUTION INTRAMUSCULAR; INTRAVENOUS at 17:44

## 2023-08-16 RX ADMIN — ENOXAPARIN SODIUM 40 MILLIGRAM(S): 100 INJECTION SUBCUTANEOUS at 21:52

## 2023-08-16 RX ADMIN — CEFEPIME 100 MILLIGRAM(S): 1 INJECTION, POWDER, FOR SOLUTION INTRAMUSCULAR; INTRAVENOUS at 05:41

## 2023-08-16 RX ADMIN — ATORVASTATIN CALCIUM 20 MILLIGRAM(S): 80 TABLET, FILM COATED ORAL at 21:52

## 2023-08-16 RX ADMIN — SENNA PLUS 2 TABLET(S): 8.6 TABLET ORAL at 21:52

## 2023-08-16 NOTE — PROGRESS NOTE ADULT - ASSESSMENT
Patient is a 72 year old Female with a PMHx of CVA (non-verbal and bedbound), hypothyroidism, hypercholesterolemia bought to the ED from home by sister due to abd pain/ fever and labored breathing. Patient septic on admission and found to have pneumonia with possible aspiration.    #Severe sepsis present on admission (Fever, HR>90 with lactic acidosis)   - Blood Cx 8/14 NG     #Aspiration vs GN pneumonia  - CT Chest/Abd/Pelvis showed Bilateral patchy airspace opacitiesh left lower lobe consolidation consistent with pneumonia in the appropriate clinical setting. Debris or secretions within trachea, with occlusion of small left lower lobe peripheral airway; correlate for aspiration. Large rectal fecal stool burden, 11 cm AP. Contrast, presumably excreted, within vagina, suspicious for vesicovaginal fistula. Findings suggestive of mild colitis involving segment of descending colon; no abscess.    #Colitis - possible stercoral colitis with fecal burden     Plan:  - narrow cefepime to ceftriaxone 2g daily   - ensure bowel movements  - trend fever curve   - if remains afebrile, will plan at least 7 days from last fever (8/15-8/22)    Please call or message on [employer] Teams if with any questions.  Spectra [phone].

## 2023-08-16 NOTE — CHART NOTE - NSCHARTNOTEFT_GEN_A_CORE
SDU Transfer Note    Transfer from: SDU    Transfer to: ( x ) Medicine         FOLLOW UP:    IV Cefepime per ID    Blood cx  Urine strep/legionella  Aspiration precautions  ID recs  enemas, bowel movement--> disimpaction if fails  Bowel regimen  Daily KUBs  S/S    HPI / SDU COURSE:    Ms Laird is a 72 year old Female with a PMHx of CVA (non-verbal and bedbound), hypothyroidism, hypercholesterolemia bought to the ED from home by sister due to abd pain/ fever and labored breathing. When EMS arrived on scene, pt. was hypoxic at 86% on RA and altered. Pt. was placed in NRB 15L and O2 sat improved to 96%. Pt's sister states pt was exposed to a sick caregiver and starting 3 days ago pt has been noted to have nasal congestion and non productive cough. Of note sister states pt was RUMC due to diarrhea/ constipation and discharged yesterday.     Vital Signs Last 12 Hrs  T(F): 100.3 (23 @ 01:45), Max: 102.2 (23 @ 00:15)  HR: 100 (23 @ 01:45) (100 - 140)  BP: 134/66 (23 @ 01:45) (104/62 - 134/66)  RR: 20 (23 @ 01:45) (20 - 20)  SpO2: 97% (23 @ 01:45) (96% - 98%)    Labs in the ED are significant for Lactate of 3.6 -> 2.9; VBG pCO2 52 and HCO3 30.     CT Chest/Abd/Pelvis: Bilateral patchy airspace opacities with left lower lobe consolidation consistent with pneumonia in the appropriate clinical setting. Debris or secretions within trachea, with occlusion of small left lower lobe peripheral airway; correlate for aspiration. No evidence of acute pulmonary embolism. Large rectal fecal stool burden, 11 cm AP. Contrast, presumably excreted, within vagina, suspicious for vesicovaginal fistula. Findings suggestive of mild colitis involving segment of descending colon; no abscess.    In the SDU, pt was NPO per S/S assessment. She is on RA, SpO2 of 95%. She is nonverbal, contracted, had crackles on B/L lung fields. She is urinating but had a bm yesterday post 4 enemas. Currently she is fed through NG tube. She is hemodynamically stable and is for a downgrade.     Vital Signs Last 24 Hrs  T(C): 37.3 (16 Aug 2023 08:28), Max: 37.3 (16 Aug 2023 04:00)  T(F): 99.2 (16 Aug 2023 08:28), Max: 99.2 (16 Aug 2023 04:00)  HR: 86 (16 Aug 2023 08:) (73 - 98)  BP: 118/57 (16 Aug 2023 08:28) (117/56 - 143/71)  BP(mean): 98 (16 Aug 2023 08:) (80 - 99)  RR: 20 (16 Aug 2023 08:) (20 - 20)  SpO2: 96% (16 Aug 2023 08:) (95% - 100%)    Parameters below as of 16 Aug 2023 08:28  Patient On (Oxygen Delivery Method): room air      I&O's Summary    15 Aug 2023 07:01  -  16 Aug 2023 07:00  --------------------------------------------------------  IN: 555 mL / OUT: 302 mL / NET: 253 mL    LABS:                           11.1   8.42  )-----------( 176      ( 16 Aug 2023 06:37 )             33.4       08-16    139  |  102  |  11  ----------------------------<  99  3.5   |  28  |  <0.5<L>    Ca    8.3<L>      16 Aug 2023 06:37  Mg     1.7     08-15    TPro  5.3<L>  /  Alb  3.1<L>  /  TBili  1.0  /  DBili  x   /  AST  23  /  ALT  16  /  AlkPhos  72  08-16      EC Lead ECG:   Ventricular Rate 112 BPM    Atrial Rate 112 BPM    P-R Interval 162 ms    QRS Duration 60 ms    Q-T Interval 310 ms    QTC Calculation(Bazett) 423 ms    P Axis 65 degrees    R Axis 68 degrees    T Axis 44 degrees    Diagnosis Line Sinus tachycardia  Low voltage QRS  Septal infarct , age undetermined  Abnormal ECG    Confirmed by ROHINI HARRIS, LOUIS ([id number]) on 8/15/2023 7:36:14 AM (23 @ 02:50)    ASSESSMENT & PLAN:     72 year old Female with a PMH of CVA (non-verbal and bedbound) with residual upper and LE weakness, bed bound,  hypothyroidism, hypercholesterolemia bought to the ED from home by sister for SOB and fever, When EMS arrived on scene, pt. was hypoxic at 86% on RA and altered. Pt. was placed in NRB 15L and O2 sat improved to 96%. Pt's sister states patient has congestion nose for the last few days with dry cough, in the ED T max 102.2F, , BP stable, labs showed lactate 3.6, CT chest showed left lower lobe consolidation, Admitted to SDU for closer monitoring.    #Acute Hypoxic respiratory failure:   #Left lower lobe pneumonia: gram negative v aspiration  #Severe Sepsis on admission; fever, tachycardia.   #Recent URI   Ct chest showed left lower lobe consolidation. Debris or secretions within trachea, with occlusion of small left lower lobe peripheral airway; correlate for aspiration  WBC 10K, lactate 3.6--->1.4  Speech and swallow, recommended NPO---> NG tube placed  Started on Cefepime per ID,    procal  1.4  blood cx NGTD,   check urine strep/legionella  Aspiration precaution.   ID following     #Constipation  #Stercoral colitis   CT abdomen showed Large rectal fecal stool burden, 11 cm AP. Contrast, presumably excreted, within vagina, suspicious for vesicovaginal fistula. Findings suggestive of mild colitis involving segment of descending colon; no abscess.  unable to manually disimpact today  monitoring for BM  start tap water enemas q2H x4 today. once NGT placed, start Senna, Miralax q8H with daily bisacodyl supp----> had BM  daily KUB     History of CVA:   Functional quadriplegia:   Patient is bedbound  off loading    CODE Status DNR/DNI, overall very poor prognosis.          FOR FOLLOW UP:  [ ]   [ ]   [ ]       Nicol Gong  PGY-1

## 2023-08-16 NOTE — PROGRESS NOTE ADULT - SUBJECTIVE AND OBJECTIVE BOX
Over Night Events: Events noted, on RA, NGT, speech/ ID noted    PHYSICAL EXAM    ICU Vital Signs Last 24 Hrs  T(C): 37.3 (16 Aug 2023 04:00), Max: 37.5 (15 Aug 2023 07:18)  T(F): 99.2 (16 Aug 2023 04:00), Max: 99.5 (15 Aug 2023 07:18)  HR: 92 (16 Aug 2023 04:00) (73 - 98)  BP: 127/66 (16 Aug 2023 04:00) (117/56 - 152/77)  BP(mean): 91 (16 Aug 2023 04:00) (80 - 110)  RR: 20 (16 Aug 2023 04:00) (20 - 20)  SpO2: 96% (16 Aug 2023 04:00) (95% - 100%)    O2 Parameters below as of 15 Aug 2023 20:00  Patient On (Oxygen Delivery Method): room air            General: ill looking  Lungs: dec bs both bases   Cardiovascular: Regular   Abdomen: Soft, Positive BS  Extremities: No clubbing   Neurological: Non focal       08-14-23 @ 07:01  -  08-15-23 @ 07:00  --------------------------------------------------------  IN:  Total IN: 0 mL    OUT:    Stool (mL): 1 mL    Voided (mL): 1 mL  Total OUT: 2 mL    Total NET: -2 mL      08-15-23 @ 07:01  -  08-16-23 @ 06:41  --------------------------------------------------------  IN:    Jevity 1.2: 350 mL    Lactated Ringers: 205 mL  Total IN: 555 mL    OUT:    Stool (mL): 2 mL    Voided (mL): 300 mL  Total OUT: 302 mL    Total NET: 253 mL          LABS:                          11.9   8.37  )-----------( 158      ( 15 Aug 2023 05:08 )             35.9                                               08-15    142  |  105  |  16  ----------------------------<  89  3.5   |  25  |  0.5<L>    Ca    9.0      15 Aug 2023 05:08  Mg     1.7     08-15    TPro  6.0  /  Alb  3.7  /  TBili  1.1  /  DBili  x   /  AST  27  /  ALT  20  /  AlkPhos  77  08-14                                             Urinalysis Basic - ( 15 Aug 2023 05:08 )    Color: x / Appearance: x / SG: x / pH: x  Gluc: 89 mg/dL / Ketone: x  / Bili: x / Urobili: x   Blood: x / Protein: x / Nitrite: x   Leuk Esterase: x / RBC: x / WBC x   Sq Epi: x / Non Sq Epi: x / Bacteria: x                                                  LIVER FUNCTIONS - ( 14 Aug 2023 11:35 )  Alb: 3.7 g/dL / Pro: 6.0 g/dL / ALK PHOS: 77 U/L / ALT: 20 U/L / AST: 27 U/L / GGT: x                                                  Culture - Urine (collected 14 Aug 2023 15:20)  Source: Clean Catch Clean Catch (Midstream)  Final Report (15 Aug 2023 19:54):    <10,000 CFU/mL Normal Urogenital Dari    Culture - Blood (collected 14 Aug 2023 00:25)  Source: .Blood Blood-Peripheral  Preliminary Report (15 Aug 2023 09:02):    No growth at 24 hours    Culture - Blood (collected 14 Aug 2023 00:25)  Source: .Blood Blood-Peripheral  Preliminary Report (15 Aug 2023 09:02):    No growth at 24 hours                                                                                           MEDICATIONS  (STANDING):  atorvastatin 20 milliGRAM(s) Oral at bedtime  cefepime   IVPB 2000 milliGRAM(s) IV Intermittent every 12 hours  chlorhexidine 2% Cloths 1 Application(s) Topical <User Schedule>  enoxaparin Injectable 40 milliGRAM(s) SubCutaneous every 24 hours  lactated ringers. 1000 milliLiter(s) (50 mL/Hr) IV Continuous <Continuous>  polyethylene glycol 3350 17 Gram(s) Oral every 12 hours  senna 2 Tablet(s) Oral at bedtime    MEDICATIONS  (PRN):  acetaminophen     Tablet .. 650 milliGRAM(s) Oral every 6 hours PRN Moderate Pain (4 - 6)  bisacodyl Suppository 10 milliGRAM(s) Rectal daily PRN Constipation

## 2023-08-16 NOTE — SWALLOW BEDSIDE ASSESSMENT ADULT - PHARYNGEAL PHASE
increased congestion, gurgly vocal quality/Delayed pharyngeal swallow/Wet vocal quality post oral intake
increased congestion, gurgly vocal quality/Delayed pharyngeal swallow/Wet vocal quality post oral intake

## 2023-08-16 NOTE — PROGRESS NOTE ADULT - SUBJECTIVE AND OBJECTIVE BOX
ANNA COBOS  72y Female    CHIEF COMPLAINT:    Patient is a 72y old  Female who presents with a chief complaint of sob (15 Aug 2023 06:35)    INTERVAL HPI/OVERNIGHT EVENTS:    Patient seen and examined. No acute events overnight.     ROS: All other systems are negative.    Vital Signs:    Vital Signs Last 24 Hrs  T(C): 36.7 (16 Aug 2023 12:03), Max: 37.3 (16 Aug 2023 04:00)  T(F): 98 (16 Aug 2023 12:03), Max: 99.2 (16 Aug 2023 04:00)  HR: 77 (16 Aug 2023 12:03) (73 - 92)  BP: 127/68 (16 Aug 2023 12:03) (117/56 - 140/66)  BP(mean): 100 (16 Aug 2023 12:03) (80 - 100)  RR: 20 (16 Aug 2023 12:03) (20 - 20)  SpO2: 96% (16 Aug 2023 12:03) (96% - 100%)    Parameters below as of 16 Aug 2023 08:28  Patient On (Oxygen Delivery Method): room air        PHYSICAL EXAM:    GENERAL:  NAD, chronically ill appearing   SKIN: No rashes or lesions  HEENT: Atraumatic. Normocephalic   NECK: Supple, No JVD.    PULMONARY: CTA B/L. No wheezing. No rales  CVS: Normal S1, S2. Rate and Rhythm are regular.    ABDOMEN/GI: Soft, Nontender, Nondistended   MSK:  No clubbing or cyanosis. Contracted   NEUROLOGIC: does not follow commands,   PSYCH: AAOx0, nonverbal     Consultant(s) Notes Reviewed:  [x ] YES  [ ] NO  Care Discussed with Consultants/Other Providers [ x] YES  [ ] NO    LABS:               LABS:                        11.1   8.42  )-----------( 176      ( 16 Aug 2023 06:37 )             33.4     08-16    139  |  102  |  11  ----------------------------<  99  3.5   |  28  |  <0.5<L>    Ca    8.3<L>      16 Aug 2023 06:37  Mg     1.7     08-15    TPro  5.3<L>  /  Alb  3.1<L>  /  TBili  1.0  /  DBili  x   /  AST  23  /  ALT  16  /  AlkPhos  72  08-16            Culture - Blood (collected 14 Aug 2023 00:25)  Source: .Blood Blood-Peripheral  Preliminary Report (15 Aug 2023 09:02):    No growth at 24 hours    RADIOLOGY & ADDITIONAL TESTS:  Imaging or report Personally Reviewed:  [x] YES  [ ] NO  EKG reviewed: [x] YES  [ ] NO    Medications:  Standing  atorvastatin 20 milliGRAM(s) Oral at bedtime  cefepime   IVPB 2000 milliGRAM(s) IV Intermittent every 12 hours  chlorhexidine 2% Cloths 1 Application(s) Topical <User Schedule>  enoxaparin Injectable 40 milliGRAM(s) SubCutaneous every 24 hours  lactated ringers. 1000 milliLiter(s) IV Continuous <Continuous>    PRN Meds  bisacodyl Suppository 10 milliGRAM(s) Rectal daily PRN

## 2023-08-16 NOTE — SWALLOW BEDSIDE ASSESSMENT ADULT - ORAL PHASE
Delayed oral transit time/Lingual stasis
Decreased anterior-posterior movement of the bolus/Delayed oral transit time/Stasis in lateral sulci/Lingual stasis

## 2023-08-16 NOTE — PROGRESS NOTE ADULT - ASSESSMENT
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 was hypoxic at 86% on RA and altered. Pt. was placed in NRB 15L and O2 sat improved to 96%. Pt's sister states patient has congestion nose for the last few days with dry cough, in the ED T max 102.2F, , BP stable, labs showed lactate 3.6, CT chest showed left lower lobe consolidation, Admitted to SDU for closer monitoring     Acute Hypoxic respiratory failure: improving   Left lower lobe pneumonia: gram negative v aspiration  Severe Sepsis on admission; fever, tachycardia.   Recent URI   Ct chest showed left lower lobe consolidation. Debris or secretions within trachea, with occlusion of small left lower lobe peripheral airway; correlate for aspiration  WBC 10K, lactate 3.6>1.4  Speech and swallow, recommended NPO. Family agreeable to NGT   Started  on Cefepime per ID,  procal  1.4  blood cx NGTD, check urine strep/legionella  Aspiration precaution.   ID following , follow recommendations     Constipation  Stercoral colitis   CT abdomen showed Large rectal fecal stool burden, 11 cm AP. Contrast, presumably excreted, within vagina, suspicious for vesicovaginal fistula. Findings suggestive of mild colitis involving segment of descending colon; no abscess.  unable to manually disimpact today  tap water enemas, once NGT placed, start Senna, Miralax q8H with daily bisacodyl supp  daily KUB     History of CVA:   Functional quadriplegia:   Patient is bedbound  off loading,     CODE Status DNR/DNI, overall very poor prognosis       follow up: constipation, daily kub xray, follow ID,

## 2023-08-16 NOTE — PROGRESS NOTE ADULT - SUBJECTIVE AND OBJECTIVE BOX
ANNA COBOS  72y, Female  Allergy: No Known Allergies      LOS  2d    CHIEF COMPLAINT: SOB (16 Aug 2023 06:41)      INTERVAL EVENTS/HPI  - No acute events overnight  - T(F): , Max: 99.2 (08-16-23 @ 04:00)  - no further fevers   - WBC Count: 8.42 (08-16-23 @ 06:37)  WBC Count: 8.37 (08-15-23 @ 05:08)     - Creatinine: <0.5 (08-16-23 @ 06:37)  Creatinine: 0.5 (08-15-23 @ 05:08)       ROS  General: Denies rigors, nightsweats  HEENT: Denies headache, rhinorrhea, sore throat, eye pain  CV: Denies CP, palpitations  PULM: Denies wheezing, hemoptysis  GI: Denies hematemesis, hematochezia, melena  : Denies discharge, hematuria  MSK: Denies arthralgias, myalgias  SKIN: Denies rash, lesions  NEURO: Denies paresthesias, weakness  PSYCH: Denies depression, anxiety    VITALS:  T(F): 99.2, Max: 99.2 (08-16-23 @ 04:00)  HR: 86  BP: 118/57  RR: 20Vital Signs Last 24 Hrs  T(C): 37.3 (16 Aug 2023 08:28), Max: 37.3 (16 Aug 2023 04:00)  T(F): 99.2 (16 Aug 2023 08:28), Max: 99.2 (16 Aug 2023 04:00)  HR: 86 (16 Aug 2023 08:28) (73 - 92)  BP: 118/57 (16 Aug 2023 08:28) (117/56 - 140/66)  BP(mean): 98 (16 Aug 2023 08:28) (80 - 98)  RR: 20 (16 Aug 2023 08:28) (20 - 20)  SpO2: 96% (16 Aug 2023 08:28) (96% - 100%)    Parameters below as of 16 Aug 2023 08:28  Patient On (Oxygen Delivery Method): room air        PHYSICAL EXAM:  Gen: NAD, resting in bed  HEENT: Normocephalic, atraumatic  Neck: supple, no lymphadenopathy  CV: Regular rate & regular rhythm  Lungs: decreased BS at bases, no fremitus  Abdomen: Soft, BS present  Ext: Warm, well perfused  Neuro: non focal, awake  Skin: no rash, no erythema  Lines: no phlebitis    FH: Non-contributory  Social Hx: Non-contributory    TESTS & MEASUREMENTS:                        11.1   8.42  )-----------( 176      ( 16 Aug 2023 06:37 )             33.4     08-16    139  |  102  |  11  ----------------------------<  99  3.5   |  28  |  <0.5<L>    Ca    8.3<L>      16 Aug 2023 06:37  Mg     1.7     08-15    TPro  5.3<L>  /  Alb  3.1<L>  /  TBili  1.0  /  DBili  x   /  AST  23  /  ALT  16  /  AlkPhos  72  08-16      LIVER FUNCTIONS - ( 16 Aug 2023 06:37 )  Alb: 3.1 g/dL / Pro: 5.3 g/dL / ALK PHOS: 72 U/L / ALT: 16 U/L / AST: 23 U/L / GGT: x           Urinalysis Basic - ( 16 Aug 2023 06:37 )    Color: x / Appearance: x / SG: x / pH: x  Gluc: 99 mg/dL / Ketone: x  / Bili: x / Urobili: x   Blood: x / Protein: x / Nitrite: x   Leuk Esterase: x / RBC: x / WBC x   Sq Epi: x / Non Sq Epi: x / Bacteria: x        Culture - Urine (collected 08-14-23 @ 15:20)  Source: Clean Catch Clean Catch (Midstream)  Final Report (08-15-23 @ 19:54):    <10,000 CFU/mL Normal Urogenital Dari    Culture - Blood (collected 08-14-23 @ 00:25)  Source: .Blood Blood-Peripheral  Preliminary Report (08-16-23 @ 09:01):    No growth at 48 Hours    Culture - Blood (collected 08-14-23 @ 00:25)  Source: .Blood Blood-Peripheral  Preliminary Report (08-16-23 @ 09:01):    No growth at 48 Hours        Lactate, Blood: 1.4 mmol/L (08-14-23 @ 11:35)  Blood Gas Venous - Lactate: 2.90 mmol/L (08-14-23 @ 03:37)  Lactate, Blood: 3.6 mmol/L (08-14-23 @ 00:25)  Blood Gas Venous - Lactate: 3.60 mmol/L (08-14-23 @ 00:03)      INFECTIOUS DISEASES TESTING  Procalcitonin, Serum: 1.38 (08-14-23 @ 11:35)      INFLAMMATORY MARKERS      RADIOLOGY & ADDITIONAL TESTS:  I have personally reviewed the last available Chest xray  CXR  Xray Chest 1 View- PORTABLE-Urgent:   ACC: [id number] EXAM:  XR CHEST PORTABLE URGENT 1V   ORDERED BY: MIHAI ROJAS     PROCEDURE DATE:  08/16/2023          INTERPRETATION:  CLINICAL HISTORY: NGT placement.    COMPARISON: 8/15/2023.    TECHNIQUE: Portable frontal chest radiograph. Adequatepositioning.    FINDINGS:    Support devices: Enteric tube is seen coursing below the left   hemidiaphragm and off the field-of-view.    Cardiac/mediastinum/hilum: Stable.    Lung parenchyma/Pleura: No focal parenchymal opacities, pleural   effusions, or pneumothorax.    Skeleton/soft tissues: Stable.      IMPRESSION:  Enteric tube is seen coursing below the left hemidiaphragm and off the   field-of-view.  No radiographic evidence of acute cardiopulmonary disease.    --- End of Report ---            SHARI DE LEON MD; Attending Radiologist  This document has been electronically signed. Aug 16 2023  8:25AM (08-16-23 @ 02:10)      CT  CT Angio Chest PE Protocol w/ IV Cont:   ACC: [id number] [facility]   ORDERED BY: COURTNEY CHISHOLM     ACC: [id number] EXAM:  CT ABDOMEN AND PELVIS IC   ORDERED BY: GREY LUKE     PROCEDURE DATE:  08/14/2023          INTERPRETATION:  CTA chest with IV contrast, CT abdomen and pelvis with   IV contrast    CLINICAL HISTORY/REASON FOR EXAM: Diarrhea, fever, shortness of breath    TECHNIQUE: CTA chest with IV contrast, CT abdomen and pelvis with IV   contrast performed. Initial CTA images of chest obtained with IV contrast   administration. Subsequently, CT images of abdomen and pelvis obtained   with IV contrast administration. Oral contrast was not administered.   Reformatted images in the coronal and sagittal planes were acquired. 3D   (MIP images) generated. There is motion and streak artifact due to   patient positioning.    CONTRAST: IV contrast documented in unlinked concurrent exam (accession   [id number]), Omnipaque 350 (accession [id number]) intravenous contrast. 95 cc   administered. 5 cc discarded.    COMPARISON: None available.      FINDINGS:    CHEST:      PULMONARY EMBOLUS: No evidence of acute pulmonary embolism.    LUNGS, PLEURA, AIRWAYS: Debris or secretions within the trachea.   Occlusion of the left lower segmental bronchi with bilateral patchy   airspace opacities with left lower lobe consolidation. No pneumothorax.   No pleural effusion.    THORACIC NODES: No mediastinal, hilar, supraclavicular, or axillary   lymphadenopathy.    MEDIASTINUM/GREAT VESSELS: No pericardial effusion. Heart size is within   normal limits. The aorta and main pulmonary artery are of normal caliber.    ABDOMEN/PELVIS:    HEPATOBILIARY: Unremarkable.    SPLEEN: Unremarkable.    PANCREAS: Unremarkable.    ADRENAL GLANDS: Unremarkable.    KIDNEYS: Symmetricpattern of renal enhancement. No hydronephrosis   bilaterally.    ABDOMINOPELVIC NODES: No lymphadenopathy.    PELVIC ORGANS: Contrast, presumably excreted, within vagina, suspicious   for vesicovaginal fistula. Diluted excreted contrast within urinary   bladder.    PERITONEUM/MESENTERY/BOWEL: Large rectal fecal stool burden, 11 cm AP .   No evidence of mechanical bowel obstruction. Mild colonic stool burden.   No small bowel obstruction. No ascites. No pneumoperitoneum. Mild wall   thickening, mucosal hyperenhancement of segment of descending colon; no   abscess    BONES/SOFT TISSUES: Degenerative changes of the spine. Anterolisthesis of   L4 on L5.    OTHER: Vascular calcifications.      IMPRESSION:    1. Bilateral patchy airspace opacitieswith left lower lobe consolidation   consistent with pneumonia in the appropriate clinical setting. Debris or   secretions within trachea, with occlusion of small left lower lobe   peripheral airway; correlate for aspiration.    2. No evidence of acute pulmonary embolism.    3. Large rectal fecal stool burden, 11 cm AP.    4. Contrast, presumably excreted, within vagina, suspicious for   vesicovaginal fistula.    5. Findings suggestive of mild colitis involving segment of descending   colon; no abscess.      Dr. Vishnu Frank discussed preliminary findings with GREY Hutton MD; Attending Emergency Medicine on 8/14/2023 3:23 AM with readback.    --- End of Report ---          VISHNU FRANK MD; Resident Radiologist  This document has beenelectronically signed.  DELIO PHILLIP MD; Attending Radiologist  This document has been electronically signed. Aug 14 2023  3:38AM (08-14-23 @ 01:58)      CARDIOLOGY TESTING  12 Lead ECG:   Ventricular Rate 112 BPM    Atrial Rate 112 BPM    P-R Interval 162 ms    QRS Duration 60 ms    Q-T Interval 310 ms    QTC Calculation(Bazett) 423 ms    P Axis 65 degrees    R Axis 68 degrees    T Axis 44 degrees    Diagnosis Line Sinus tachycardia  Low voltage QRS  Septal infarct , age undetermined  Abnormal ECG    Confirmed by LOUIS NOVA MD ([id number]) on 8/15/2023 7:36:14 AM (08-14-23 @ 02:50)      MEDICATIONS  atorvastatin 20 Oral at bedtime  cefepime   IVPB 2000 IV Intermittent every 12 hours  chlorhexidine 2% Cloths 1 Topical <User Schedule>  enoxaparin Injectable 40 SubCutaneous every 24 hours  polyethylene glycol 3350 17 Oral every 12 hours  senna 2 Oral at bedtime      WEIGHT  Weight (kg): 59 (08-14-23 @ 13:18)  Creatinine: <0.5 mg/dL (08-16-23 @ 06:37)      ANTIBIOTICS:  cefepime   IVPB 2000 milliGRAM(s) IV Intermittent every 12 hours      All available historical records have been reviewed

## 2023-08-16 NOTE — PHARMACOTHERAPY INTERVENTION NOTE - COMMENTS
Recommended de-escalating from cefepime to ceftriaxone, ordered empirically for suspected pneumonia, in accordance with ID consult recommendations.    Delano Edwards, PharmD  Clinical Pharmacy Specialist, Infectious Diseases  Tele-Antimicrobial Stewardship Program (Tele-ASP)  Tele-ASP Phone: [phone]  Recommended de-escalating from cefepime to ceftriaxone 2g IV q24h in accordance with ID consult recommendations.    Delano Edwards, PharmD  Clinical Pharmacy Specialist, Infectious Diseases  Tele-Antimicrobial Stewardship Program (Tele-ASP)  Tele-ASP Phone: [phone]

## 2023-08-16 NOTE — PROGRESS NOTE ADULT - ASSESSMENT
IMPRESSION:    Acute hypoxemic resp failure resolved  Pneumonia/ possible aspiration  Sepsis POA  fecal impaction  HO CVA ( bedbound/ non verbal)      PLAN:    CNS: Avoid CNS depressant    HEENT:  Oral care    PULMONARY:  HOB @ 45 degrees, aspiration precautions, NC, keep SaO2 92 TO 96%    CARDIOVASCULAR: dc IVF    GI: GI prophylaxis                                          Feeding NGT feeding    RENAL:  F/u  lytes.  Correct as needed. accurate I/O    INFECTIOUS DISEASE: abx per ID    HEMATOLOGICAL:  DVT prophylaxis.    ENDOCRINE:  Follow up FS.  Insulin protocol if needed.      GOC    Poor prognosis

## 2023-08-16 NOTE — SWALLOW BEDSIDE ASSESSMENT ADULT - ORAL PREPARATORY PHASE
Reduced oral grading
Reduced oral grading/Anterior loss of bolus/Lateral loss of bolus/Bolus falls into right lateral sulci

## 2023-08-17 LAB
ANION GAP SERPL CALC-SCNC: 9 MMOL/L — SIGNIFICANT CHANGE UP (ref 7–14)
BUN SERPL-MCNC: 11 MG/DL — SIGNIFICANT CHANGE UP (ref 10–20)
CALCIUM SERPL-MCNC: 8.2 MG/DL — LOW (ref 8.4–10.5)
CHLORIDE SERPL-SCNC: 103 MMOL/L — SIGNIFICANT CHANGE UP (ref 98–110)
CO2 SERPL-SCNC: 28 MMOL/L — SIGNIFICANT CHANGE UP (ref 17–32)
CREAT SERPL-MCNC: <0.5 MG/DL — LOW (ref 0.7–1.5)
EGFR: 104 ML/MIN/1.73M2 — SIGNIFICANT CHANGE UP
GLUCOSE SERPL-MCNC: 122 MG/DL — HIGH (ref 70–99)
HCT VFR BLD CALC: 32.6 % — LOW (ref 37–47)
HGB BLD-MCNC: 10.7 G/DL — LOW (ref 12–16)
MCHC RBC-ENTMCNC: 30.2 PG — SIGNIFICANT CHANGE UP (ref 27–31)
MCHC RBC-ENTMCNC: 32.8 G/DL — SIGNIFICANT CHANGE UP (ref 32–37)
MCV RBC AUTO: 92.1 FL — SIGNIFICANT CHANGE UP (ref 81–99)
NRBC # BLD: 0 /100 WBCS — SIGNIFICANT CHANGE UP (ref 0–0)
PLATELET # BLD AUTO: 187 K/UL — SIGNIFICANT CHANGE UP (ref 130–400)
PMV BLD: 10.6 FL — HIGH (ref 7.4–10.4)
POTASSIUM SERPL-MCNC: 3.5 MMOL/L — SIGNIFICANT CHANGE UP (ref 3.5–5)
POTASSIUM SERPL-SCNC: 3.5 MMOL/L — SIGNIFICANT CHANGE UP (ref 3.5–5)
RBC # BLD: 3.54 M/UL — LOW (ref 4.2–5.4)
RBC # FLD: 12.5 % — SIGNIFICANT CHANGE UP (ref 11.5–14.5)
SODIUM SERPL-SCNC: 140 MMOL/L — SIGNIFICANT CHANGE UP (ref 135–146)
WBC # BLD: 7.04 K/UL — SIGNIFICANT CHANGE UP (ref 4.8–10.8)
WBC # FLD AUTO: 7.04 K/UL — SIGNIFICANT CHANGE UP (ref 4.8–10.8)

## 2023-08-17 PROCEDURE — 99232 SBSQ HOSP IP/OBS MODERATE 35: CPT

## 2023-08-17 PROCEDURE — 71045 X-RAY EXAM CHEST 1 VIEW: CPT | Mod: 26

## 2023-08-17 PROCEDURE — 74230 X-RAY XM SWLNG FUNCJ C+: CPT | Mod: 26

## 2023-08-17 PROCEDURE — 74018 RADEX ABDOMEN 1 VIEW: CPT | Mod: 26

## 2023-08-17 RX ADMIN — ENOXAPARIN SODIUM 40 MILLIGRAM(S): 100 INJECTION SUBCUTANEOUS at 22:33

## 2023-08-17 RX ADMIN — ATORVASTATIN CALCIUM 20 MILLIGRAM(S): 80 TABLET, FILM COATED ORAL at 22:32

## 2023-08-17 RX ADMIN — Medication 650 MILLIGRAM(S): at 23:33

## 2023-08-17 RX ADMIN — Medication 650 MILLIGRAM(S): at 23:03

## 2023-08-17 RX ADMIN — POLYETHYLENE GLYCOL 3350 17 GRAM(S): 17 POWDER, FOR SOLUTION ORAL at 17:18

## 2023-08-17 RX ADMIN — SENNA PLUS 2 TABLET(S): 8.6 TABLET ORAL at 22:32

## 2023-08-17 RX ADMIN — CHLORHEXIDINE GLUCONATE 1 APPLICATION(S): 213 SOLUTION TOPICAL at 05:22

## 2023-08-17 RX ADMIN — Medication 1 ENEMA: at 14:03

## 2023-08-17 RX ADMIN — POLYETHYLENE GLYCOL 3350 17 GRAM(S): 17 POWDER, FOR SOLUTION ORAL at 05:19

## 2023-08-17 RX ADMIN — CEFTRIAXONE 100 MILLIGRAM(S): 500 INJECTION, POWDER, FOR SOLUTION INTRAMUSCULAR; INTRAVENOUS at 17:18

## 2023-08-17 NOTE — SWALLOW BEDSIDE ASSESSMENT ADULT - DIET PRIOR TO ADMI
puree, mildly thick liquids per sister at bedside (reports this diet was recommended while pt was in NH following stroke; states pt has not been seen recently by SLP)

## 2023-08-17 NOTE — SWALLOW VFSS/MBS ASSESSMENT ADULT - SLP PERTINENT HISTORY OF CURRENT PROBLEM
Pt is a 73 y/o F w/ PMHx: CVA (non-verbal and bedbound), hypothyroidism, hypercholesterolemia, bought to the ED from home by sister due to abdominal pain, fever and labored breathing. Of note, sister states pt was at [facility] recently d/t diarrhea/constipation. Pt is being treated for severe sepsis w/ lactic acidosis, aspiration vs. GN PNA. CT Chest/Abd/Pelvis-> B/L patchy airspace opacities with left lower lobe consolidation consistent with pneumonia in the appropriate clinical setting. Debris or secretions within trachea, with occlusion of small left lower lobe peripheral airway; correlate for aspiration Pt is a 71 y/o F w/ PMHx: CVA (non-verbal and bedbound), hypothyroidism, hypercholesterolemia, bought to the ED from home by sister due to abdominal pain, fever and labored breathing. Of note, sister states pt was at [facility] recently d/t diarrhea/constipation. Pt is being treated for severe sepsis w/ lactic acidosis, aspiration vs. GN PNA. CT Chest/Abd/Pelvis-> B/L patchy airspace opacities with left lower lobe consolidation consistent with pneumonia in the appropriate clinical setting. Debris or secretions within trachea, with occlusion of small left lower lobe peripheral airway; correlate for aspiration. Per family, pt w/ no prior instrumental swallow study.

## 2023-08-17 NOTE — SWALLOW BEDSIDE ASSESSMENT ADULT - SLP PERTINENT HISTORY OF CURRENT PROBLEM
Pt is a 71 y/o F w/ PMHx: CVA (non-verbal and bedbound), hypothyroidism, hypercholesterolemia, bought to the ED from home by sister due to abdominal pain, fever and labored breathing. Of note, sister states pt was at [facility] recently d/t diarrhea/constipation. Pt is being treated for severe sepsis w/ lactic acidosis, aspiration vs. GN PNA. CT Chest/Abd/Pelvis-> B/L patchy airspace opacities with left lower lobe consolidation consistent with pneumonia in the appropriate clinical setting. Debris or secretions within trachea, with occlusion of small left lower lobe peripheral airway; correlate for aspiration
Pt is a 73 y/o F w/ PMHx: CVA (non-verbal and bedbound), hypothyroidism, hypercholesterolemia, bought to the ED from home by sister due to abdominal pain, fever and labored breathing. Of note, sister states pt was at [facility] recently d/t diarrhea/constipation. Pt is being treated for AHRF, PNA, possible aspiration. CT Chest/Abd/Pelvis-> B/L patchy airspace opacities with left lower lobe consolidation consistent with pneumonia in the appropriate clinical setting. Debris or secretions within trachea, with occlusion of small left lower lobe peripheral airway; correlate for aspiration
Pt is a 73 y/o F w/ PMHx: CVA (non-verbal and bedbound), hypothyroidism, hypercholesterolemia, bought to the ED from home by sister due to abdominal pain, fever and labored breathing. Of note, sister states pt was at [facility] recently d/t diarrhea/constipation. Pt is being treated for severe sepsis w/ lactic acidosis, aspiration vs. GN PNA. CT Chest/Abd/Pelvis-> B/L patchy airspace opacities with left lower lobe consolidation consistent with pneumonia in the appropriate clinical setting. Debris or secretions within trachea, with occlusion of small left lower lobe peripheral airway; correlate for aspiration
Pt is a 71 y/o F w/ PMHx: CVA (non-verbal and bedbound), hypothyroidism, hypercholesterolemia, bought to the ED from home by sister due to abdominal pain, fever and labored breathing. Of note, sister states pt was at [facility] recently d/t diarrhea/constipation. Pt is being treated for AHRF, PNA, possible aspiration. CT Chest/Abd/Pelvis-> B/L patchy airspace opacities with left lower lobe consolidation consistent with pneumonia in the appropriate clinical setting. Debris or secretions within trachea, with occlusion of small left lower lobe peripheral airway; correlate for aspiration

## 2023-08-17 NOTE — PROGRESS NOTE ADULT - ASSESSMENT
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 was hypoxic at 86% on RA and altered. Pt. was placed in NRB 15L and O2 sat improved to 96%. Pt's sister states patient has congestion nose for the last few days with dry cough, in the ED T max 102.2F, , BP stable, labs showed lactate 3.6, CT chest showed left lower lobe consolidation, Admitted to SDU for closer monitoring     Acute Hypoxic respiratory failure: resolved   Left lower lobe pneumonia: gram negative v aspiration  Severe Sepsis on admission due to above, resolved   Recent URI   dysphagia and risk of aspiration  constipation  Stercoral colitis  r/u vesicovaginal fistula.  History of CVA, bedbound, non verbal   Functional quadriplegia:     Plans:    - currently on RA  - on Rocephin till 8/22 per ID  - Speech and swallow, recommended NPO. given mental status she will likley need PEG tube, needs S/S re eval,  - try disimpaction, c/w bowel regimen, avoid lactulose, daily KUB  - surgery eval to r/u fistula   - dvt ppx  - pending; resolving of constipation, Surgery eval, improve mental status and PO intake, might need PEG tube,  - plans discussed with her sister SHERWIN Fields at bedside     CODE Status DNR/DNI, overall very poor prognosis

## 2023-08-17 NOTE — PROGRESS NOTE ADULT - ASSESSMENT
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 was hypoxic at 86% on RA and altered. Pt. was placed in NRB 15L and O2 sat improved to 96%. Pt's sister states patient has congestion nose for the last few days with dry cough, in the ED T max 102.2F, , BP stable, labs showed lactate 3.6, CT chest showed left lower lobe consolidation, Admitted to SDU for closer monitoring     Acute Hypoxic respiratory failure: resolved   Left lower lobe pneumonia: gram negative v aspiration  Severe Sepsis on admission due to above, resolved   Recent URI   dysphagia and risk of aspiration  constipation  Stercoral colitis  r/u vesicovaginal fistula.  History of CVA:   Functional quadriplegia:     Plans:    - currently on RA  - on rocephin till 8/22 per ID  - Speech and swallow, recommended NPO. Family agreeable to NGT, will need PEG tube   - try disimpaction, c/w bowel regimen, avoid lactulose, daily KUB  - surgery eval to r/u fistula   - dvt ppx  - pending; resolving of constipation, Sugery eval, PEG tube discussion w family     CODE Status DNR/DNI, overall very poor prognosis

## 2023-08-17 NOTE — SWALLOW VFSS/MBS ASSESSMENT ADULT - ORAL PHASE
Delayed oral transit time/Reduced anterior - posterior transport/Residue in oral cavity/Incomplete tongue to palate contact/Uncontrolled bolus / spillover in tristen-pharynx/Uncontrolled bolus / spillover in hypopharynx/Laryngeal penetration before swallow - silent

## 2023-08-17 NOTE — SWALLOW VFSS/MBS ASSESSMENT ADULT - THE ABOVE FINDINGS WERE DISCUSSED WITH
Physician/Nursing/Family sister Tasha and pts cousin educated on results of study/Physician/Nursing/Family

## 2023-08-17 NOTE — SWALLOW VFSS/MBS ASSESSMENT ADULT - SLP GENERAL OBSERVATIONS
pt received in radiology suite awake in no apparent pain. +NGT in place +contracted +wrist restraint

## 2023-08-17 NOTE — PROGRESS NOTE ADULT - ASSESSMENT
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 was hypoxic at 86% on RA and altered. Pt. was placed in NRB 15L and O2 sat improved to 96%. Pt's sister states patient has congestion nose for the last few days with dry cough, in the ED T max 102.2F, , BP stable, labs showed lactate 3.6, CT chest showed left lower lobe consolidation, Admitted to SDU for closer monitoring.    #Acute Hypoxic respiratory failure:   #Left lower lobe pneumonia: gram negative v aspiration  #Severe Sepsis on admission; fever, tachycardia.   #Recent URI   Ct chest showed left lower lobe consolidation. Debris or secretions within trachea, with occlusion of small left lower lobe peripheral airway; correlate for aspiration  WBC 10K, lactate 3.6--->1.4  Speech and swallow, recommended NPO. Family agreeable to NGT   Started on Cefepime per ID,    procal  1.4  blood cx NGTD,   check urine strep/legionella  Aspiration precaution.   ID following     #Constipation.   - No oral laxatives  - Digital rectal disimpaction + Water enema. Provided       #Stercoral colitis   CT abdomen showed Large rectal fecal stool burden, 11 cm AP.   Contrast, presumably excreted, within vagina, suspicious for vesicovaginal fistula. Findings suggestive of mild colitis involving segment of descending colon; no abscess.  unable to manually disimpact today  monitoring for BM  start tap water enemas q2H x4 today.       History of CVA:   Functional quadriplegia:   Patient is bedbound  off loading    CODE Status DNR/DNI, overall very poor prognosis.   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 was hypoxic at 86% on RA and altered. Pt. was placed in NRB 15L and O2 sat improved to 96%. Pt's sister states patient has congestion nose for the last few days with dry cough, in the ED T max 102.2F, , BP stable, labs showed lactate 3.6, CT chest showed left lower lobe consolidation, Admitted to SDU for closer monitoring.    #Acute Hypoxic respiratory failure:   #Left lower lobe pneumonia: gram negative v aspiration  #Severe Sepsis on admission; fever, tachycardia.   #Recent URI   Ct chest showed left lower lobe consolidation. Debris or secretions within trachea, with occlusion of small left lower lobe peripheral airway; correlate for aspiration  WBC 10K, lactate 3.6--->1.4  Speech and swallow, recommended NPO. Family agreeable to NGT   Started on Cefepime per ID,    procal  1.4  blood cx NGTD,   check urine strep/legionella  Aspiration precaution.   ID following     #Constipation.   - No oral laxatives should be prescribed  - Digital rectal disimpaction + Water enema. Provided relief with important bowel movement    #Speech and Swallow consulted for prevention of recurrent aspiration. Recommending long term NPO  Recommended discussing goal of care with family. PEG vs Comfort feeds    #Stercoral colitis   CT abdomen showed Large rectal fecal stool burden, 11 cm AP.   Contrast, presumably excreted, within vagina, suspicious for vesicovaginal fistula. Findings suggestive of mild colitis involving segment of descending colon; no abscess.  unable to manually disimpact today  May need surgical consultation    History of CVA:   Functional quadriplegia:   Patient is bedbound  off loading    CODE Status DNR/DNI, overall very poor prognosis.

## 2023-08-17 NOTE — PROGRESS NOTE ADULT - SUBJECTIVE AND OBJECTIVE BOX
Patient is a 72y old  Female who presents with a chief complaint of SOB (16 Aug 2023 06:41)      OVERNIGHT EVENTS: no major events,   per house staff no reported denies fever, chills, syncope, seizure, headache, cough, SOB, abd pain, N/V/D, urinary symptoms, legs swelling,     SUBJECTIVE / INTERVAL HPI: Patient seen and examined at bedside.     VITAL SIGNS:  Vital Signs Last 24 Hrs  T(C): 36.7 (17 Aug 2023 05:29), Max: 37.4 (16 Aug 2023 20:00)  T(F): 98 (17 Aug 2023 05:29), Max: 99.3 (16 Aug 2023 20:00)  HR: 70 (17 Aug 2023 05:29) (70 - 83)  BP: 138/71 (17 Aug 2023 05:29) (122/62 - 138/71)  BP(mean): --  RR: 18 (17 Aug 2023 05:29) (18 - 20)  SpO2: 95% (17 Aug 2023 05:29) (95% - 100%)    Parameters below as of 16 Aug 2023 20:00  Patient On (Oxygen Delivery Method): room air        PHYSICAL EXAM:    General: old lady chronically look ill   HEENT: NC/AT; PERRL, clear conjunctiva  Neck: supple  Cardiovascular: +S1/S2; RRR  Respiratory: CTA b/l; no W/R/R  Gastrointestinal: soft, NT/ND; +BSx4  Extremities: WWP; 2+ peripheral pulses; no edema   Neurological: drowsy, not verbal, functional quadriplegia     MEDICATIONS:  MEDICATIONS  (STANDING):  atorvastatin 20 milliGRAM(s) Oral at bedtime  cefTRIAXone   IVPB 2000 milliGRAM(s) IV Intermittent every 24 hours  chlorhexidine 2% Cloths 1 Application(s) Topical <User Schedule>  enoxaparin Injectable 40 milliGRAM(s) SubCutaneous every 24 hours  polyethylene glycol 3350 17 Gram(s) Oral every 12 hours  saline laxative (FLEET) Rectal Enema 1 Enema Rectal once  senna 2 Tablet(s) Oral at bedtime    MEDICATIONS  (PRN):  acetaminophen     Tablet .. 650 milliGRAM(s) Oral every 6 hours PRN Moderate Pain (4 - 6)  bisacodyl Suppository 10 milliGRAM(s) Rectal daily PRN Constipation      ALLERGIES:  Allergies    No Known Allergies    Intolerances        LABS:                        10.7   7.04  )-----------( 187      ( 17 Aug 2023 09:03 )             32.6     08-17    140  |  103  |  11  ----------------------------<  122<H>  3.5   |  28  |  <0.5<L>    Ca    8.2<L>      17 Aug 2023 09:03    TPro  5.3<L>  /  Alb  3.1<L>  /  TBili  1.0  /  DBili  x   /  AST  23  /  ALT  16  /  AlkPhos  72  08-16      Urinalysis Basic - ( 17 Aug 2023 09:03 )    Color: x / Appearance: x / SG: x / pH: x  Gluc: 122 mg/dL / Ketone: x  / Bili: x / Urobili: x   Blood: x / Protein: x / Nitrite: x   Leuk Esterase: x / RBC: x / WBC x   Sq Epi: x / Non Sq Epi: x / Bacteria: x      CAPILLARY BLOOD GLUCOSE          RADIOLOGY & ADDITIONAL TESTS: Reviewed.    < from: Xray Kidney Ureter Bladder (08.17.23 @ 06:15) >  FINDINGS:    Enteric tube remains in the region of the gastric body. No dilated bowel   seen. Significant stool seen in the rectum similar to prior.    < end of copied text >  < from: Xray Chest 1 View- PORTABLE-Urgent (Xray Chest 1 View- PORTABLE-Urgent .) (08.17.23 @ 06:15) >  Impression:    NG tube passed beyond EG junction, tip not visualized.    No consolidation, effusion or pneumothorax.    --- End of Report -    < end of copied text >

## 2023-08-17 NOTE — SWALLOW VFSS/MBS ASSESSMENT ADULT - RECOMMENDED CONSISTENCY
NPO, pt will require long-term non-oral means of nutrition/hydration vs. palliative care w/ comfort feeds continue NPO w/ NGT, pt will require long-term non-oral means of nutrition/hydration vs. palliative care w/ comfort feeds continue NPO w/ NGT, pt will require long-term non-oral means of nutrition/hydration vs. palliative care w/ comfort feeds pending GOC discussion

## 2023-08-17 NOTE — SWALLOW BEDSIDE ASSESSMENT ADULT - SWALLOW EVAL: DIAGNOSIS
+suspected pharyngeal dysphagia for PO trials of puree and mildly thick liquids; +moderate oral dysphagia for mildly thick liquids
PO trials unsafe 2' poor arousability.
Plan for VFSS to assess candidacy for PO diet.
+suspected pharyngeal dysphagia for puree

## 2023-08-17 NOTE — PROGRESS NOTE ADULT - SUBJECTIVE AND OBJECTIVE BOX
24H events:    Patient is a 72y old Female who presents with a chief complaint of SOB (16 Aug 2023 06:41)    Primary diagnosis of Sepsis      Day 1:  Day 2:  Day 3:     Today is hospital day 3d. This morning patient was seen and examined at bedside, resting comfortably in bed.    No acute or major events overnight. Hemodynamically stable, tolerating oral diet, voiding appropriately with appropriate bowel movements.     Code Status:    Family communication:  Contact date:  Name of person contacted:  Relationship to patient:  Communication details:  What matters most:    PAST MEDICAL & SURGICAL HISTORY  Stroke    Hypothyroidism    Hypercholesteremia      SOCIAL HISTORY:  Social History:      ALLERGIES:  No Known Allergies    MEDICATIONS:  STANDING MEDICATIONS  atorvastatin 20 milliGRAM(s) Oral at bedtime  cefTRIAXone   IVPB 2000 milliGRAM(s) IV Intermittent every 24 hours  chlorhexidine 2% Cloths 1 Application(s) Topical <User Schedule>  enoxaparin Injectable 40 milliGRAM(s) SubCutaneous every 24 hours  polyethylene glycol 3350 17 Gram(s) Oral every 12 hours  senna 2 Tablet(s) Oral at bedtime    PRN MEDICATIONS  acetaminophen     Tablet .. 650 milliGRAM(s) Oral every 6 hours PRN  bisacodyl Suppository 10 milliGRAM(s) Rectal daily PRN    VITALS:   T(F): 98.6  HR: 89  BP: 142/65  RR: 18  SpO2: 95%    PHYSICAL EXAM:  GENERAL: NAD, lying in bed comfortably, obtunded, lethargic,  somnolent, cooperative, elderly  HEAD: NCAD, no hematoma or laceration   NECK: Supple, no neck stiffness/nuchal rigidity, no JVD    HEART: Regular rate and rhythm, normal S1/S2, no murmurs, heaves, thrills  LUNGS: No acute respiratory distress, clear b/l breath sounds, no wheezing, rales, rhonchi  ABDOMEN:  soft, non-tender, non-distented, + BS, no hepatosplenomegaly   EXTREMITIES: no rashes, extremities warm/dry, no cyanosis, no edema, ulcerations or ecchymosis  NERVOUS SYSTEM:  A&Ox3, CNII-XII intace, follows commands, answers questions appropriately   SKIN: No rashes or lesions       AMPAC score:    NG Tube Placement    (  ) Indwelling Pineda Catheter:   Date insterted:    Reason (  ) Critical illness     (  ) urinary retention    (  ) Accurate Ins/Outs Monitoring     (  ) CMO patient    (  ) Central Line:   Date inserted:  Location: (  ) Right IJ     (  ) Left IJ     (  ) Right Fem     (  ) Left Fem      LABS:                        10.7   7.04  )-----------( 187      ( 17 Aug 2023 09:03 )             32.6     08-17    140  |  103  |  11  ----------------------------<  122<H>  3.5   |  28  |  <0.5<L>    Ca    8.2<L>      17 Aug 2023 09:03    TPro  5.3<L>  /  Alb  3.1<L>  /  TBili  1.0  /  DBili  x   /  AST  23  /  ALT  16  /  AlkPhos  72  08-16      Urinalysis Basic - ( 17 Aug 2023 09:03 )    Color: x / Appearance: x / SG: x / pH: x  Gluc: 122 mg/dL / Ketone: x  / Bili: x / Urobili: x   Blood: x / Protein: x / Nitrite: x   Leuk Esterase: x / RBC: x / WBC x   Sq Epi: x / Non Sq Epi: x / Bacteria: x        RADIOLOGY:     24H events:    Patient is a 72y old Female who presents with a chief complaint of SOB (16 Aug 2023 06:41)    Primary diagnosis of Sepsis due to lower left lobe pneumonia    Patient initially admitted to step down unit    Today is hospital day 3d. This morning patient was seen and examined at bedside. Patient bedridden and nonverbal  No acute or major events overnight. Hemodynamically stable, tolerating oral diet, voiding appropriately with appropriate bowel movements.     Code Status: DNR    PAST MEDICAL & SURGICAL HISTORY  Stroke    Hypothyroidism    Hypercholesteremia    ALLERGIES:  No Known Allergies    MEDICATIONS:  STANDING MEDICATIONS  atorvastatin 20 milliGRAM(s) Oral at bedtime  cefTRIAXone   IVPB 2000 milliGRAM(s) IV Intermittent every 24 hours  chlorhexidine 2% Cloths 1 Application(s) Topical <User Schedule>  enoxaparin Injectable 40 milliGRAM(s) SubCutaneous every 24 hours  polyethylene glycol 3350 17 Gram(s) Oral every 12 hours  senna 2 Tablet(s) Oral at bedtime    PRN MEDICATIONS  acetaminophen     Tablet .. 650 milliGRAM(s) Oral every 6 hours PRN  bisacodyl Suppository 10 milliGRAM(s) Rectal daily PRN    VITALS:   T(F): 98.6  HR: 89  BP: 142/65  RR: 18  SpO2: 95%    PHYSICAL EXAM:  GENERAL: NAD, lying in bed, somnolent, non cooperative, elderly   HEART: Regular rate and rhythm, normal S1/S2, no murmurs, heaves, thrills  LUNGS: No acute respiratory distress, congested on auscultation  ABDOMEN:  soft, non-tender, non-distented  EXTREMITIES: no rashes, extremities warm/dry, no cyanosis, no edema, ulcerations or ecchymosis  NERVOUS SYSTEM:  Patient somnolent and non cooperative  SKIN: No rashes or lesions       [facility] score:    NG Tube Placement    (  ) Indwelling Pineda Catheter:   Date insterted:    Reason (  ) Critical illness     (  ) urinary retention    (  ) Accurate Ins/Outs Monitoring     (  ) CMO patient      LABS:                        10.7   7.04  )-----------( 187      ( 17 Aug 2023 09:03 )             32.6     08-17    140  |  103  |  11  ----------------------------<  122<H>  3.5   |  28  |  <0.5<L>    Ca    8.2<L>      17 Aug 2023 09:03    TPro  5.3<L>  /  Alb  3.1<L>  /  TBili  1.0  /  DBili  x   /  AST  23  /  ALT  16  /  AlkPhos  72  08-16      Urinalysis Basic - ( 17 Aug 2023 09:03 )    Color: x / Appearance: x / SG: x / pH: x  Gluc: 122 mg/dL / Ketone: x  / Bili: x / Urobili: x   Blood: x / Protein: x / Nitrite: x   Leuk Esterase: x / RBC: x / WBC x   Sq Epi: x / Non Sq Epi: x / Bacteria: x        RADIOLOGY:  On chest x-ray  NG tube passed beyond EG junction, tip not visualized.  No consolidation, effusion or pneumothorax.

## 2023-08-17 NOTE — SWALLOW BEDSIDE ASSESSMENT ADULT - ASR SWALLOW RECOMMEND DIAG
SLP to assess candidacy for instrumental swallow study
will plan for VFSS to further investigate pharyngeal swallow function/VFSS/MBS
VFSS/MBS

## 2023-08-17 NOTE — SWALLOW BEDSIDE ASSESSMENT ADULT - SLP GENERAL OBSERVATIONS
pt received in bed asleep arousable, able to maintain awakess. Pt in no apparent pain. +contracted +room air +NGT in place +restraints +sister at bedside

## 2023-08-17 NOTE — SWALLOW BEDSIDE ASSESSMENT ADULT - SWALLOW EVAL: RECOMMENDED DIET
continue NPO w/ NGT
continue NPO w/ non-oral means of nutrition/hydration
continue NPO w/ NGT
continue NPO w/ NGT

## 2023-08-18 PROCEDURE — 99232 SBSQ HOSP IP/OBS MODERATE 35: CPT

## 2023-08-18 RX ADMIN — POLYETHYLENE GLYCOL 3350 17 GRAM(S): 17 POWDER, FOR SOLUTION ORAL at 06:28

## 2023-08-18 RX ADMIN — CEFTRIAXONE 100 MILLIGRAM(S): 500 INJECTION, POWDER, FOR SOLUTION INTRAMUSCULAR; INTRAVENOUS at 18:14

## 2023-08-18 RX ADMIN — CHLORHEXIDINE GLUCONATE 1 APPLICATION(S): 213 SOLUTION TOPICAL at 06:29

## 2023-08-18 RX ADMIN — Medication 650 MILLIGRAM(S): at 22:24

## 2023-08-18 RX ADMIN — Medication 650 MILLIGRAM(S): at 22:30

## 2023-08-18 RX ADMIN — ENOXAPARIN SODIUM 40 MILLIGRAM(S): 100 INJECTION SUBCUTANEOUS at 22:26

## 2023-08-18 RX ADMIN — SENNA PLUS 2 TABLET(S): 8.6 TABLET ORAL at 22:24

## 2023-08-18 RX ADMIN — ATORVASTATIN CALCIUM 20 MILLIGRAM(S): 80 TABLET, FILM COATED ORAL at 22:24

## 2023-08-18 RX ADMIN — POLYETHYLENE GLYCOL 3350 17 GRAM(S): 17 POWDER, FOR SOLUTION ORAL at 18:28

## 2023-08-18 NOTE — PROGRESS NOTE ADULT - SUBJECTIVE AND OBJECTIVE BOX
ANNA COBOS  72y, Female  Allergy: No Known Allergies      LOS  4d    CHIEF COMPLAINT: Pneumonia (17 Aug 2023 15:53)      INTERVAL EVENTS/HPI  - No acute events overnight  - T(F): , Max: 99.4 (08-17-23 @ 21:06)  - no fevers -   - WBC Count: 7.04 (08-17-23 @ 09:03)  WBC Count: 8.42 (08-16-23 @ 06:37)     - Creatinine: <0.5 (08-17-23 @ 09:03)       ROS  General: Denies rigors, nightsweats  HEENT: Denies headache, rhinorrhea, sore throat, eye pain  CV: Denies CP, palpitations  PULM: Denies wheezing, hemoptysis  GI: Denies hematemesis, hematochezia, melena  : Denies discharge, hematuria  MSK: Denies arthralgias, myalgias  SKIN: Denies rash, lesions  NEURO: Denies paresthesias, weakness  PSYCH: Denies depression, anxiety    VITALS:  T(F): 97.4, Max: 99.4 (08-17-23 @ 21:06)  HR: 78  BP: 134/63  RR: 18Vital Signs Last 24 Hrs  T(C): 36.3 (18 Aug 2023 05:18), Max: 37.4 (17 Aug 2023 21:06)  T(F): 97.4 (18 Aug 2023 05:18), Max: 99.4 (17 Aug 2023 21:06)  HR: 78 (18 Aug 2023 05:18) (78 - 89)  BP: 134/63 (18 Aug 2023 05:18) (121/58 - 142/65)  BP(mean): --  RR: 18 (18 Aug 2023 05:18) (18 - 18)  SpO2: --        PHYSICAL EXAM:  Gen: NAD, resting in bed  HEENT: Normocephalic, atraumatic  Neck: supple, no lymphadenopathy  CV: Regular rate & regular rhythm  Lungs: decreased BS at bases, no fremitus  Abdomen: Soft, BS present  Ext: Warm, well perfused  Neuro: non focal, awake  Skin: no rash, no erythema  Lines: no phlebitis    FH: Non-contributory  Social Hx: Non-contributory    TESTS & MEASUREMENTS:                        10.7   7.04  )-----------( 187      ( 17 Aug 2023 09:03 )             32.6     08-17    140  |  103  |  11  ----------------------------<  122<H>  3.5   |  28  |  <0.5<L>    Ca    8.2<L>      17 Aug 2023 09:03          Urinalysis Basic - ( 17 Aug 2023 09:03 )    Color: x / Appearance: x / SG: x / pH: x  Gluc: 122 mg/dL / Ketone: x  / Bili: x / Urobili: x   Blood: x / Protein: x / Nitrite: x   Leuk Esterase: x / RBC: x / WBC x   Sq Epi: x / Non Sq Epi: x / Bacteria: x        Culture - Urine (collected 08-14-23 @ 15:20)  Source: Clean Catch Clean Catch (Midstream)  Final Report (08-15-23 @ 19:54):    <10,000 CFU/mL Normal Urogenital Dari    Culture - Blood (collected 08-14-23 @ 00:25)  Source: .Blood Blood-Peripheral  Preliminary Report (08-18-23 @ 09:00):    No growth at 4 days    Culture - Blood (collected 08-14-23 @ 00:25)  Source: .Blood Blood-Peripheral  Preliminary Report (08-18-23 @ 09:00):    No growth at 4 days        Lactate, Blood: 1.4 mmol/L (08-14-23 @ 11:35)  Blood Gas Venous - Lactate: 2.90 mmol/L (08-14-23 @ 03:37)  Lactate, Blood: 3.6 mmol/L (08-14-23 @ 00:25)  Blood Gas Venous - Lactate: 3.60 mmol/L (08-14-23 @ 00:03)      INFECTIOUS DISEASES TESTING  Procalcitonin, Serum: 1.38 (08-14-23 @ 11:35)      INFLAMMATORY MARKERS      RADIOLOGY & ADDITIONAL TESTS:  I have personally reviewed the last available Chest xray  CXR  Xray Chest 1 View- PORTABLE-Urgent:   ACC: [id number] EXAM:  XR CHEST PORTABLE URGENT 1V   ORDERED BY: MIRLANDE APODACA     PROCEDURE DATE:  08/17/2023          INTERPRETATION:  Clinical History/Reason for Exam:  NGT placement    Technique:  Frontal view the chest.    Comparison: Chest x-ray 8/16/2023.    Findings: Mandible overlies right apex obscuring anatomy.    Support devices:  NG tube passed beyond EG junction, tip not visualized.    Cardiac/mediastinum/hilum: Stable    Lung parenchyma/ Pleura: No focal parenchymal opacities, effusion or   pneumothorax is present.      Skeleton/soft tissues: Stable      Impression:    NG tube passed beyond EG junction, tip not visualized.    No consolidation, effusion or pneumothorax.    --- End of Report ---            ASAEL CHILDRESS MD; Attending Radiologist  This document has been electronically signed. Aug 17 2023  7:16AM (08-17-23 @ 06:15)      CT      CARDIOLOGY TESTING  12 Lead ECG:   Ventricular Rate 90 BPM    Atrial Rate 90 BPM    P-R Interval 160 ms    QRS Duration 74 ms    Q-T Interval 316 ms    QTC Calculation(Bazett) 386 ms    P Axis 67 degrees    R Axis 62 degrees    T Axis 43 degrees    Diagnosis Line Normal sinus rhythm  Baseline artifact  Probable Normal ECG    Confirmed by CHRISTIANO HARRIS, [facility] ([id number]) on 8/16/2023 10:46:15 PM (08-16-23 @ 14:12)  12 Lead ECG:   Ventricular Rate 112 BPM    Atrial Rate 112 BPM    P-R Interval 162 ms    QRS Duration 60 ms    Q-T Interval 310 ms    QTC Calculation(Bazett) 423 ms    P Axis 65 degrees    R Axis 68 degrees    T Axis 44 degrees    Diagnosis Line Sinus tachycardia  Low voltage QRS  Septal infarct , age undetermined  Abnormal ECG    Confirmed by LOUIS NOVA MD ([id number]) on 8/15/2023 7:36:14 AM (08-14-23 @ 02:50)      MEDICATIONS  atorvastatin 20 Oral at bedtime  cefTRIAXone   IVPB 2000 IV Intermittent every 24 hours  chlorhexidine 2% Cloths 1 Topical <User Schedule>  enoxaparin Injectable 40 SubCutaneous every 24 hours  polyethylene glycol 3350 17 Oral every 12 hours  senna 2 Oral at bedtime      WEIGHT  Weight (kg): 59 (08-14-23 @ 13:18)      ANTIBIOTICS:  cefTRIAXone   IVPB 2000 milliGRAM(s) IV Intermittent every 24 hours      All available historical records have been reviewed

## 2023-08-18 NOTE — PROGRESS NOTE ADULT - ASSESSMENT
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 was hypoxic at 86% on RA and altered. Pt. was placed in NRB 15L and O2 sat improved to 96%. Pt's sister states patient has congestion nose for the last few days with dry cough, in the ED T max 102.2F, , BP stable, labs showed lactate 3.6, CT chest showed left lower lobe consolidation, Admitted to SDU for closer monitoring.    #Acute Hypoxic respiratory failure:   #Left lower lobe pneumonia: gram negative v aspiration  #Severe Sepsis on admission; fever, tachycardia.   #Recent URI   Ct chest showed left lower lobe consolidation. Debris or secretions within trachea, with occlusion of small left lower lobe peripheral airway; correlate for aspiration  WBC 10K, lactate 3.6--->1.4  procal  1.4  blood cx NGTD,   Speech and swallow, recommended NPO.   Aspiration precaution.   ID following :   - continue ceftriaxone 2g daily   - plan for 7 days from last fever (end date 8/22)     #Colitis - possible stercoral colitis with fecal burden   - Digital rectal disimpaction + Water enema    #Possible vesicovaginal fistula   CT abdomen showed Large rectal fecal stool burden, 11 cm AP.   Contrast, presumably excreted, within vagina, suspicious for vesicovaginal fistula. Findings suggestive of mild colitis involving segment of descending colon; no abscess.  May need surgical consultation depending on GOC     #History of CVA:   #Functional quadriplegia:   Patient is bedbound  off loading    CODE Status DNR/DNI, overall very poor prognosis.    Hospice referral placed per family request. Advised against peg tube placement as it wont improve quality of life and wont improve mortality, family seems to agree

## 2023-08-18 NOTE — PROGRESS NOTE ADULT - SUBJECTIVE AND OBJECTIVE BOX
ANNA COBOS  72y, Female  Allergy: No Known Allergies    Hospital Day: 4d    Patient seen and examined earlier today. Resting comfortably     PMH/PSH:  PAST MEDICAL & SURGICAL HISTORY:  Stroke      Hypothyroidism      Hypercholesteremia          LAST 24-Hr EVENTS:    VITALS:  T(F): 97.4 (08-18-23 @ 13:55), Max: 99.4 (08-17-23 @ 21:06)  HR: 71 (08-18-23 @ 13:55)  BP: 120/58 (08-18-23 @ 13:55) (120/58 - 134/63)  RR: 18 (08-18-23 @ 13:55)  SpO2: --        TESTS & MEASUREMENTS:  Weight (Kg):   BMI (kg/m2): 26.3 (08-15)    08-16-23 @ 07:01  -  08-17-23 @ 07:00  --------------------------------------------------------  IN: 1525 mL / OUT: 0 mL / NET: 1525 mL    08-17-23 @ 07:01  -  08-18-23 @ 07:00  --------------------------------------------------------  IN: 949 mL / OUT: 600 mL / NET: 349 mL    08-18-23 @ 07:01  -  08-18-23 @ 18:30  --------------------------------------------------------  IN: 475 mL / OUT: 300 mL / NET: 175 mL                            10.7   7.04  )-----------( 187      ( 17 Aug 2023 09:03 )             32.6       08-17    140  |  103  |  11  ----------------------------<  122<H>  3.5   |  28  |  <0.5<L>    Ca    8.2<L>      17 Aug 2023 09:03              Culture - Urine (collected 08-14-23 @ 15:20)  Source: Clean Catch Clean Catch (Midstream)  Final Report (08-15-23 @ 19:54):    <10,000 CFU/mL Normal Urogenital Dari    Culture - Blood (collected 08-14-23 @ 00:25)  Source: .Blood Blood-Peripheral  Preliminary Report (08-18-23 @ 09:00):    No growth at 4 days    Culture - Blood (collected 08-14-23 @ 00:25)  Source: .Blood Blood-Peripheral  Preliminary Report (08-18-23 @ 09:00):    No growth at 4 days      Urinalysis Basic - ( 17 Aug 2023 09:03 )    Color: x / Appearance: x / SG: x / pH: x  Gluc: 122 mg/dL / Ketone: x  / Bili: x / Urobili: x   Blood: x / Protein: x / Nitrite: x   Leuk Esterase: x / RBC: x / WBC x   Sq Epi: x / Non Sq Epi: x / Bacteria: x      Procalcitonin, Serum: 1.38 ng/mL (08-14-23 @ 11:35)              RADIOLOGY, ECG, & ADDITIONAL TESTS:      RECENT DIAGNOSTIC ORDERS:      MEDICATIONS:  MEDICATIONS  (STANDING):  atorvastatin 20 milliGRAM(s) Oral at bedtime  cefTRIAXone   IVPB 2000 milliGRAM(s) IV Intermittent every 24 hours  chlorhexidine 2% Cloths 1 Application(s) Topical <User Schedule>  enoxaparin Injectable 40 milliGRAM(s) SubCutaneous every 24 hours  polyethylene glycol 3350 17 Gram(s) Oral every 12 hours  senna 2 Tablet(s) Oral at bedtime    MEDICATIONS  (PRN):  acetaminophen     Tablet .. 650 milliGRAM(s) Oral every 6 hours PRN Moderate Pain (4 - 6)  bisacodyl Suppository 10 milliGRAM(s) Rectal daily PRN Constipation      HOME MEDICATIONS:  atorvastatin 20 mg oral tablet (08-14)  gabapentin 100 mg oral tablet (08-14)  memantine 10 mg oral tablet (08-14)  pseudoephedrine 30 mg oral tablet (08-14)      PHYSICAL EXAM:  General: chronically ill looking   HEENT: NC/AT; PERRL, clear conjunctiva  Neck: supple  Cardiovascular: +S1/S2; RRR  Respiratory: CTA b/l; no W/R/R  Gastrointestinal: soft, NT/ND; +BSx4  Extremities: WWP; 2+ peripheral pulses; no edema   Neurological: not verbal, functional quadriplegia

## 2023-08-18 NOTE — PROGRESS NOTE ADULT - SUBJECTIVE AND OBJECTIVE BOX
24H events:    Patient is a 72y old Female who presents with a chief complaint of Pneumonia (17 Aug 2023 15:53)    Primary diagnosis of Sepsis      Day 1:  Day 2:  Day 3:     Today is hospital day 4d. This morning patient was seen and examined at bedside, resting comfortably in bed.    No acute or major events overnight. Hemodynamically stable, tolerating oral diet, voiding appropriately with appropriate bowel movements.     Code Status:    Family communication:  Contact date:  Name of person contacted:  Relationship to patient:  Communication details:  What matters most:    PAST MEDICAL & SURGICAL HISTORY  Stroke    Hypothyroidism    Hypercholesteremia      SOCIAL HISTORY:  Social History:      ALLERGIES:  No Known Allergies    MEDICATIONS:  STANDING MEDICATIONS  atorvastatin 20 milliGRAM(s) Oral at bedtime  cefTRIAXone   IVPB 2000 milliGRAM(s) IV Intermittent every 24 hours  chlorhexidine 2% Cloths 1 Application(s) Topical <User Schedule>  enoxaparin Injectable 40 milliGRAM(s) SubCutaneous every 24 hours  polyethylene glycol 3350 17 Gram(s) Oral every 12 hours  senna 2 Tablet(s) Oral at bedtime    PRN MEDICATIONS  acetaminophen     Tablet .. 650 milliGRAM(s) Oral every 6 hours PRN  bisacodyl Suppository 10 milliGRAM(s) Rectal daily PRN    VITALS:   T(F): 97.4  HR: 71  BP: 120/58  RR: 18  SpO2: --    PHYSICAL EXAM:  GENERAL: NAD, lying in bed comfortably, obtunded, lethargic,  somnolent, cooperative, elderly  HEAD: NCAD, no hematoma or laceration   NECK: Supple, no neck stiffness/nuchal rigidity, no JVD    HEART: Regular rate and rhythm, normal S1/S2, no murmurs, heaves, thrills  LUNGS: No acute respiratory distress, clear b/l breath sounds, no wheezing, rales, rhonchi  ABDOMEN:  soft, non-tender, non-distented, + BS, no hepatosplenomegaly   EXTREMITIES: no rashes, extremities warm/dry, no cyanosis, no edema, ulcerations or ecchymosis  NERVOUS SYSTEM:  A&Ox3, CNII-XII intace, follows commands, answers questions appropriately   SKIN: No rashes or lesions       AMPAC score:    (  ) Indwelling Pineda Catheter:   Date insterted:    Reason (  ) Critical illness     (  ) urinary retention    (  ) Accurate Ins/Outs Monitoring     (  ) CMO patient    (  ) Central Line:   Date inserted:  Location: (  ) Right IJ     (  ) Left IJ     (  ) Right Fem     (  ) Left Fem    (  ) SPC        (  ) pigtail       (  ) PEG tube       (  ) colostomy       (  ) jejunostomy  (  ) U-Dall    LABS:                        10.7   7.04  )-----------( 187      ( 17 Aug 2023 09:03 )             32.6     08-17    140  |  103  |  11  ----------------------------<  122<H>  3.5   |  28  |  <0.5<L>    Ca    8.2<L>      17 Aug 2023 09:03        Urinalysis Basic - ( 17 Aug 2023 09:03 )    Color: x / Appearance: x / SG: x / pH: x  Gluc: 122 mg/dL / Ketone: x  / Bili: x / Urobili: x   Blood: x / Protein: x / Nitrite: x   Leuk Esterase: x / RBC: x / WBC x   Sq Epi: x / Non Sq Epi: x / Bacteria: x                RADIOLOGY:      RADIOLOGY        PHYSICAL EXAM:  GENERAL:   (  ) NAD, lying in bed comfortably     (  ) obtunded     (  ) lethargic     (  ) somnolent    HEAD:   (  ) Atraumatic     (  ) hematoma     (  ) laceration (specify location:       )     NECK:  (  ) Supple     (  ) neck stiffness     (  ) nuchal rigidity     (  )  no JVD     (  ) JVD present ( -- cm)    HEART:  Rate -->     (  ) normal rate     (  ) bradycardic     (  ) tachycardic  Rhythm -->     (  ) regular     (  ) regularly irregular     (  ) irregularly irregular  Murmurs -->     (  ) normal s1s2     (  ) systolic murmur     (  ) diastolic murmur     (  ) continuous murmur      (  ) S3 present     (  ) S4 present    LUNGS:   (  )Unlabored respirations     (  ) tachypnea  (  ) B/L air entry     (  ) decreased breath sounds in:  (location     )    (  ) no adventitious sound     (  ) crackles     (  ) wheezing      (  ) rhonchi      (specify location:       )  (  ) chest wall tenderness (specify location:       )    ABDOMEN:   (  ) Soft     (  ) tense   |   (  ) nondistended     (  ) distended   |   (  ) +BS     (  ) hypoactive bowel sounds     (  ) hyperactive bowel sounds  (  ) nontender     (  ) RUQ tenderness     (  ) RLQ tenderness     (  ) LLQ tenderness     (  ) epigastric tenderness     (  ) diffuse tenderness  (  ) Splenomegaly      (  ) Hepatomegaly      (  ) Jaundice     (  ) ecchymosis     EXTREMITIES:  (  ) Normal     (  ) Rash     (  ) ecchymosis     (  ) varicose veins      (  ) pitting edema     (  ) non-pitting edema   (  ) ulceration     (  ) gangrene:     (location:     )    NERVOUS SYSTEM:    (  ) A&Ox3     (  ) confused     (  ) lethargic  CN II-XII:     (  ) Intact     (  ) deficits found     (Specify:     )   Upper extremities:     (  ) no sensorimotor deficits     (  ) weakness     (  ) loss of proprioception/vibration     (  ) loss of touch/temperature (specify:    )  Lower extremities:     (  ) no sensorimotor deficits     (  ) weakness     (  ) loss of proprioception/vibration     (  ) loss of touch/temperature (specify:    )    SKIN:   (  ) No rashes or lesions     (  ) maculopapular rash     (  ) pustules     (  ) vesicles     (  ) ulcer     (  ) ecchymosis     (specify location:     ) 24H events:    Patient is a 72y old Female who presents with a chief complaint of Pneumonia (17 Aug 2023 15:53)    Primary diagnosis of Sepsis        Today is hospital day 4d. This morning patient was seen and examined at bedside  Patient bedridden and non-verbal, more awake than yesterday.  Goal of care discussion was held with th family.  Hospice and palliative care consults placed for evaluation    Code Status: DNR/DNI      PAST MEDICAL & SURGICAL HISTORY  - Stroke  - Hypothyroidism  - Hypercholesteremia      ALLERGIES:  No Known Allergies    MEDICATIONS:  STANDING MEDICATIONS  atorvastatin 20 milliGRAM(s) Oral at bedtime  cefTRIAXone   IVPB 2000 milliGRAM(s) IV Intermittent every 24 hours  chlorhexidine 2% Cloths 1 Application(s) Topical <User Schedule>  enoxaparin Injectable 40 milliGRAM(s) SubCutaneous every 24 hours  polyethylene glycol 3350 17 Gram(s) Oral every 12 hours  senna 2 Tablet(s) Oral at bedtime    PRN MEDICATIONS  acetaminophen     Tablet .. 650 milliGRAM(s) Oral every 6 hours PRN  bisacodyl Suppository 10 milliGRAM(s) Rectal daily PRN    VITALS:   T(F): 97.4  HR: 71  BP: 120/58  RR: 18  SpO2: --    PHYSICAL EXAM:  GENERAL: NAD, lying in bed   HEART: Regular rate and rhythm, normal S1/S2   LUNGS: No acute respiratory distress, wheezing   ABDOMEN: soft and non-distented - tenderness could not be assessed as patient cannot verbalize  EXTREMITIES: no rashes, extremities warm/dry, no cyanosis, no edema, ulcerations or ecchymosis  NERVOUS SYSTEM:  residual left side weakness  SKIN: No rashes or lesions       (  ) Indwelling Pineda Catheter:   Date insterted:    Reason (  ) Critical illness     (  ) urinary retention    (  ) Accurate Ins/Outs Monitoring     (  ) CMO patient  LABS:                        10.7   7.04  )-----------( 187      ( 17 Aug 2023 09:03 )             32.6     08-17    140  |  103  |  11  ----------------------------<  122<H>  3.5   |  28  |  <0.5<L>    Ca    8.2<L>      17 Aug 2023 09:03        Urinalysis Basic - ( 17 Aug 2023 09:03 )    Color: x / Appearance: x / SG: x / pH: x  Gluc: 122 mg/dL / Ketone: x  / Bili: x / Urobili: x   Blood: x / Protein: x / Nitrite: x   Leuk Esterase: x / RBC: x / WBC x   Sq Epi: x / Non Sq Epi: x / Bacteria: x

## 2023-08-18 NOTE — PROGRESS NOTE ADULT - ASSESSMENT
Patient is a 72 year old Female with a PMHx of CVA (non-verbal and bedbound), hypothyroidism, hypercholesterolemia bought to the ED from home by sister due to abd pain/ fever and labored breathing. Patient septic on admission and found to have pneumonia with possible aspiration.    #Severe sepsis present on admission (Fever, HR>90 with lactic acidosis)   - Blood Cx 8/14 NG     #Aspiration vs GN pneumonia  - CT Chest/Abd/Pelvis showed Bilateral patchy airspace opacitiesh left lower lobe consolidation consistent with pneumonia in the appropriate clinical setting. Debris or secretions within trachea, with occlusion of small left lower lobe peripheral airway; correlate for aspiration. Large rectal fecal stool burden, 11 cm AP. Contrast, presumably excreted, within vagina, suspicious for vesicovaginal fistula. Findings suggestive of mild colitis involving segment of descending colon; no abscess.    #Colitis - possible stercoral colitis with fecal burden     Plan:  - continue ceftriaxone 2g daily   - plan for 7 days from last fever (end date 8/22)   - noted Speech and swallow/MBS assessment -- aspiration precautions     Please call or message on Microsoft Teams if with any questions.  Spectra [phone].

## 2023-08-18 NOTE — PROGRESS NOTE ADULT - ASSESSMENT
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 was hypoxic at 86% on RA and altered. Pt. was placed in NRB 15L and O2 sat improved to 96%. Pt's sister states patient has congestion nose for the last few days with dry cough, in the ED T max 102.2F, , BP stable, labs showed lactate 3.6, CT chest showed left lower lobe consolidation, Admitted to SDU for closer monitoring.    #Acute Hypoxic respiratory failure:   #Left lower lobe pneumonia: gram negative v aspiration  #Severe Sepsis on admission; fever, tachycardia.   #Recent URI   Ct chest showed left lower lobe consolidation. Debris or secretions within trachea, with occlusion of small left lower lobe peripheral airway; correlate for aspiration  WBC 10K, lactate 3.6--->1.4  procal  1.4  blood cx NGTD,   Speech and swallow, recommended NPO.   Aspiration precaution.   ID following :   - continue ceftriaxone 2g daily   - plan for 7 days from last fever (end date 8/22)     #Colitis - possible stercoral colitis with fecal burden   - Digital rectal disimpaction + Water enema    #Possible vesicovaginal fistula   CT abdomen showed Large rectal fecal stool burden, 11 cm AP.   Contrast, presumably excreted, within vagina, suspicious for vesicovaginal fistula. Findings suggestive of mild colitis involving segment of descending colon; no abscess.  May need surgical consultation depending on GOC     #History of CVA:   #Functional quadriplegia:   Patient is bedbound  off loading    CODE Status DNR/DNI, overall very poor prognosis.    Hospice referral placed per family request. Advised against peg tube placement as it wont improve quality of life and wont improve mortality, family seems to agree       Total time spent to complete patient's bedside assessment, review medical chart, discuss medical plan of care with covering medical team was more than 35 minutes  with >50% of time spent face to face with patient, discussion with patient/family and/or coordination of care      Marsha French,

## 2023-08-19 LAB
CULTURE RESULTS: SIGNIFICANT CHANGE UP
CULTURE RESULTS: SIGNIFICANT CHANGE UP
SPECIMEN SOURCE: SIGNIFICANT CHANGE UP
SPECIMEN SOURCE: SIGNIFICANT CHANGE UP

## 2023-08-19 PROCEDURE — 99232 SBSQ HOSP IP/OBS MODERATE 35: CPT

## 2023-08-19 RX ADMIN — CHLORHEXIDINE GLUCONATE 1 APPLICATION(S): 213 SOLUTION TOPICAL at 06:24

## 2023-08-19 RX ADMIN — POLYETHYLENE GLYCOL 3350 17 GRAM(S): 17 POWDER, FOR SOLUTION ORAL at 06:23

## 2023-08-19 RX ADMIN — CEFTRIAXONE 100 MILLIGRAM(S): 500 INJECTION, POWDER, FOR SOLUTION INTRAMUSCULAR; INTRAVENOUS at 22:11

## 2023-08-19 RX ADMIN — POLYETHYLENE GLYCOL 3350 17 GRAM(S): 17 POWDER, FOR SOLUTION ORAL at 18:00

## 2023-08-19 RX ADMIN — SENNA PLUS 2 TABLET(S): 8.6 TABLET ORAL at 22:12

## 2023-08-19 RX ADMIN — ENOXAPARIN SODIUM 40 MILLIGRAM(S): 100 INJECTION SUBCUTANEOUS at 22:19

## 2023-08-19 RX ADMIN — ATORVASTATIN CALCIUM 20 MILLIGRAM(S): 80 TABLET, FILM COATED ORAL at 22:12

## 2023-08-19 NOTE — PROGRESS NOTE ADULT - SUBJECTIVE AND OBJECTIVE BOX
*IM ATTENDING NOTE*      ANNA COBOS  72y  Female      Patient is a 72y old  Female who presents with a chief complaint of Pneumonia (18 Aug 2023 20:35)      INTERVAL HPI/OVERNIGHT EVENTS:  Pt lying in bed comfortably.     Vital Signs Last 24 Hrs  T(C): 36.7 (19 Aug 2023 12:15), Max: 36.8 (18 Aug 2023 20:50)  T(F): 98.1 (19 Aug 2023 12:15), Max: 98.3 (18 Aug 2023 20:50)  HR: 66 (19 Aug 2023 12:15) (64 - 78)  BP: 103/56 (19 Aug 2023 12:15) (103/56 - 139/68)  BP(mean): --  RR: 18 (19 Aug 2023 12:15) (18 - 18)  SpO2: --          08-18-23 @ 07:01  -  08-19-23 @ 07:00  --------------------------------------------------------  IN: 1525 mL / OUT: 1000 mL / NET: 525 mL        PHYSICAL EXAM:  General: chronically ill looking   HEENT: NC/AT; PERRL, clear conjunctiva  Neck: supple  Cardiovascular: +S1/S2; RRR  Respiratory: CTA b/l; no W/R/R  Gastrointestinal: soft, NT/ND; +BSx4  Extremities: WWP; 2+ peripheral pulses; no edema   Neurological: not verbal, functional quadriplegia     Consultant(s) Notes Reviewed:  [x ] YES  [ ] NO    Discussed with Consultants/Other Providers [ x] YES

## 2023-08-19 NOTE — PROGRESS NOTE ADULT - ASSESSMENT
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 was hypoxic at 86% on RA and altered. Pt. was placed in NRB 15L and O2 sat improved to 96%. Pt's sister states patient has congestion nose for the last few days with dry cough, in the ED T max 102.2F, , BP stable, labs showed lactate 3.6, CT chest showed left lower lobe consolidation, Admitted to SDU for closer monitoring.    #Acute Hypoxic respiratory failure:   #Left lower lobe pneumonia: gram negative v aspiration  #Severe Sepsis on admission; fever, tachycardia.   #Recent URI   Ct chest showed left lower lobe consolidation. Debris or secretions within trachea, with occlusion of small left lower lobe peripheral airway; correlate for aspiration  WBC 10K, lactate 3.6--->1.4  procal  1.4  blood cx NGTD,   Speech and swallow, recommended NPO.   Aspiration precaution.   ID following :   - continue ceftriaxone 2g daily   - plan for 7 days from last fever (end date 8/22)     #Colitis - possible stercoral colitis with fecal burden   - Digital rectal disimpaction + Water enema    #Possible vesicovaginal fistula   CT abdomen showed Large rectal fecal stool burden, 11 cm AP.   Contrast, presumably excreted, within vagina, suspicious for vesicovaginal fistula. Findings suggestive of mild colitis involving segment of descending colon; no abscess.  May need surgical consultation depending on GOC     #History of CVA:   #Functional quadriplegia:   Patient is bedbound  off loading    CODE Status DNR/DNI, overall very poor prognosis.    Hospice referral placed per family request. Advised against peg tube placement as it wont improve quality of life and wont improve mortality, family seems to agree. Hospice to follow up on Monday as family was overwhelmed.

## 2023-08-20 PROCEDURE — 99232 SBSQ HOSP IP/OBS MODERATE 35: CPT

## 2023-08-20 RX ADMIN — ATORVASTATIN CALCIUM 20 MILLIGRAM(S): 80 TABLET, FILM COATED ORAL at 21:09

## 2023-08-20 RX ADMIN — CEFTRIAXONE 100 MILLIGRAM(S): 500 INJECTION, POWDER, FOR SOLUTION INTRAMUSCULAR; INTRAVENOUS at 21:09

## 2023-08-20 RX ADMIN — CHLORHEXIDINE GLUCONATE 1 APPLICATION(S): 213 SOLUTION TOPICAL at 06:30

## 2023-08-20 RX ADMIN — ENOXAPARIN SODIUM 40 MILLIGRAM(S): 100 INJECTION SUBCUTANEOUS at 21:11

## 2023-08-20 RX ADMIN — SENNA PLUS 2 TABLET(S): 8.6 TABLET ORAL at 21:09

## 2023-08-20 NOTE — CHART NOTE - NSCHARTNOTEFT_GEN_A_CORE
Registered Dietitian Follow-Up     Patient Profile Reviewed                           Yes [x]   No []     Nutrition History Previously Obtained        Yes [x]  No []       Pertinent Subjective Information:  Spoke with RN regarding EN - patient is tolerating feeds well. 2x BM last night      Pertinent Medical Interventions:  Acute Hypoxic respiratory failure; left lower lobe pneumonia: gram negative v aspiration; severe sepsis on admission; recent URI; Ct chest showed left lower lobe consolidation. Debris or secretions within trachea, with occlusion of small left lower lobe peripheral airway; correlate for aspiration; Colitis - possible stercoral colitis with fecal burden - digital rectal disimpaction +water enema; possible vesicovaginal fistula; CT abdomen showed Large rectal fecal stool burden; May need surgical consultation depending on GOC.    Hospice referral placed per family request. Advised against peg tube placement as it wont improve quality of life and wont improve mortality, family seems to agree. Hospice to follow up on Monday as family was overwhelmed.       Diet order:   Diet, NPO with Tube Feed:   Tube Feeding Modality: Nasogastric  Jevity 1.2 Pepito  Total Volume for 24 Hours (mL): 1125  Bolus  Total Volume of Bolus (mL):  375  Total # of Feeds: 3  Tube Feed Frequency: Every 8 hours   Tube Feed Start Time: 20:00  Bolus Feed Rate (mL per Hour): 375   Bolus Feed Duration (in Hours): 8  Bolus Feed Instructions:  Would recommend to advance as tolerated  on day 2  to goal rate of 375 mL q8hrs  Free Water Flush  Free Water Flush Instructions:  100 mL PRE / POST feeds (08-15-23 @ 14:36) [Active]    Anthropometrics:  Height (cm): 149.9 (08-15-23 @ 10:18)  Weight (kg): 59 (23 @ 13:18)  BMI (kg/m2): 26.3 (08-15-23 @ 10:18)  IBW: 43.2 kg     Daily Weight in k (16), Weight in k (), Weight in k ()  % Weight Change    MEDICATIONS  (STANDING):  atorvastatin 20 milliGRAM(s) Oral at bedtime  cefTRIAXone   IVPB 2000 milliGRAM(s) IV Intermittent every 24 hours  chlorhexidine 2% Cloths 1 Application(s) Topical <User Schedule>  enoxaparin Injectable 40 milliGRAM(s) SubCutaneous every 24 hours  polyethylene glycol 3350 17 Gram(s) Oral every 12 hours  senna 2 Tablet(s) Oral at bedtime    MEDICATIONS  (PRN):  acetaminophen     Tablet .. 650 milliGRAM(s) Oral every 6 hours PRN Moderate Pain (4 - 6)  bisacodyl Suppository 10 milliGRAM(s) Rectal daily PRN Constipation    Pertinent Labs:   Na 140, K+ 3.5, Chloride 103, BUN 11, Cr <0.5 (L), Gluc 122 (H), Ca 8.2 (L)    Physical Findings:  - Appearance: unable to speak; no edema indicated   - GI function: fecal incontinence; large BM , loose  - Tubes: +NGT  - Oral/Mouth cavity: NPO w/ TF via NGT  - Skin: no pressure injuries indicated     Nutrition Requirements:  Weight Used: 59 kg      Estimated Energy Needs    Continue [x]  Adjust []  1207 - 1308 kcal/day (MSJ x 1.2 - 1.3 SF)      Estimated Protein Needs    Continue [x]  Adjust []  59 - 71 gm/day (1.0 - 1.2 gm/kg)      Estimated Fluid Needs        Continue [x]  Adjust []  1475 - 1770 mL/day (25 - 30 mL/kg)      Nutrient Intake: Current EN regimen provides:   1350 kcal, 68 gm Protein, 911 mL free water (+ recommend 100 mL flushes pre/post feeds) = 1511 mL total water     [x] Previous Nutrition Diagnosis:  Inadequate Oral Intake            [x] Ongoing          [] Resolved     Nutrition Intervention:  EN, coordination of care     Goal/Expected Outcome:   Patient to continue to meet >85% of estimated needs      Indicator/Monitoring:   EN tolerance, BM/GI s/s, weight, labs, skin status, NFPE     Recommendation:  1) Continue current EN regimen as it is appropriate and well tolerated  2) Recommend to adjust bowel regimen as patient noted with fecal incontinence and loose stools multiple times    Patient is at high nutrition risk, RD to f/u in 3-5 days or PRN    RD to remain available: Frances Leung RD [phone] or via Teams

## 2023-08-20 NOTE — PROGRESS NOTE ADULT - ASSESSMENT
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 was hypoxic at 86% on RA and altered. Pt. was placed in NRB 15L and O2 sat improved to 96%. Pt's sister states patient has congestion nose for the last few days with dry cough, in the ED T max 102.2F, , BP stable, labs showed lactate 3.6, CT chest showed left lower lobe consolidation, Admitted to SDU for closer monitoring.    #Acute Hypoxic respiratory failure:   #Left lower lobe pneumonia: gram negative v aspiration  #Severe Sepsis on admission; fever, tachycardia.   #Recent URI   Ct chest showed left lower lobe consolidation. Debris or secretions within trachea, with occlusion of small left lower lobe peripheral airway; correlate for aspiration  WBC 10K, lactate 3.6--->1.4  procal  1.4  blood cx NGTD,   Speech and swallow, recommended NPO.   Aspiration precaution.   ID following :   - continue ceftriaxone 2g daily   - plan for 7 days from last fever (end date 8/22)     #Colitis - possible stercoral colitis with fecal burden   - Digital rectal disimpaction + Water enema    #Possible vesicovaginal fistula   CT abdomen showed Large rectal fecal stool burden, 11 cm AP.   Contrast, presumably excreted, within vagina, suspicious for vesicovaginal fistula. Findings suggestive of mild colitis involving segment of descending colon; no abscess.  May need surgical consultation depending on GOC     #History of CVA:   #Functional quadriplegia:   # Dysphagia:  Patient is bedbound  off loading  on NG feeds     CODE Status DNR/DNI, overall very poor prognosis.    Hospice referral placed per family request. Advised against peg tube placement as it wont improve quality of life and wont improve mortality, family seems to agree. Hospice to follow up on Monday as family was overwhelmed.

## 2023-08-20 NOTE — PROGRESS NOTE ADULT - SUBJECTIVE AND OBJECTIVE BOX
SUBJECTIVE:    Patient is a 72y old Female who presents with a chief complaint of Pneumonia (18 Aug 2023 20:35)    Currently admitted to medicine with the primary diagnosis of Sepsis       INTERVAL HPI/OVERNIGHT EVENTS:  Pt lying in bed comfortably.     PAST MEDICAL & SURGICAL HISTORY  Stroke    Hypothyroidism    Hypercholesteremia      SOCIAL HISTORY:  Negative for smoking/alcohol/drug use.     ALLERGIES:  No Known Allergies    MEDICATIONS:  STANDING MEDICATIONS  atorvastatin 20 milliGRAM(s) Oral at bedtime  cefTRIAXone   IVPB 2000 milliGRAM(s) IV Intermittent every 24 hours  chlorhexidine 2% Cloths 1 Application(s) Topical <User Schedule>  enoxaparin Injectable 40 milliGRAM(s) SubCutaneous every 24 hours  polyethylene glycol 3350 17 Gram(s) Oral every 12 hours  senna 2 Tablet(s) Oral at bedtime    PRN MEDICATIONS  acetaminophen     Tablet .. 650 milliGRAM(s) Oral every 6 hours PRN  bisacodyl Suppository 10 milliGRAM(s) Rectal daily PRN    VITALS:   T(F): 98.4  HR: 82  BP: 126/66  RR: 18  SpO2: --       PHYSICAL EXAM:  GEN: No acute distress, ill looking, NG in place   LUNGS: Clear to auscultation bilaterally   HEART: S1/S2 present. RRR.   ABD: Soft, non-tender, non-distended. Bowel sounds present  EXT: NO lE edema

## 2023-08-21 DIAGNOSIS — Z71.89 OTHER SPECIFIED COUNSELING: ICD-10-CM

## 2023-08-21 DIAGNOSIS — Z51.5 ENCOUNTER FOR PALLIATIVE CARE: ICD-10-CM

## 2023-08-21 DIAGNOSIS — A41.9 SEPSIS, UNSPECIFIED ORGANISM: ICD-10-CM

## 2023-08-21 DIAGNOSIS — R53.2 FUNCTIONAL QUADRIPLEGIA: ICD-10-CM

## 2023-08-21 DIAGNOSIS — K52.9 NONINFECTIVE GASTROENTERITIS AND COLITIS, UNSPECIFIED: ICD-10-CM

## 2023-08-21 PROCEDURE — 99223 1ST HOSP IP/OBS HIGH 75: CPT

## 2023-08-21 PROCEDURE — 71045 X-RAY EXAM CHEST 1 VIEW: CPT | Mod: 26

## 2023-08-21 PROCEDURE — 99232 SBSQ HOSP IP/OBS MODERATE 35: CPT

## 2023-08-21 RX ADMIN — ATORVASTATIN CALCIUM 20 MILLIGRAM(S): 80 TABLET, FILM COATED ORAL at 21:33

## 2023-08-21 RX ADMIN — SENNA PLUS 2 TABLET(S): 8.6 TABLET ORAL at 21:29

## 2023-08-21 RX ADMIN — CEFTRIAXONE 100 MILLIGRAM(S): 500 INJECTION, POWDER, FOR SOLUTION INTRAMUSCULAR; INTRAVENOUS at 17:18

## 2023-08-21 RX ADMIN — CHLORHEXIDINE GLUCONATE 1 APPLICATION(S): 213 SOLUTION TOPICAL at 05:07

## 2023-08-21 RX ADMIN — ENOXAPARIN SODIUM 40 MILLIGRAM(S): 100 INJECTION SUBCUTANEOUS at 21:29

## 2023-08-21 NOTE — PROGRESS NOTE ADULT - ASSESSMENT
72 year old woman with a PMH of CVA (non-verbal and bedbound) with residual upper and LE weakness, bed bound, hypothyroidism, hypercholesterolemia bought to the ED from home by sister for SOB and fever, When EMS arrived on scene, pt. was hypoxic at 86% on RA and altered. Pt. was placed in NRB 15L and O2 sat improved to 96%. Pt's sister states patient has nasal congestion for the last few days with dry cough. In the ED, T max 102.2F, , BP stable, labs showed lactate 3.6, CT Chest showed left lower lobe consolidation. Initially, admitted to SDU for closer monitoring.    # family has opted for hospice  hospice noted  pall care noted  pt is basically CMO (not officially) - I see no reason for labs or studies at this point  below should be read in this context of hospice    # Acute Hypoxic respiratory failure (resolved) 2/2 Left lower lobe pneumonia: suspect gram negative bacteria causing severe sepsis present on admission (fever, tachycardia); Recent URI   Ct chest showed left lower lobe consolidation.   lactate 3.6--->1.4  procal 1.4  blood cx NGTD  ID noted  abx: rocephin 2gm iv q24 til 8/22  on RA    # Speech and swallow: recommended NPO.   Aspiration precaution.   pt never wanted PEG, thus d/c NGT  family wanted to try pleasure feeds, but pt does NOT seem awake enough as of now    # Colitis - possible stercoral colitis with fecal burden   prev had Digital rectal disimpaction + Water enema    # Possible vesicovaginal fistula   CT abdomen showed Large rectal fecal stool burden, 11 cm AP.   Contrast, presumably excreted, within vagina, suspicious for vesicovaginal fistula. Findings suggestive of mild colitis involving segment of descending colon; no abscess.  not going to intervene surg given hospice status    # History of CVA; Functional quadriplegia; Dysphagia; non-verbal  Patient is bedbound  off loading    # CODE Status DNR/DNI    Dispo: hospice/pall care f/u; avoid labs/studies; off NGT  eventually, pt will go home on hospice - f/u CM - stable for d/c

## 2023-08-21 NOTE — PROGRESS NOTE ADULT - ASSESSMENT
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 was hypoxic at 86% on RA and altered. Pt. was placed in NRB 15L and O2 sat improved to 96%. Pt's sister states patient has congestion nose for the last few days with dry cough, in the ED T max 102.2F, , BP stable, labs showed lactate 3.6, CT chest showed left lower lobe consolidation, Admitted to SDU for closer monitoring.    #Acute Hypoxic respiratory failure:   #Left lower lobe pneumonia: gram negative v aspiration  #Severe Sepsis on admission; fever, tachycardia.   #Recent URI   Ct chest showed left lower lobe consolidation. Debris or secretions within trachea, with occlusion of small left lower lobe peripheral airway; correlate for aspiration  WBC 10K, lactate 3.6--->1.4  procal  1.4  blood cx NGTD,   Speech and swallow, recommended NPO.   Aspiration precaution.   ID following :   - continue ceftriaxone 2g daily   - plan for 7 days from last fever (end date 8/22)     #Colitis - possible stercoral colitis with fecal burden   - Digital rectal disimpaction + Water enema    #Possible vesicovaginal fistula   CT abdomen showed Large rectal fecal stool burden, 11 cm AP.   Contrast, presumably excreted, within vagina, suspicious for vesicovaginal fistula. Findings suggestive of mild colitis involving segment of descending colon; no abscess.  May need surgical consultation depending on GOC     #History of CVA:   #Functional quadriplegia:   # Dysphagia:  Patient is bedbound  off loading  Comfort feeds  HCP is speaking with hospice, plan is for home hospice Thursday. Speaking with family today about CMO     CODE Status DNR/DNI, overall very poor prognosis.

## 2023-08-21 NOTE — PROGRESS NOTE ADULT - SUBJECTIVE AND OBJECTIVE BOX
ANNA COBOS  72y  Female  ***My note supersedes ALL resident notes that I sign.  My corrections for their notes are in my note.***    I can be reached directly on ideeli. My office number is [phone]. My personal cell number is [phone].    INTERVAL EVENTS: Here for f/u of CVA. Pt is non-verbal. She wakes up briefly to a tap on the leg. She does not interact. Pt is not eating. NGT is out.    T(F): 98.5 (08-21-23 @ 05:36), Max: 99.1 (08-20-23 @ 20:30)  HR: 69 (08-21-23 @ 05:36) (69 - 83)  BP: 97/53 (08-21-23 @ 05:36) (97/53 - 105/68)  RR: 18 (08-21-23 @ 05:36) (18 - 18)  SpO2: 94% (08-20-23 @ 20:30) (94% - 94%)    Gen: NAD  HEENT: PERRL, nose clr; no NGT  Neck: no nodes, no JVD, thyroid nl  lungs: clr  hrt: s1 s2 rrr no murmur  abd: soft, NT/ND, no HS megaly; no PEG  ext: no edema, no c/c  neuro: somewhat arousable to leg tap; non-verbal; seems functionally quadriplegic  : primafit    LABS:    RADIOLOGY & ADDITIONAL TESTS:    MEDICATIONS:  cefTRIAXone   IVPB 2000 milliGRAM(s) IV Intermittent every 24 hours    acetaminophen     Tablet .. 650 milliGRAM(s) Oral every 6 hours PRN  atorvastatin 20 milliGRAM(s) Oral at bedtime  bisacodyl Suppository 10 milliGRAM(s) Rectal daily PRN  chlorhexidine 2% Cloths 1 Application(s) Topical <User Schedule>  enoxaparin Injectable 40 milliGRAM(s) SubCutaneous every 24 hours  polyethylene glycol 3350 17 Gram(s) Oral every 12 hours  senna 2 Tablet(s) Oral at bedtime

## 2023-08-21 NOTE — PROGRESS NOTE ADULT - SUBJECTIVE AND OBJECTIVE BOX
24-Hour Events:    Patient is a 72y old Female who presents with a chief complaint of Pneumonia (18 Aug 2023 20:35)    Primary diagnosis of Sepsis    Today is hospital day 7d. Patient removed NG tube but was replaced by night team.    CODE STATUS:    FAMILY COMMUNICATION:  Contact date:  Name of person contacted:  Relationship to patient:  Communication details:  What matters most:    PAST MEDICAL & SURGICAL HISTORY  Stroke    Hypothyroidism    Hypercholesteremia      SOCIAL HISTORY:  Social History:      ALLERGIES:  No Known Allergies    MEDICATIONS:  STANDING MEDICATIONS  atorvastatin 20 milliGRAM(s) Oral at bedtime  cefTRIAXone   IVPB 2000 milliGRAM(s) IV Intermittent every 24 hours  chlorhexidine 2% Cloths 1 Application(s) Topical <User Schedule>  enoxaparin Injectable 40 milliGRAM(s) SubCutaneous every 24 hours  polyethylene glycol 3350 17 Gram(s) Oral every 12 hours  senna 2 Tablet(s) Oral at bedtime    PRN MEDICATIONS  acetaminophen     Tablet .. 650 milliGRAM(s) Oral every 6 hours PRN  bisacodyl Suppository 10 milliGRAM(s) Rectal daily PRN    VITALS:   T(F): 98.5  HR: 69  BP: 97/53  RR: 18  SpO2: 94%    PHYSICAL EXAM:  GENERAL:   ( X ) NAD, lying in bed comfortably     (  ) Obtunded     (  ) Lethargic     (  ) Somnolent    HEAD:   ( X ) Atraumatic     (  ) Hematoma     (  ) laceration (Specify Location:       )     NECK:  (  ) Supple     (  ) Neck stiffness     (  ) Nuchal Rigidity     (  )  no JVD     (  ) JVD present ( -- cm)    HEART:  Rate -->     ( X ) Normal rate     (  ) Bradycardic     (  ) Tachycardic  Rhythm -->     ( X )Regular     (  ) Regularly Irregular     (  ) Irregularly Irregular  Murmurs -->     ( X ) Normal S1 S2     (  ) Systolic Murmur     (  ) Diastolic Murmur     (  ) Continuous Murmur      (  ) S3 present     (  ) S4 present    LUNGS:   ( X ) Unlabored respirations     (  ) tachypnea  ( X ) B/L air entry     (  ) decreased breath sounds in:  (location     )    (  ) no adventitious sound     (  ) crackles     (  ) wheezing      (  ) rhonchi      (specify location:        )  (  ) chest wall tenderness (specify location:       )    ABDOMEN:   ( X ) Soft     (  ) tense   |   ( X ) nondistended     (  ) distended   |   (  ) +BS     (  ) hypoactive bowel sounds     (  ) hyperactive bowel sounds  ( X ) nontender     (  ) RUQ tenderness     (  ) RLQ tenderness     (  ) LLQ tenderness     (  ) epigastric tenderness     (  ) diffuse tenderness  (  ) Splenomegaly      (  ) Hepatomegaly      (  ) Jaundice     (  ) ecchymosis     EXTREMITIES:  (  ) Normal     (  ) Rash     (  ) ecchymosis     (  ) varicose veins      (  ) pitting edema     (  ) non-pitting edema   (  ) Ulceration     (  ) gangrene:     (location:     )    NERVOUS SYSTEM:    (  ) A&Ox3     (  ) confused     (  ) lethargic Patient is nonverbal and bedbound at [facility]  CN II-XII:     (  ) Intact     (  ) deficits found     (Specify:     )   Upper extremities:     (  ) no sensorimotor deficits     (  ) weakness     (  ) loss of proprioception/vibration     (  ) loss of touch/temperature (specify:    )  Lower extremities:     (  ) no sensorimotor deficits     (  ) weakness     (  ) loss of proprioception/vibration     (  ) loss of touch/temperature (specify:    )    SKIN:   (  ) No rashes or lesions     (  ) maculopapular rash     (  ) pustules     (  ) vesicles     (  ) ulcer     (  ) ecchymosis     (specify location:     )    AMPAC score:    (  ) Indwelling Pineda Catheter:  Date inserted:    Reason (  ) Critical Illness     (  ) urinary retention    (  ) Accurate Ins/Outs Monitoring     (  ) CMO patient    (  ) Central Line:   Date inserted:  Location: (  ) Right IJ     (  ) Left IJ     (  ) Right Fem     (  ) Left Fem    (  ) SPC        (  ) Pigtail       (  ) PEG tube       (  ) Colostomy       (  ) Jejunostomy  (  ) U-Dall    LABS:                        RADIOLOGY:

## 2023-08-21 NOTE — HOSPICE CARE NOTE - CONVESATION DETAILS
Active communication with patient's sister Tasha on d/c planning with home hospice services. Tasha declines SNF placement as her goal is to take her sister home. Hospice reviewing current DME in home to ensure it is from a vendor that [facility] contracts with. Tasha to coordinate an increase in [facility]HA hours through current [facility] to assist with care at home. Hospice dispo ongoing at this time.

## 2023-08-21 NOTE — CONSULT NOTE ADULT - CONVERSATION DETAILS
Discussed with sister Tasha, she would like to continue current care, aware of comfort feeds, would like to c/w IV abx. Awaiting hospice.

## 2023-08-21 NOTE — CONSULT NOTE ADULT - SUBJECTIVE AND OBJECTIVE BOX
HPI: 72F with PMH including CVA (nonverbal, bedbound) with RUE and RLE weakness, hypothyroidism, HLD here with SOB and fever, and found to have severe sepsis from LLL PNA, started on ceftriaxone and aspiration precautions. Had enemas and disimpaction for stercoral colitis, and noted to have possible vesicovaginal distula. Patient is DNR/DNI, and family leaning towards comfort care. Palliative consulted for GOC, hospice referral made.    PERTINENT PM/SXH:   Stroke    Hypothyroidism    Hypercholesteremia    FAMILY HISTORY: cannot obtain from patient    ITEMS NOT CHECKED ARE NOT PRESENT    SOCIAL HISTORY:   Significant other/partner[ ]  Children[ ]  Mormon/Spirituality:  Substance hx:  [ ]   Tobacco hx:  [ ]   Alcohol hx: [ ]   Living Situation: [X ]Home  [ ]Long term care  [ ]Rehab [ ]Other  Home Services: [ ] HHA [ ] Visting RN [ ] Hospice  Occupation:  Home Opioid hx:  [ ] Y [ ] N [ ] I-Stop Reference No:    ADVANCE DIRECTIVES:    [ ] Full Code [X ] DNR  MOLST  [ ]  Living Will  [ ]   DECISION MAKER(s):  [ ] Health Care Proxy(s)  [ ] Surrogate(s)  [ ] Guardian           Name(s): Phone Number(s): sister Tasha [phone]    BASELINE (I)ADL(s) (prior to admission):  [place]: [ ]Total  [ ] Moderate [ ]Dependent  Palliative Performance Status Version 2:         %    http://npcrc.org/files/news/palliative_performance_scale_ppsv2.pdf    Allergies    No Known Allergies    Intolerances    MEDICATIONS  (STANDING):  atorvastatin 20 milliGRAM(s) Oral at bedtime  cefTRIAXone   IVPB 2000 milliGRAM(s) IV Intermittent every 24 hours  chlorhexidine 2% Cloths 1 Application(s) Topical <User Schedule>  enoxaparin Injectable 40 milliGRAM(s) SubCutaneous every 24 hours  polyethylene glycol 3350 17 Gram(s) Oral every 12 hours  senna 2 Tablet(s) Oral at bedtime    MEDICATIONS  (PRN):  acetaminophen     Tablet .. 650 milliGRAM(s) Oral every 6 hours PRN Moderate Pain (4 - 6)  bisacodyl Suppository 10 milliGRAM(s) Rectal daily PRN Constipation      PRESENT SYMPTOMS: [ ]Unable to obtain due to poor mentation   Source if other than patient:  [ ]Family   [ ]Team     Pain: [ ]yes [ ]no  QOL impact -   Location -                    Aggravating factors -  Quality -  Radiation -  Timing-  Severity (0-10 scale):  Minimal acceptable level (0-10 scale):     CPOT:    https://www.[facility].org/getattachment/[email or web address]/Critical-Care-Pain-Observation-Tool-(CPOT)    PAIN AD Score:   http://geriatrictoolkit.[facility]/cog/painad.pdf (press ctrl +  left click to view)    Dyspnea:                           [ ]None[ ]Mild [ ]Moderate [ ]Severe     Respiratory Distress Observation Scale (RDOS):   A score of 0 to 2 signifies little or no respiratory distress, 3 signifies mild distress, scores 4 to 6 indicate moderate distress, and scores greater than 7 signify severe distress  https://www.[facility].ca/sites/default/files/PDFS/756393-hujnqegpwyv-aukpnqjv-hygpdczdohf-mrkku.pdf    Anxiety:                             [ ]None[ ]Mild [ ]Moderate [ ]Severe   Fatigue:                             [ ]None[ ]Mild [ ]Moderate [ ]Severe   Nausea:                             [ ]None[ ]Mild [ ]Moderate [ ]Severe   Loss of appetite:              [ ]None[ ]Mild [ ]Moderate [ ]Severe   Constipation:                    [ ]None[ ]Mild [ ]Moderate [ ]Severe    Other Symptoms:  [ ]All other review of systems negative     Palliative Performance Status Version 2:         %    http://[facility].org/files/news/palliative_performance_scale_ppsv2.pdf  PHYSICAL EXAM:  Vital Signs Last 24 Hrs  T(C): 36.9 (21 Aug 2023 05:36), Max: 37.3 (20 Aug 2023 20:30)  T(F): 98.5 (21 Aug 2023 05:36), Max: 99.1 (20 Aug 2023 20:30)  HR: 69 (21 Aug 2023 05:36) (69 - 83)  BP: 97/53 (21 Aug 2023 05:36) (97/53 - 105/68)  BP(mean): 72 (21 Aug 2023 05:36) (72 - 72)  RR: 18 (21 Aug 2023 05:36) (18 - 18)  SpO2: 94% (20 Aug 2023 20:30) (94% - 94%)    Parameters below as of 21 Aug 2023 05:36  Patient On (Oxygen Delivery Method): room air     I&O's Summary    20 Aug 2023 07:01  -  21 Aug 2023 07:00  --------------------------------------------------------  IN: 1150 mL / OUT: 903 mL / NET: 247 mL        GENERAL:  [X ] No acute distress [ ]Lethargic  [ ]Unarousable  [ ]Verbal  [ ]Non-Verbal [ ]Cachexia    BEHAVIORAL/PSYCH:  [ ]Alert and Oriented x  [ ] Anxiety [ ] Delirium [ ] Agitation [X ] Calm   EYES: [ ] No scleral icterus [ ] Scleral icterus [ ] Closed  ENMT:  [ ]Dry mouth  [ ]No external oral lesions [ ] No external ear or nose lesions  CARDIOVASCULAR:  [ ]Regular [ ]Irregular [ ]Tachy [ ]Not Tachy  [ ]Stewart [ ] Edema [ ] No edema  PULMONARY:  [ ]Tachypnea  [ ]Audible excessive secretions [X ] No labored breathing [ ] labored breathing  GASTROINTESTINAL: [ ]Soft  [ ]Distended  [ X]Not distended [ ]Non tender [ ]Tender  MUSCULOSKELETAL: [ ]No clubbing [ ] clubbing  [ ] No cyanosis [ ] cyanosis  NEUROLOGIC: [ ]No focal deficits  [ ]Follows commands  [ ]Does not follow commands  [ ]Cognitive impairment  [ ]Dysphagia  [ ]Dysarthria  [ ]Paresis   SKIN: [ ] Jaundiced [X ] Non-jaundiced [ ]Rash [ ]No Rash [ ] Warm [ ] Dry  MISC/LINES: [ ] ET tube [ ] Trach [ ]NGT/OGT [ ]PEG [ ]Pineda    CRITICAL CARE:  [ ] Shock Present  [ ]Septic [ ]Cardiogenic [ ]Neurologic [ ]Hypovolemic  [ ]  Vasopressors [ ]  Inotropes   [ ]Respiratory failure present [ ]Mechanical ventilation [ ]Non-invasive ventilatory support [ ]High flow  [ ]Acute  [ ]Chronic [ ]Hypoxic  [ ]Hypercarbic [ ]Other  [ ]Other organ failure     LABS: reviewed by me            RADIOLOGY & ADDITIONAL STUDIES: reviewed by me    PROTEIN CALORIE MALNUTRITION PRESENT: [ ]mild [ ]moderate [ ]severe [ ]underweight [ ]morbid obesity  https://www.andeal.org/vault/2440/web/files/ONC/Table_Clinical%20Characteristics%20to%20Document%20Malnutrition-White%20JV%20et%20al%202012.pdf    Height (cm): 149.9 (08-15-23 @ 10:18)  Weight (kg): 59 (08-14-23 @ 13:18)  BMI (kg/m2): 26.3 (08-15-23 @ 10:18)    [ ]PPSV2 < or = to 30% [ ]significant weight loss  [ ]poor nutritional intake  [ ]anasarca      [ ]Artificial Nutrition      Palliative Care Spiritual/Emotional Screening Tool Question  Severity (0-4):                    OR                    [ ] Unable to determine/NA  Score of 2 or greater indicates recommendation of Chaplaincy referral  Chaplaincy Referral: [ ] Yes [ ] Refused [ ] Following     Caregiver [place]:  [ ] Yes [ ] No [ ] Deferred  Social Work Referral [ ]  Patient and Family Centered Care Referral [ ]    Anticipatory Grief Present: [ ] Yes [ ] No [ ] Deferred  Social Work Referral [ ]  Patient and Family Centered Care Referral [ ]    REFERRALS:   [ ]Chaplaincy  [ ]Hospice  [ ]Child Life  [ ]Social Work  [ ]Case management [ ]Holistic Therapy     Palliative care education provided to patient and/or family    Goals of Care Document:     ______________  Ferdinand Brumfield MD  Palliative Medicine  [facility]   of Geriatric and Palliative Medicine  [phone]           HPI: 72F with PMH including CVA (nonverbal, bedbound) with RUE and RLE weakness, hypothyroidism, HLD here with SOB and fever, and found to have severe sepsis from LLL PNA, started on ceftriaxone and aspiration precautions. Had enemas and disimpaction for stercoral colitis, and noted to have possible vesicovaginal distula. Patient is DNR/DNI, and family leaning towards comfort care. Palliative consulted for GOC, hospice referral made.    PERTINENT PM/SXH:   Stroke    Hypothyroidism    Hypercholesteremia    FAMILY HISTORY: cannot obtain from patient    ITEMS NOT CHECKED ARE NOT PRESENT    SOCIAL HISTORY:   Significant other/partner[ ]  Children[ ]  Hoahaoism/Spirituality:  Substance hx:  [ ]   Tobacco hx:  [ ]   Alcohol hx: [ ]   Living Situation: [X ]Home  [ ]Long term care  [ ]Rehab [ ]Other  Home Services: [ ] HHA [ ] Visting RN [ ] Hospice  Occupation:  Home Opioid hx:  [ ] Y [ ] N [ ] I-Stop Reference No:    ADVANCE DIRECTIVES:    [ ] Full Code [X ] DNR  MOLST  [ ]  Living Will  [ ]   DECISION MAKER(s):  [ ] Health Care Proxy(s)  [ ] Surrogate(s)  [ ] Guardian           Name(s): Phone Number(s): sister Tasha [phone]    BASELINE (I)ADL(s) (prior to admission):  [place]: [ ]Total  [ ] Moderate [ ]Dependent  Palliative Performance Status Version 2:         %    http://npcrc.org/files/news/palliative_performance_scale_ppsv2.pdf    Allergies    No Known Allergies    Intolerances    MEDICATIONS  (STANDING):  atorvastatin 20 milliGRAM(s) Oral at bedtime  cefTRIAXone   IVPB 2000 milliGRAM(s) IV Intermittent every 24 hours  chlorhexidine 2% Cloths 1 Application(s) Topical <User Schedule>  enoxaparin Injectable 40 milliGRAM(s) SubCutaneous every 24 hours  polyethylene glycol 3350 17 Gram(s) Oral every 12 hours  senna 2 Tablet(s) Oral at bedtime    MEDICATIONS  (PRN):  acetaminophen     Tablet .. 650 milliGRAM(s) Oral every 6 hours PRN Moderate Pain (4 - 6)  bisacodyl Suppository 10 milliGRAM(s) Rectal daily PRN Constipation      PRESENT SYMPTOMS: [X ]Unable to obtain due to poor mentation   Source if other than patient:  [ ]Family   [ ]Team     Pain: [ ]yes [ ]no  QOL impact -   Location -                    Aggravating factors -  Quality -  Radiation -  Timing-  Severity (0-10 scale):  Minimal acceptable level (0-10 scale):     CPOT:    https://www.[facility].org/getattachment/[email or web address]/Critical-Care-Pain-Observation-Tool-(CPOT)    PAIN AD Score: 0  http://geriatrictoolkit.[facility]/cog/painad.pdf (press ctrl +  left click to view)    Dyspnea:                           [ ]None[ ]Mild [ ]Moderate [ ]Severe     Respiratory Distress Observation Scale (RDOS): 0  A score of 0 to 2 signifies little or no respiratory distress, 3 signifies mild distress, scores 4 to 6 indicate moderate distress, and scores greater than 7 signify severe distress  https://www.[facility].ca/sites/default/files/PDFS/213966-wepnktqwttj-afyhjwny-wpqnjasnchm-qyefe.pdf    Anxiety:                             [ ]None[ ]Mild [ ]Moderate [ ]Severe   Fatigue:                             [ ]None[ ]Mild [ ]Moderate [ ]Severe   Nausea:                             [ ]None[ ]Mild [ ]Moderate [ ]Severe   Loss of appetite:              [ ]None[ ]Mild [ ]Moderate [ ]Severe   Constipation:                    [ ]None[ ]Mild [ ]Moderate [ ]Severe    Other Symptoms:  [ ]All other review of systems negative     Palliative Performance Status Version 2:         %    http://[facility].org/files/news/palliative_performance_scale_ppsv2.pdf  PHYSICAL EXAM:  Vital Signs Last 24 Hrs  T(C): 36.9 (21 Aug 2023 05:36), Max: 37.3 (20 Aug 2023 20:30)  T(F): 98.5 (21 Aug 2023 05:36), Max: 99.1 (20 Aug 2023 20:30)  HR: 69 (21 Aug 2023 05:36) (69 - 83)  BP: 97/53 (21 Aug 2023 05:36) (97/53 - 105/68)  BP(mean): 72 (21 Aug 2023 05:36) (72 - 72)  RR: 18 (21 Aug 2023 05:36) (18 - 18)  SpO2: 94% (20 Aug 2023 20:30) (94% - 94%)    Parameters below as of 21 Aug 2023 05:36  Patient On (Oxygen Delivery Method): room air     I&O's Summary    20 Aug 2023 07:01  -  21 Aug 2023 07:00  --------------------------------------------------------  IN: 1150 mL / OUT: 903 mL / NET: 247 mL        GENERAL:  [X ] No acute distress [ ]Lethargic  [ ]Unarousable  [ ]Verbal  [ ]Non-Verbal [ ]Cachexia    BEHAVIORAL/PSYCH:  [ ]Alert and Oriented x  [ ] Anxiety [ ] Delirium [ ] Agitation [X ] Calm   EYES: [ ] No scleral icterus [ ] Scleral icterus [X ] Closed  ENMT:  [ ]Dry mouth  [ ]No external oral lesions [X ] No external ear or nose lesions  CARDIOVASCULAR:  [ ]Regular [ ]Irregular [ ]Tachy [ ]Not Tachy  [ ]Stewart [ ] Edema [ ] No edema  PULMONARY:  [ ]Tachypnea  [ ]Audible excessive secretions [X ] No labored breathing [ ] labored breathing  GASTROINTESTINAL: [ ]Soft  [ ]Distended  [ X]Not distended [ ]Non tender [ ]Tender  MUSCULOSKELETAL: [ ]No clubbing [ ] clubbing  [ X] No cyanosis [ ] cyanosis  NEUROLOGIC: [ ]No focal deficits  [ ]Follows commands  [X ]Does not follow commands  [ ]Cognitive impairment  [ ]Dysphagia  [ ]Dysarthria  [ ]Paresis   SKIN: [ ] Jaundiced [X ] Non-jaundiced [ ]Rash [ ]No Rash [ ] Warm [ ] Dry  MISC/LINES: [ ] ET tube [ ] Trach [ ]NGT/OGT [ ]PEG [ ]Pineda    CRITICAL CARE:  [ ] Shock Present  [ ]Septic [ ]Cardiogenic [ ]Neurologic [ ]Hypovolemic  [ ]  Vasopressors [ ]  Inotropes   [ ]Respiratory failure present [ ]Mechanical ventilation [ ]Non-invasive ventilatory support [ ]High flow  [ ]Acute  [ ]Chronic [ ]Hypoxic  [ ]Hypercarbic [ ]Other  [ ]Other organ failure     LABS: no labs for review    RADIOLOGY & ADDITIONAL STUDIES: reviewed by me    PROTEIN CALORIE MALNUTRITION PRESENT: [ ]mild [ ]moderate [ ]severe [ ]underweight [ ]morbid obesity  https://www.andeal.org/vault/2440/web/files/ONC/Table_Clinical%20Characteristics%20to%20Document%20Malnutrition-White%20JV%20et%20al%202012.pdf    Height (cm): 149.9 (08-15-23 @ 10:18)  Weight (kg): 59 (08-14-23 @ 13:18)  BMI (kg/m2): 26.3 (08-15-23 @ 10:18)    [ ]PPSV2 < or = to 30% [ ]significant weight loss  [ ]poor nutritional intake  [ ]anasarca      [ ]Artificial Nutrition      Palliative Care Spiritual/Emotional Screening Tool Question  Severity (0-4):                    OR                    [X ] Unable to determine/NA  Score of 2 or greater indicates recommendation of Chaplaincy referral  Chaplaincy Referral: [ ] Yes [ ] Refused [ ] Following     Caregiver [place]a:  [ ] Yes [ ] No [X ] Deferred  Social Work Referral [ ]  Patient and Family Centered Care Referral [ ]    Anticipatory Grief Present: [ ] Yes [ ] No [X ] Deferred  Social Work Referral [ ]  Patient and Family Centered Care Referral [ ]    REFERRALS:   [ ]Chaplaincy  [ ]Hospice  [ ]Child Life  [ ]Social Work  [ ]Case management [ ]Holistic Therapy     Palliative care education provided to patient and/or family    Goals of Care Document:     ______________  Ferdinand Brumfield MD  Palliative Medicine  [facility]   of Geriatric and Palliative Medicine  [phone]

## 2023-08-21 NOTE — CONSULT NOTE ADULT - ASSESSMENT
72F with PMH including CVA (nonverbal, bedbound) with RUE and RLE weakness, hypothyroidism, HLD here with SOB and fever, and found to have severe sepsis from LLL PNA, started on ceftriaxone and aspiration precautions. Had enemas and disimpaction for stercoral colitis, and noted to have possible vesicovaginal distula. Patient is DNR/DNI, and family leaning towards comfort care. Palliative consulted for GOC, hospice referral made.      
IMPRESSION:    Acute hypoxemic resp failure  Pneumonia/ possible aspiration  Sepsis POA  HO CVA ( bedbound/ non verbal)      PLAN:    CNS: Avoid CNS depressant    HEENT:  Oral care    PULMONARY:  HOB @ 45 degrees, aspiration precautions, NC, keep SaO2 92 TO 96%    CARDIOVASCULAR: IVF, while NPO    GI: GI prophylaxis                                          Feeding Speech and swallow eval    RENAL:  F/u  lytes.  Correct as needed. accurate I/O    INFECTIOUS DISEASE: abx, procal. pancx., RVP    HEMATOLOGICAL:  DVT prophylaxis.    ENDOCRINE:  Follow up FS.  Insulin protocol if needed.    SDU    GOC    Poor prognosis  
Patient is a 72 year old Female with a PMHx of CVA (non-verbal and bedbound), hypothyroidism, hypercholesterolemia bought to the ED from home by sister due to abd pain/ fever and labored breathing. Patient septic on admission and found to have pneumonia with possible aspiration.    #Sepsis 2/2 Pneumonia  - CT Chest/Abd/Pelvis showed Bilateral patchy airspace opacities with left lower lobe consolidation consistent with pneumonia in the appropriate clinical setting. Debris or secretions within trachea, with occlusion of small left lower lobe peripheral airway; correlate for aspiration. Large rectal fecal stool burden, 11 cm AP. Contrast, presumably excreted, within vagina, suspicious for vesicovaginal fistula. Findings suggestive of mild colitis involving segment of descending colon; no abscess.  - Febrile 102; Tachycardic on admission  - Pt may have aspirated; NPO for now  - Lactate 3.6  - rapid viral panel negative  - s/p Cefepime and Flagyl in ED    Plan:  - discontinue flagyl  - Switch to Cefepime 2g q12  - f/u sputum culture  - f/u procal  - f/u Urine and Blood culture  - f/u RVP  - f/u urine legionella and strep

## 2023-08-21 NOTE — CONSULT NOTE ADULT - PROBLEM SELECTOR RECOMMENDATION 5
- will follow  ______________  Ferdinand Brumfield MD  Palliative Medicine  [facility]   of Geriatric and Palliative Medicine  [phone]

## 2023-08-21 NOTE — HOSPICE CARE NOTE - CONVESATION DETAILS
Follow up call placed to patient's sister Tasha o further discuss options for hospice services upon D/C. Tasha's goals is to bring her sister home. Tasha unable to provide care and reports HHA's will be present Pending further discussion to finalize Plan.

## 2023-08-21 NOTE — HOSPICE CARE NOTE - CONVESATION DETAILS
•	DME not from a provider [facility] contracts with. DME needs to be swapped at this time. Tasha to call current vendor to coordinate .  Hospice to follow up tomorrow.

## 2023-08-22 PROCEDURE — 99233 SBSQ HOSP IP/OBS HIGH 50: CPT

## 2023-08-22 PROCEDURE — 99231 SBSQ HOSP IP/OBS SF/LOW 25: CPT

## 2023-08-22 RX ADMIN — CHLORHEXIDINE GLUCONATE 1 APPLICATION(S): 213 SOLUTION TOPICAL at 05:26

## 2023-08-22 RX ADMIN — ENOXAPARIN SODIUM 40 MILLIGRAM(S): 100 INJECTION SUBCUTANEOUS at 22:05

## 2023-08-22 RX ADMIN — CEFTRIAXONE 100 MILLIGRAM(S): 500 INJECTION, POWDER, FOR SOLUTION INTRAMUSCULAR; INTRAVENOUS at 17:02

## 2023-08-22 RX ADMIN — SENNA PLUS 2 TABLET(S): 8.6 TABLET ORAL at 22:04

## 2023-08-22 NOTE — HOSPICE CARE NOTE - CONVESATION DETAILS
Current hospital bed is spending  Wednesday. Hospice to coordinate delivery of our DME for late Wednesday or early Thursday . Hospice dispo ongoing.

## 2023-08-22 NOTE — PROGRESS NOTE ADULT - ASSESSMENT
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 was hypoxic at 86% on RA and altered. Pt. was placed in NRB 15L and O2 sat improved to 96%. Pt's sister states patient has congestion nose for the last few days with dry cough, in the ED T max 102.2F, , BP stable, labs showed lactate 3.6, CT chest showed left lower lobe consolidation, Admitted to SDU for closer monitoring.    #Acute Hypoxic respiratory failure:   #Left lower lobe pneumonia: gram negative v aspiration  #Severe Sepsis on admission; fever, tachycardia.   #Recent URI   Ct chest showed left lower lobe consolidation. Debris or secretions within trachea, with occlusion of small left lower lobe peripheral airway; correlate for aspiration  WBC 10K, lactate 3.6--->1.4  procal  1.4  blood cx NGTD,   Speech and swallow, recommended NPO.   Aspiration precaution  Patient is on comfort feeds only  ID following :   - continue ceftriaxone 2g daily   - plan for 7 days from last fever (end date 8/22)     #Colitis - possible stercoral colitis with fecal burden   - Digital rectal disimpaction + Water enema    #Possible vesicovaginal fistula   CT abdomen showed Large rectal fecal stool burden, 11 cm AP.   Contrast, presumably excreted, within vagina, suspicious for vesicovaginal fistula. Findings suggestive of mild colitis involving segment of descending colon; no abscess.  May need surgical consultation depending on GOC     #History of CVA:   #Functional quadriplegia:   # Dysphagia:  -Patient is bedbound  -Comfort feeds  -HCP is speaking with hospice, plan is for home hospice Thursday. Will speak about CMO    CODE Status DNR/DNI,

## 2023-08-22 NOTE — PROGRESS NOTE ADULT - ASSESSMENT
72 year old woman with a PMH of CVA (non-verbal and bedbound) with residual upper and LE weakness, bed bound, hypothyroidism, hypercholesterolemia bought to the ED from home by sister for SOB and fever, When EMS arrived on scene, pt. was hypoxic at 86% on RA and altered. Pt. was placed in NRB 15L and O2 sat improved to 96%. Pt's sister states patient has nasal congestion for the last few days with dry cough. In the ED, T max 102.2F, , BP stable, labs showed lactate 3.6, CT Chest showed left lower lobe consolidation. Initially, admitted to SDU for closer monitoring.    # family has opted for hospice  hospice noted  pall care noted  pt is basically CMO (not officially) - I see no reason for labs or studies at this point  below should be read in this context of hospice    # Acute Hypoxic respiratory failure (resolved) 2/2 Left lower lobe pneumonia: suspect gram negative bacteria causing severe sepsis present on admission (fever, tachycardia); Recent URI   Ct chest showed left lower lobe consolidation.   lactate 3.6 ->1.4  procal 1.4  blood cx NGTD  ID noted  abx: rocephin 2gm iv q24 til 8/22 - stop today  on RA    # Speech and swallow: recommended NPO.   Aspiration precaution.   pt never wanted PEG, NGT d/c'd as well  family wanted to try pleasure feeds, but pt does NOT seem awake enough as of now    # Colitis - possible stercoral colitis with fecal burden   previously had digital rectal disimpaction + water enema    # Possible vesicovaginal fistula   CT abdomen showed Large rectal fecal stool burden, 11 cm AP.   Contrast, presumably excreted, within vagina, suspicious for vesicovaginal fistula. Findings suggestive of mild colitis involving segment of descending colon; no abscess.  not going to intervene surgically given hospice status    # History of CVA; Functional quadriplegia; Dysphagia; non-verbal  Patient is bedbound  off loading as possible    # CODE Status DNR/DNI    Dispo: hospice/pall care f/u; avoid labs/studies;   eventually, pt will go home on hospice - f/u CM - stable for d/c

## 2023-08-22 NOTE — PROGRESS NOTE ADULT - ASSESSMENT
72F with PMH including CVA (nonverbal, bedbound) with RUE and RLE weakness, hypothyroidism, HLD here with SOB and fever, and found to have severe sepsis from LLL PNA, started on ceftriaxone and aspiration precautions. Had enemas and disimpaction for stercoral colitis, and noted to have possible vesicovaginal distula. Patient is DNR/DNI, and family leaning towards comfort care. Palliative consulted for GOC, hospice referral made.

## 2023-08-22 NOTE — PROGRESS NOTE ADULT - SUBJECTIVE AND OBJECTIVE BOX
24-Hour Events:    Patient is a 72y old Female who presents with a chief complaint of Pneumonia (18 Aug 2023 20:35)    Primary diagnosis of Sepsis    Day 7: Patient removed NG tube but was replaced by night team.    Today is hospital Day 8. Patient is no longer on NG tube and is receiving comfort feeds as discussed with HCP. Will speak again about CMO today.    CODE STATUS:    FAMILY COMMUNICATION:  Contact date:  Name of person contacted:  Relationship to patient:  Communication details:  What matters most:    PAST MEDICAL & SURGICAL HISTORY  Stroke    Hypothyroidism    Hypercholesteremia      SOCIAL HISTORY:  Social History:      ALLERGIES:  No Known Allergies    MEDICATIONS:  STANDING MEDICATIONS  atorvastatin 20 milliGRAM(s) Oral at bedtime  cefTRIAXone   IVPB 2000 milliGRAM(s) IV Intermittent every 24 hours  chlorhexidine 2% Cloths 1 Application(s) Topical <User Schedule>  enoxaparin Injectable 40 milliGRAM(s) SubCutaneous every 24 hours  polyethylene glycol 3350 17 Gram(s) Oral every 12 hours  senna 2 Tablet(s) Oral at bedtime    PRN MEDICATIONS  acetaminophen     Tablet .. 650 milliGRAM(s) Oral every 6 hours PRN  bisacodyl Suppository 10 milliGRAM(s) Rectal daily PRN    VITALS:   T(F): 98.5  HR: 69  BP: 97/53  RR: 18  SpO2: 94%    PHYSICAL EXAM:  GENERAL:   ( X ) NAD, lying in bed comfortably     (  ) Obtunded     (  ) Lethargic     (  ) Somnolent    HEAD:   ( X ) Atraumatic     (  ) Hematoma     (  ) laceration (Specify Location:       )     NECK:  (  ) Supple     (  ) Neck stiffness     (  ) Nuchal Rigidity     (  )  no JVD     (  ) JVD present ( -- cm)    HEART:  Rate -->     ( X ) Normal rate     (  ) Bradycardic     (  ) Tachycardic  Rhythm -->     ( X )Regular     (  ) Regularly Irregular     (  ) Irregularly Irregular  Murmurs -->     ( X ) Normal S1 S2     (  ) Systolic Murmur     (  ) Diastolic Murmur     (  ) Continuous Murmur      (  ) S3 present     (  ) S4 present    LUNGS:   ( X ) Unlabored respirations     (  ) tachypnea  ( X ) B/L air entry     (  ) decreased breath sounds in:  (location     )    (  ) no adventitious sound     (  ) crackles     (  ) wheezing      (  ) rhonchi      (specify location:        )  (  ) chest wall tenderness (specify location:       )    ABDOMEN:   ( X ) Soft     (  ) tense   |   ( X ) nondistended     (  ) distended   |   (  ) +BS     (  ) hypoactive bowel sounds     (  ) hyperactive bowel sounds  ( X ) nontender     (  ) RUQ tenderness     (  ) RLQ tenderness     (  ) LLQ tenderness     (  ) epigastric tenderness     (  ) diffuse tenderness  (  ) Splenomegaly      (  ) Hepatomegaly      (  ) Jaundice     (  ) ecchymosis     EXTREMITIES:  (  ) Normal     (  ) Rash     (  ) ecchymosis     (  ) varicose veins      (  ) pitting edema     (  ) non-pitting edema   (  ) Ulceration     (  ) gangrene:     (location:     )    NERVOUS SYSTEM:    (  ) A&Ox3     (  ) confused     (  ) lethargic Patient is nonverbal and bedbound at baseline  CN II-XII:     (  ) Intact     (  ) deficits found     (Specify:     )   Upper extremities:     (  ) no sensorimotor deficits     (  ) weakness     (  ) loss of proprioception/vibration     (  ) loss of touch/temperature (specify:    )  Lower extremities:     (  ) no sensorimotor deficits     (  ) weakness     (  ) loss of proprioception/vibration     (  ) loss of touch/temperature (specify:    )    SKIN:   (  ) No rashes or lesions     (  ) maculopapular rash     (  ) pustules     (  ) vesicles     (  ) ulcer     (  ) ecchymosis     (specify location:     )    AMPAC score:    (  ) Indwelling Pineda Catheter:  Date inserted:    Reason (  ) Critical Illness     (  ) urinary retention    (  ) Accurate Ins/Outs Monitoring     (  ) CMO patient    (  ) Central Line:   Date inserted:  Location: (  ) Right IJ     (  ) Left IJ     (  ) Right Fem     (  ) Left Fem    (  ) SPC        (  ) Pigtail       (  ) PEG tube       (  ) Colostomy       (  ) Jejunostomy  (  ) U-Dall    LABS:                        RADIOLOGY:

## 2023-08-22 NOTE — PROGRESS NOTE ADULT - SUBJECTIVE AND OBJECTIVE BOX
ANNA COBOS  72y  Female  ***My note supersedes ALL resident notes that I sign.  My corrections for their notes are in my note.***    I can be reached directly on [employer]. My office number is [phone]. My personal cell number is [phone].    INTERVAL EVENTS: Here for f/u of CVA. Pt the same - non-verbal. No events. Not eating.    T(F): 98.3 (08-22-23 @ 05:26), Max: 98.7 (08-21-23 @ 13:27)  HR: 63 (08-22-23 @ 08:25) (63 - 80)  BP: 121/56 (08-22-23 @ 05:26) (99/57 - 121/56)  RR: 18 (08-22-23 @ 05:26) (17 - 18)  SpO2: 93% (08-22-23 @ 08:25) (93% - 95%)    Gen: NAD  HEENT: PERRL, nose clr; no NGT  Neck: no nodes, no JVD, thyroid nl  lungs: clr  hrt: s1 s2 rrr no murmur  abd: soft, NT/ND, no HS megaly; no PEG  ext: no edema, no c/c  neuro: somewhat arousable to leg tap; non-verbal; seems functionally quadriplegic  : primafit    LABS:    RADIOLOGY & ADDITIONAL TESTS:      MEDICATIONS:  cefTRIAXone   IVPB 2000 milliGRAM(s) IV Intermittent every 24 hours    acetaminophen     Tablet .. 650 milliGRAM(s) Oral every 6 hours PRN  bisacodyl Suppository 10 milliGRAM(s) Rectal daily PRN  chlorhexidine 2% Cloths 1 Application(s) Topical <User Schedule>  enoxaparin Injectable 40 milliGRAM(s) SubCutaneous every 24 hours  polyethylene glycol 3350 17 Gram(s) Oral every 12 hours  senna 2 Tablet(s) Oral at bedtime

## 2023-08-22 NOTE — PROGRESS NOTE ADULT - SUBJECTIVE AND OBJECTIVE BOX
HPI:  Ms Laird is a 72 year old Female with a PMHx of CVA (non-verbal and bedbound), hypothyroidism, hypercholesterolemia bought to the ED from home by sister due to abd pain/ fever and labored breathing. When EMS arrived on scene, pt. was hypoxic at 86% on RA and altered. Pt. was placed in NRB 15L and O2 sat improved to 96%. Pt's sister states pt was exposed to a sick caregiver and starting 3 days ago pt has been noted to have nasal congestion and non productive cough. Of note sister states pt was RUMC due to diarrhea/ constipation and discharged yesterday.     Vital Signs Last 12 Hrs  T(F): 100.3 (08-14-23 @ 01:45), Max: 102.2 (08-14-23 @ 00:15)  HR: 100 (08-14-23 @ 01:45) (100 - 140)  BP: 134/66 (08-14-23 @ 01:45) (104/62 - 134/66)  RR: 20 (08-14-23 @ 01:45) (20 - 20)  SpO2: 97% (08-14-23 @ 01:45) (96% - 98%)    Labs in the ED are significant for Lactate of 3.6 -> 2.9; VBG pCO2 52 and HCO3 30.     CT Chest/Abd/Pelvis showed Bilateral patchy airspace opacities with left lower lobe consolidation consistent with pneumonia in the appropriate clinical setting. Debris or secretions within trachea, with occlusion of small left lower lobe peripheral airway; correlate for aspiration. No evidence of acute pulmonary embolism. Large rectal fecal stool burden, 11 cm AP. Contrast, presumably excreted, within vagina, suspicious for vesicovaginal fistula. Findings suggestive of mild colitis involving segment of descending colon; no abscess. (14 Aug 2023 04:04)      Interval history:     8/22/23: pt alert, non verbal, appears comfortable. No family at bedside.      ADVANCE DIRECTIVES:     [ ] Full Code [ x] DNR/DNI  MOLST  [ ]  Living Will  [ ]   DECISION MAKER(s):  [ ] Health Care Proxy(s)  [x ] Surrogate(s)  [ ] Guardian           Name(s): Phone Number(s):      Answers: Emergency Contact Name Tasha Fields,  Answers: Emergency Contact Phone # [phone],  Answers: Emergency Contact Relationship Sister/HCP    BASELINE (I)ADL(s) (prior to admission):    [place]: [ ]Total  [ ] Moderate [x ]Dependent    Allergies    No Known Allergies    Intolerances    MEDICATIONS  (STANDING):  cefTRIAXone   IVPB 2000 milliGRAM(s) IV Intermittent every 24 hours  chlorhexidine 2% Cloths 1 Application(s) Topical <User Schedule>  enoxaparin Injectable 40 milliGRAM(s) SubCutaneous every 24 hours  polyethylene glycol 3350 17 Gram(s) Oral every 12 hours  senna 2 Tablet(s) Oral at bedtime    MEDICATIONS  (PRN):  acetaminophen     Tablet .. 650 milliGRAM(s) Oral every 6 hours PRN Moderate Pain (4 - 6)  bisacodyl Suppository 10 milliGRAM(s) Rectal daily PRN Constipation    PRESENT SYMPTOMS: [ x]Unable to obtain due to poor mentation   Source if other than patient:  [ ]Family   [ ]Team         CPOT:    https://www.[facility].org/getattachment/[email or web address]/Critical-Care-Pain-Observation-Tool-(CPOT)    PAIN AD Score: 2  http://geriatrictoolkit.[facility]/cog/painad.pdf (press ctrl +  left click to view)      Respiratory Distress Observation Scale (RDOS): 0  A score of 0 to 2 signifies little or no respiratory distress, 3 signifies mild distress, scores 4 to 6 indicate moderate distress, and scores greater than 7 signify severe distress  [email or web address]://[email or web address]/sites/default/files/[email or web address]      PHYSICAL EXAM:  Vital Signs Last 24 Hrs  T(C): 36.6 (22 Aug 2023 13:01), Max: 36.8 (21 Aug 2023 21:37)  T(F): 97.9 (22 Aug 2023 13:01), Max: 98.3 (21 Aug 2023 21:37)  HR: 88 (22 Aug 2023 13:01) (63 - 88)  BP: 110/55 (22 Aug 2023 13:01) (110/55 - 121/56)  BP(mean): --  RR: 18 (22 Aug 2023 13:01) (18 - 18)  SpO2: 93% (22 Aug 2023 08:25) (93% - 93%)    Parameters below as of 22 Aug 2023 08:25  Patient On (Oxygen Delivery Method): room air     I&O's Summary  GENERAL:  [X ] No acute distress [ ]Lethargic  [ ]Unarousable  [ ]Verbal  [ ]Non-Verbal [ ]Cachexia    BEHAVIORAL/PSYCH:  [ ]Alert and Oriented x  [ ] Anxiety [ ] Delirium [ ] Agitation [X ] Calm   EYES: [ ] No scleral icterus [ ] Scleral icterus [X ] Closed  ENMT:  [ ]Dry mouth  [ ]No external oral lesions [X ] No external ear or nose lesions  CARDIOVASCULAR:  [ ]Regular [ ]Irregular [ ]Tachy [ ]Not Tachy  [ ]Stewart [ ] Edema [ ] No edema  PULMONARY:  [ ]Tachypnea  [ ]Audible excessive secretions [X ] No labored breathing [ ] labored breathing  GASTROINTESTINAL: [ ]Soft  [ ]Distended  [ X]Not distended [ ]Non tender [ ]Tender  MUSCULOSKELETAL: [ ]No clubbing [ ] clubbing  [ X] No cyanosis [ ] cyanosis  NEUROLOGIC: [ ]No focal deficits  [ ]Follows commands  [X ]Does not follow commands  [ ]Cognitive impairment  [ ]Dysphagia  [ ]Dysarthria  [ ]Paresis   SKIN: [ ] Jaundiced [X ] Non-jaundiced [ ]Rash [ ]No Rash [ ] Warm [ ] Dry  MISC/LINES: [ ] ET tube [ ] Trach [ ]NGT/OGT [ ]PEG [ ]Pineda    LABS: reviewed by me            RADIOLOGY & ADDITIONAL STUDIES: reviewed by me    EKG: reviewed by me      PROTEIN CALORIE MALNUTRITION PRESENT: [ ]mild [ ]moderate [ ]severe [ ]underweight [ ]morbid obesity  https://www.andeal.org/vault/2440/web/files/ONC/Table_Clinical%20Characteristics%20to%20Document%20Malnutrition-White%20JV%20et%20al%202012.pdf    Height (cm): 149.9 (08-15-23 @ 10:18)  Weight (kg): 59 (08-14-23 @ 13:18)  BMI (kg/m2): 26.3 (08-15-23 @ 10:18)  [ ]PPSV2 < or = to 30% [ ]significant weight loss  [ ]poor nutritional intake  [ ]anasarca      [ ]Artificial Nutrition      Palliative Care Spiritual/Emotional Screening Tool Question  Severity (0-4):  Score of 2 or greater indicates recommendation of Chaplaincy referral  Chaplaincy Referral: [ ] Yes [ x] No [ ] Following    Caregiver [place]:  [ ] Yes [ x] No [ ] Deferred  Social Work Referral [ ]  Patient and Family Centered Care Referral [ ]    Anticipatory Grief Present: [ ] Yes [x ] No [ ] Deferred  Social Work Referral [ ]  Patient and Family Centered Care Referral [ ]    Patient discussed with primary medical team MD  Palliative care education provided to patient and/or family

## 2023-08-23 PROCEDURE — 99232 SBSQ HOSP IP/OBS MODERATE 35: CPT

## 2023-08-23 PROCEDURE — 99231 SBSQ HOSP IP/OBS SF/LOW 25: CPT

## 2023-08-23 RX ADMIN — ENOXAPARIN SODIUM 40 MILLIGRAM(S): 100 INJECTION SUBCUTANEOUS at 21:47

## 2023-08-23 RX ADMIN — Medication 650 MILLIGRAM(S): at 06:13

## 2023-08-23 RX ADMIN — CHLORHEXIDINE GLUCONATE 1 APPLICATION(S): 213 SOLUTION TOPICAL at 05:37

## 2023-08-23 RX ADMIN — Medication 650 MILLIGRAM(S): at 05:56

## 2023-08-23 RX ADMIN — SENNA PLUS 2 TABLET(S): 8.6 TABLET ORAL at 21:47

## 2023-08-23 NOTE — PROGRESS NOTE ADULT - SUBJECTIVE AND OBJECTIVE BOX
HPI:  Ms Laird is a 72 year old Female with a PMHx of CVA (non-verbal and bedbound), hypothyroidism, hypercholesterolemia bought to the ED from home by sister due to abd pain/ fever and labored breathing. When EMS arrived on scene, pt. was hypoxic at 86% on RA and altered. Pt. was placed in NRB 15L and O2 sat improved to 96%. Pt's sister states pt was exposed to a sick caregiver and starting 3 days ago pt has been noted to have nasal congestion and non productive cough. Of note sister states pt was RUMC due to diarrhea/ constipation and discharged yesterday.     Vital Signs Last 12 Hrs  T(F): 100.3 (08-14-23 @ 01:45), Max: 102.2 (08-14-23 @ 00:15)  HR: 100 (08-14-23 @ 01:45) (100 - 140)  BP: 134/66 (08-14-23 @ 01:45) (104/62 - 134/66)  RR: 20 (08-14-23 @ 01:45) (20 - 20)  SpO2: 97% (08-14-23 @ 01:45) (96% - 98%)    Labs in the ED are significant for Lactate of 3.6 -> 2.9; VBG pCO2 52 and HCO3 30.     CT Chest/Abd/Pelvis showed Bilateral patchy airspace opacities with left lower lobe consolidation consistent with pneumonia in the appropriate clinical setting. Debris or secretions within trachea, with occlusion of small left lower lobe peripheral airway; correlate for aspiration. No evidence of acute pulmonary embolism. Large rectal fecal stool burden, 11 cm AP. Contrast, presumably excreted, within vagina, suspicious for vesicovaginal fistula. Findings suggestive of mild colitis involving segment of descending colon; no abscess. (14 Aug 2023 04:04)      Interval history:     8/22/23: pt alert, non verbal, appears comfortable. No family at bedside.   8/23/23: Pt with eyes open non verbal. Appears comfortable no family at bedside    ADVANCE DIRECTIVES:     [ ] Full Code [ x] DNR/DNI  MOLST  [ ]  Living Will  [ ]   DECISION MAKER(s):  [ ] Health Care Proxy(s)  [x ] Surrogate(s)  [ ] Guardian           Name(s): Phone Number(s):      Answers: Emergency Contact Name Tasha Fields,  Answers: Emergency Contact Phone # [phone],  Answers: Emergency Contact Relationship Sister/HCP    BASELINE (I)ADL(s) (prior to admission):    [place]: [ ]Total  [ ] Moderate [x ]Dependent    Allergies    No Known Allergies    Intolerances    MEDICATIONS  (STANDING):  chlorhexidine 2% Cloths 1 Application(s) Topical <User Schedule>  enoxaparin Injectable 40 milliGRAM(s) SubCutaneous every 24 hours  polyethylene glycol 3350 17 Gram(s) Oral every 12 hours  senna 2 Tablet(s) Oral at bedtime    MEDICATIONS  (PRN):  acetaminophen     Tablet .. 650 milliGRAM(s) Oral every 6 hours PRN Moderate Pain (4 - 6)  bisacodyl Suppository 10 milliGRAM(s) Rectal daily PRN Constipation    PRESENT SYMPTOMS: [ ]Unable to obtain due to poor mentation   Source if other than patient:  [ ]Family   [ ]Team     Pain: [ ]yes [ ]no  QOL impact -   Location -                    Aggravating factors -  Quality -  Radiation -  Timing-  Severity (0-10 scale):  Minimal acceptable level (0-10 scale):     CPOT:    https://www.[facility].org/getattachment/[email or web address]-[email or web address]/Critical-Care-Pain-Observation-Tool-(CPOT)    PAIN AD Score:   http://geriatrictoolkit.[facility]/cog/painad.pdf (press ctrl +  left click to view)    Dyspnea:                           [ ]None[ ]Mild [ ]Moderate [ ]Severe     Respiratory Distress Observation Scale (RDOS):   A score of 0 to 2 signifies little or no respiratory distress, 3 signifies mild distress, scores 4 to 6 indicate moderate distress, and scores greater than 7 signify severe distress  [email or web address]://www.[facility].ca/sites/default/files/[email or web address]-zocvytbnykg-[email or web address].pdf    Anxiety:                             [ ]None[ ]Mild [ ]Moderate [ ]Severe   Fatigue:                             [ ]None[ ]Mild [ ]Moderate [ ]Severe   Nausea:                             [ ]None[ ]Mild [ ]Moderate [ ]Severe   Loss of appetite:              [ ]None[ ]Mild [ ]Moderate [ ]Severe   Constipation:                    [ ]None[ ]Mild [ ]Moderate [ ]Severe    Other Symptoms:  [ ]All other review of systems negative     Palliative Performance Status Version 2:         %    http://npcrc.org/files/news/palliative_performance_scale_ppsv2.pdf    PHYSICAL EXAM:  Vital Signs Last 24 Hrs  T(C): 36.9 (23 Aug 2023 13:06), Max: 37 (22 Aug 2023 20:13)  T(F): 98.5 (23 Aug 2023 13:06), Max: 98.6 (22 Aug 2023 20:13)  HR: 96 (23 Aug 2023 13:06) (72 - 96)  BP: 153/95 (23 Aug 2023 13:06) (109/59 - 153/95)  BP(mean): --  RR: 18 (23 Aug 2023 13:06) (18 - 18)  SpO2: 94% (23 Aug 2023 07:59) (94% - 94%)    Parameters below as of 23 Aug 2023 07:59  Patient On (Oxygen Delivery Method): room air     I&O's Summary    GENERAL:  [X ] No acute distress [ ]Lethargic  [ ]Unarousable  [ ]Verbal  [ ]Non-Verbal [ ]Cachexia    BEHAVIORAL/PSYCH:  [ ]Alert and Oriented x  [ ] Anxiety [ ] Delirium [ ] Agitation [X ] Calm   EYES: [ ] No scleral icterus [ ] Scleral icterus [X ] Closed  ENMT:  [ ]Dry mouth  [ ]No external oral lesions [X ] No external ear or nose lesions  CARDIOVASCULAR:  [ ]Regular [ ]Irregular [ ]Tachy [ ]Not Tachy  [ ]Stewart [ ] Edema [ ] No edema  PULMONARY:  [ ]Tachypnea  [ ]Audible excessive secretions [X ] No labored breathing [ ] labored breathing  GASTROINTESTINAL: [ ]Soft  [ ]Distended  [ X]Not distended [ ]Non tender [ ]Tender  MUSCULOSKELETAL: [ ]No clubbing [ ] clubbing  [ X] No cyanosis [ ] cyanosis  NEUROLOGIC: [ ]No focal deficits  [ ]Follows commands  [X ]Does not follow commands  [ ]Cognitive impairment  [ ]Dysphagia  [ ]Dysarthria  [ ]Paresis   SKIN: [ ] Jaundiced [X ] Non-jaundiced [ ]Rash [ ]No Rash [ ] Warm [ ] Dry  MISC/LINES: [ ] ET tube [ ] Trach [ ]NGT/OGT [ ]PEG [ ]Pineda    LABS: reviewed by me            RADIOLOGY & ADDITIONAL STUDIES: reviewed by me      EKG: reviewed by me      PROTEIN CALORIE MALNUTRITION PRESENT: [ ]mild [ ]moderate [ ]severe [ ]underweight [ ]morbid obesity  https://www.andeal.org/vault/2440/web/files/ONC/Table_Clinical%20Characteristics%20to%20Document%20Malnutrition-White%20JV%20et%20al%202012.pdf    Height (cm): 149.9 (08-15-23 @ 10:18)  Weight (kg): 59 (08-14-23 @ 13:18)  BMI (kg/m2): 26.3 (08-15-23 @ 10:18)  [ ]PPSV2 < or = to 30% [ ]significant weight loss  [ ]poor nutritional intake  [ ]anasarca      [ ]Artificial Nutrition      Palliative Care Spiritual/Emotional Screening Tool Question  Severity (0-4):  Score of 2 or greater indicates recommendation of Chaplaincy referral  Chaplaincy Referral: [ ] Yes [ x] No [ ] Following    Caregiver [place]:  [ ] Yes [ x] No [ ] Deferred  Social Work Referral [ ]  Patient and Family Centered Care Referral [ ]    Anticipatory Grief Present: [ ] Yes [x ] No [ ] Deferred  Social Work Referral [ ]  Patient and Family Centered Care Referral [ ]    Patient discussed with primary medical team MD  Palliative care education provided to patient and/or family

## 2023-08-23 NOTE — PROGRESS NOTE ADULT - SUBJECTIVE AND OBJECTIVE BOX
ANNA COBOS  72y  Female  ***My note supersedes ALL resident notes that I sign.  My corrections for their notes are in my note.***    I can be reached directly on [employer]. My office number is [phone]. My personal cell number is [phone].    INTERVAL EVENTS: Here for f/u of CVA. Pt was awake today, but not very alert. Not interactive. RN says pt can eat, but needs to be fed and process is very slow. Pt was making some noise while I came into the room, but she stopped after I fixed her blanket.    T(F): 98.5 (08-23-23 @ 13:06), Max: 98.6 (08-22-23 @ 20:13)  HR: 96 (08-23-23 @ 13:06) (72 - 96)  BP: 153/95 (08-23-23 @ 13:06) (109/59 - 153/95)  RR: 18 (08-23-23 @ 13:06) (18 - 18)  SpO2: 94% (08-23-23 @ 07:59) (94% - 94%)    Gen: NAD  HEENT: PERRL, nose clr;  Neck: no nodes, no JVD, thyroid nl  lungs: clr  hrt: s1 s2 rrr no murmur  abd: soft, NT/ND, no HS megaly; no PEG  ext: no edema, no c/c  neuro: awake; not very alert to examiner; non-verbal; functionally quadriplegic  : primafit    LABS:    RADIOLOGY & ADDITIONAL TESTS:    MEDICATIONS:    acetaminophen     Tablet .. 650 milliGRAM(s) Oral every 6 hours PRN  bisacodyl Suppository 10 milliGRAM(s) Rectal daily PRN  chlorhexidine 2% Cloths 1 Application(s) Topical <User Schedule>  enoxaparin Injectable 40 milliGRAM(s) SubCutaneous every 24 hours  polyethylene glycol 3350 17 Gram(s) Oral every 12 hours  senna 2 Tablet(s) Oral at bedtime

## 2023-08-23 NOTE — PROGRESS NOTE ADULT - ASSESSMENT
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 was hypoxic at 86% on RA and altered. Pt. was placed in NRB 15L and O2 sat improved to 96%. Pt's sister states patient has congestion nose for the last few days with dry cough, in the ED T max 102.2F, , BP stable, labs showed lactate 3.6, CT chest showed left lower lobe consolidation, Admitted to SDU for closer monitoring.    #Acute Hypoxic respiratory failure:   #Left lower lobe pneumonia: gram negative v aspiration  #Severe Sepsis on admission; fever, tachycardia.   #Recent URI   Ct chest showed left lower lobe consolidation. Debris or secretions within trachea, with occlusion of small left lower lobe peripheral airway; correlate for aspiration  WBC 10K, lactate 3.6--->1.4  procal  1.4  blood cx NGTD,   Speech and swallow, recommended NPO.   Aspiration precaution  Patient is on comfort feeds only  ID following :   - continue ceftriaxone 2g daily   - plan for 7 days from last fever (end date 8/22)     #Colitis - possible stercoral colitis with fecal burden   - Digital rectal disimpaction + Water enema    #Possible vesicovaginal fistula   CT abdomen showed Large rectal fecal stool burden, 11 cm AP.   Contrast, presumably excreted, within vagina, suspicious for vesicovaginal fistula. Findings suggestive of mild colitis involving segment of descending colon; no abscess.  May need surgical consultation depending on GOC     #History of CVA:   #Functional quadriplegia:   # Dysphagia:  -Patient is bedbound  -Comfort feeds  -HCP is speaking with hospice, plan is for home hospice Thursday/Friday. Spoke about CMO today, HCP sister wished to speak with more family members before making final decision. On board for hospice services.    CODE Status DNR/DNI,

## 2023-08-23 NOTE — PROGRESS NOTE ADULT - ASSESSMENT
72 year old woman with a PMH of CVA (non-verbal and bedbound) with residual upper and LE weakness, bed bound, hypothyroidism, hypercholesterolemia bought to the ED from home by sister for SOB and fever, When EMS arrived on scene, pt. was hypoxic at 86% on RA and altered. Pt. was placed in NRB 15L and O2 sat improved to 96%. Pt's sister states patient has nasal congestion for the last few days with dry cough. In the ED, T max 102.2F, , BP stable, labs showed lactate 3.6, CT Chest showed left lower lobe consolidation. Initially, admitted to SDU for closer monitoring.    # family has opted for hospice  hospice noted  pall care noted  pt is basically CMO (not officially) - I see no reason for labs or studies at this point  below should be read in this context of hospice    # Acute hypoxic respiratory failure (resolved) 2/2 Left lower lobe pneumonia: suspect gram negative bacteria causing severe sepsis present on admission (fever, tachycardia); Recent URI   Ct chest showed left lower lobe consolidation.   lactate 3.6 ->1.4  procal 1.4  blood cx NGTD  ID noted  abx: completed rocephin on 8/22  on RA    # Speech and swallow: recommended NPO.   Aspiration precaution.   pt never wanted PEG, NGT d/c'd as well  family wanted to try pleasure feeds, which pt can do, but takes a long time to feed    # Colitis - possible stercoral colitis with fecal burden   previously had digital rectal disimpaction + water enema    # Possible vesicovaginal fistula   CT abdomen showed Large rectal fecal stool burden, 11 cm AP.   Contrast, presumably excreted, within vagina, suspicious for vesicovaginal fistula. Findings suggestive of mild colitis involving segment of descending colon; no abscess.  not going to intervene surgically given hospice status    # History of CVA; Functional quadriplegia; Dysphagia; non-verbal  Patient is bedbound  off loading as possible    # CODE Status DNR/DNI    Dispo: hospice/pall care f/u; avoid labs/studies;   eventually, pt will go home on hospice - f/u CM - stable for d/c (poss home frankie - waiting on DME at home w/ hospice)

## 2023-08-23 NOTE — PROGRESS NOTE ADULT - SUBJECTIVE AND OBJECTIVE BOX
24-Hour Events:    Patient is a 72y old Female who presents with a chief complaint of Pneumonia (18 Aug 2023 20:35)    Primary diagnosis of Sepsis    Day 7: Patient removed NG tube but was replaced by night team.  Day 8. Patient is no longer on NG tube and is receiving comfort feeds as discussed with HCP. Will speak again about CMO today.    Day 9 today: Patient status unchanged. HCP still wishes to speak w/ family about CMO, on board with hospice  CODE STATUS:    FAMILY COMMUNICATION:  Contact date:  Name of person contacted:  Relationship to patient:  Communication details:  What matters most:    PAST MEDICAL & SURGICAL HISTORY  Stroke    Hypothyroidism    Hypercholesteremia      SOCIAL HISTORY:  Social History:      ALLERGIES:  No Known Allergies    MEDICATIONS:  STANDING MEDICATIONS  atorvastatin 20 milliGRAM(s) Oral at bedtime  cefTRIAXone   IVPB 2000 milliGRAM(s) IV Intermittent every 24 hours  chlorhexidine 2% Cloths 1 Application(s) Topical <User Schedule>  enoxaparin Injectable 40 milliGRAM(s) SubCutaneous every 24 hours  polyethylene glycol 3350 17 Gram(s) Oral every 12 hours  senna 2 Tablet(s) Oral at bedtime    PRN MEDICATIONS  acetaminophen     Tablet .. 650 milliGRAM(s) Oral every 6 hours PRN  bisacodyl Suppository 10 milliGRAM(s) Rectal daily PRN    VITALS:   T(F): 98.5  HR: 69  BP: 97/53  RR: 18  SpO2: 94%    PHYSICAL EXAM:  GENERAL:   ( X ) NAD, lying in bed comfortably     (  ) Obtunded     (  ) Lethargic     (  ) Somnolent    HEAD:   ( X ) Atraumatic     (  ) Hematoma     (  ) laceration (Specify Location:       )     NECK:  (  ) Supple     (  ) Neck stiffness     (  ) Nuchal Rigidity     (  )  no JVD     (  ) JVD present ( -- cm)    HEART:  Rate -->     ( X ) Normal rate     (  ) Bradycardic     (  ) Tachycardic  Rhythm -->     ( X )Regular     (  ) Regularly Irregular     (  ) Irregularly Irregular  Murmurs -->     ( X ) Normal S1 S2     (  ) Systolic Murmur     (  ) Diastolic Murmur     (  ) Continuous Murmur      (  ) S3 present     (  ) S4 present    LUNGS:   ( X ) Unlabored respirations     (  ) tachypnea  ( X ) B/L air entry     (  ) decreased breath sounds in:  (location     )    (  ) no adventitious sound     (  ) crackles     (  ) wheezing      (  ) rhonchi      (specify location:        )  (  ) chest wall tenderness (specify location:       )    ABDOMEN:   ( X ) Soft     (  ) tense   |   ( X ) nondistended     (  ) distended   |   (  ) +BS     (  ) hypoactive bowel sounds     (  ) hyperactive bowel sounds  ( X ) nontender     (  ) RUQ tenderness     (  ) RLQ tenderness     (  ) LLQ tenderness     (  ) epigastric tenderness     (  ) diffuse tenderness  (  ) Splenomegaly      (  ) Hepatomegaly      (  ) Jaundice     (  ) ecchymosis     EXTREMITIES:  (  ) Normal     (  ) Rash     (  ) ecchymosis     (  ) varicose veins      (  ) pitting edema     (  ) non-pitting edema   (  ) Ulceration     (  ) gangrene:     (location:     )    NERVOUS SYSTEM:    (  ) A&Ox3     (  ) confused     (  ) lethargic Patient is nonverbal and bedbound at baseline  CN II-XII:     (  ) Intact     (  ) deficits found     (Specify:     )   Upper extremities:     (  ) no sensorimotor deficits     (  ) weakness     (  ) loss of proprioception/vibration     (  ) loss of touch/temperature (specify:    )  Lower extremities:     (  ) no sensorimotor deficits     (  ) weakness     (  ) loss of proprioception/vibration     (  ) loss of touch/temperature (specify:    )    SKIN:   (  ) No rashes or lesions     (  ) maculopapular rash     (  ) pustules     (  ) vesicles     (  ) ulcer     (  ) ecchymosis     (specify location:     )    AMPAC score:    (  ) Indwelling Pineda Catheter:  Date inserted:    Reason (  ) Critical Illness     (  ) urinary retention    (  ) Accurate Ins/Outs Monitoring     (  ) CMO patient    (  ) Central Line:   Date inserted:  Location: (  ) Right IJ     (  ) Left IJ     (  ) Right Fem     (  ) Left Fem    (  ) SPC        (  ) Pigtail       (  ) PEG tube       (  ) Colostomy       (  ) Jejunostomy  (  ) U-Dall    LABS:                        RADIOLOGY:

## 2023-08-23 NOTE — PROGRESS NOTE ADULT - ASSESSMENT
72F with PMH including CVA (nonverbal, bedbound) with RUE and RLE weakness, hypothyroidism, HLD here with SOB and fever, and found to have severe sepsis from LLL PNA, started on ceftriaxone and aspiration precautions. Had enemas and disimpaction for stercoral colitis, and noted to have possible vesicovaginal distula. Patient is DNR/DNI, and family leaning towards comfort care. Palliative consulted for GOC, hospice referral made.    Patient appears comfortable, no meds ordered for symptom management as pt appears to not have pain or dyspnea. Monitor for symptoms

## 2023-08-24 ENCOUNTER — TRANSCRIPTION ENCOUNTER (OUTPATIENT)
Age: 73
End: 2023-08-24

## 2023-08-24 PROCEDURE — 99232 SBSQ HOSP IP/OBS MODERATE 35: CPT

## 2023-08-24 PROCEDURE — 99231 SBSQ HOSP IP/OBS SF/LOW 25: CPT

## 2023-08-24 RX ORDER — ACETAMINOPHEN 500 MG
1 TABLET ORAL
Qty: 120 | Refills: 0
Start: 2023-08-24 | End: 2023-09-22

## 2023-08-24 RX ORDER — POLYETHYLENE GLYCOL 3350 17 G/17G
17 POWDER, FOR SOLUTION ORAL
Qty: 30 | Refills: 0
Start: 2023-08-24

## 2023-08-24 RX ADMIN — CHLORHEXIDINE GLUCONATE 1 APPLICATION(S): 213 SOLUTION TOPICAL at 05:32

## 2023-08-24 RX ADMIN — SENNA PLUS 2 TABLET(S): 8.6 TABLET ORAL at 21:57

## 2023-08-24 RX ADMIN — ENOXAPARIN SODIUM 40 MILLIGRAM(S): 100 INJECTION SUBCUTANEOUS at 21:58

## 2023-08-24 NOTE — PROGRESS NOTE ADULT - PROBLEM SELECTOR PLAN 2
likely stercolitis  - s/p disimpaction, enema.
- likely stercolitis  - s/p disimpaction, enema.
- likely stercolitis  - s/p disimpaction, enema.

## 2023-08-24 NOTE — DISCHARGE NOTE PROVIDER - NSDCMRMEDTOKEN_GEN_ALL_CORE_FT
atorvastatin 20 mg oral tablet: 1 tab(s) orally once a day  gabapentin 100 mg oral tablet: 1 tab(s) orally once a day  memantine 10 mg oral tablet: 1 tab(s) orally once a day  pseudoephedrine 30 mg oral tablet: 1 tab(s) orally 4 times a day   acetaminophen 650 mg oral tablet, extended release: 1 tab(s) orally every 6 hours as needed for  mild pain  bisacodyl 10 mg rectal suppository: 1 suppository(ies) rectal once a day as needed for Constipation  gabapentin 100 mg oral tablet: 1 tab(s) orally once a day  memantine 10 mg oral tablet: 1 tab(s) orally once a day  pseudoephedrine 30 mg oral tablet: 1 tab(s) orally 4 times a day

## 2023-08-24 NOTE — DISCHARGE NOTE PROVIDER - NSDCCPCAREPLAN_GEN_ALL_CORE_FT
PRINCIPAL DISCHARGE DIAGNOSIS  Diagnosis: Sepsis  Assessment and Plan of Treatment: You were treated for sepsis due to a pneumonia. You were given antibiotics for the infection, which was completed and led to an improvement of your clinical status. You will be discharged home with home hospice services.      SECONDARY DISCHARGE DIAGNOSES  Diagnosis: Pneumonia, aspiration  Assessment and Plan of Treatment:     Diagnosis: Acute colitis  Assessment and Plan of Treatment:

## 2023-08-24 NOTE — HOSPICE CARE NOTE - CONVESATION DETAILS
Spoke with patient's Tasha who stated that patient's old bed was picked up yesterday. Hospice to send in bed and air mattress today between 1pm and 5pm. Please discharge patient tomorrow. Hospice admission set for Saturday.

## 2023-08-24 NOTE — PROGRESS NOTE ADULT - SUBJECTIVE AND OBJECTIVE BOX
HPI:  Ms Laird is a 72 year old Female with a PMHx of CVA (non-verbal and bedbound), hypothyroidism, hypercholesterolemia bought to the ED from home by sister due to abd pain/ fever and labored breathing. When EMS arrived on scene, pt. was hypoxic at 86% on RA and altered. Pt. was placed in NRB 15L and O2 sat improved to 96%. Pt's sister states pt was exposed to a sick caregiver and starting 3 days ago pt has been noted to have nasal congestion and non productive cough. Of note sister states pt was RUMC due to diarrhea/ constipation and discharged yesterday.     Vital Signs Last 12 Hrs  T(F): 100.3 (08-14-23 @ 01:45), Max: 102.2 (08-14-23 @ 00:15)  HR: 100 (08-14-23 @ 01:45) (100 - 140)  BP: 134/66 (08-14-23 @ 01:45) (104/62 - 134/66)  RR: 20 (08-14-23 @ 01:45) (20 - 20)  SpO2: 97% (08-14-23 @ 01:45) (96% - 98%)    Labs in the ED are significant for Lactate of 3.6 -> 2.9; VBG pCO2 52 and HCO3 30.     CT Chest/Abd/Pelvis showed Bilateral patchy airspace opacities with left lower lobe consolidation consistent with pneumonia in the appropriate clinical setting. Debris or secretions within trachea, with occlusion of small left lower lobe peripheral airway; correlate for aspiration. No evidence of acute pulmonary embolism. Large rectal fecal stool burden, 11 cm AP. Contrast, presumably excreted, within vagina, suspicious for vesicovaginal fistula. Findings suggestive of mild colitis involving segment of descending colon; no abscess. (14 Aug 2023 04:04)      Interval history:     8/22/23: pt alert, non verbal, appears comfortable. No family at bedside.   8/23/23: Pt with eyes open non verbal. Appears comfortable no family at bedside    ADVANCE DIRECTIVES:     [ ] Full Code [ x] DNR/DNI  MOLST  [ ]  Living Will  [ ]   DECISION MAKER(s):  [ ] Health Care Proxy(s)  [x ] Surrogate(s)  [ ] Guardian           Name(s): Phone Number(s):      Answers: Emergency Contact Name Tasha Fields,  Answers: Emergency Contact Phone # [phone],  Answers: Emergency Contact Relationship Sister/HCP    BASELINE (I)ADL(s) (prior to admission):    [place]: [ ]Total  [ ] Moderate [x ]Dependent    Allergies    No Known Allergies    Intolerances  MEDICATIONS  (STANDING):  chlorhexidine 2% Cloths 1 Application(s) Topical <User Schedule>  enoxaparin Injectable 40 milliGRAM(s) SubCutaneous every 24 hours  polyethylene glycol 3350 17 Gram(s) Oral every 12 hours  senna 2 Tablet(s) Oral at bedtime    MEDICATIONS  (PRN):  acetaminophen     Tablet .. 650 milliGRAM(s) Oral every 6 hours PRN Moderate Pain (4 - 6)  bisacodyl Suppository 10 milliGRAM(s) Rectal daily PRN Constipation    PRESENT SYMPTOMS: [x ]Unable to obtain due to poor mentation   Source if other than patient:  [ ]Family   [ ]Team       PAIN AD Score: 0  [email or web address] (press ctrl +  left click to view)      Respiratory Distress Observation Scale (RDOS): 0  A score of 0 to 2 signifies little or no respiratory distress, 3 signifies mild distress, scores 4 to 6 indicate moderate distress, and scores greater than 7 signify severe distress  [email or web address]      PHYSICAL EXAM:  Vital Signs Last 24 Hrs  T(C): 36.8 (24 Aug 2023 11:36), Max: 36.9 (24 Aug 2023 08:23)  T(F): 98.3 (24 Aug 2023 11:36), Max: 98.4 (24 Aug 2023 08:23)  HR: 90 (24 Aug 2023 11:36) (63 - 90)  BP: 101/61 (24 Aug 2023 11:36) (96/53 - 112/67)  BP(mean): --  RR: 18 (24 Aug 2023 11:36) (18 - 18)  SpO2: 95% (24 Aug 2023 08:23) (95% - 95%)    Parameters below as of 24 Aug 2023 08:23  Patient On (Oxygen Delivery Method): room air     I&O's Summary    GENERAL:  [X ] No acute distress [ ]Lethargic  [ ]Unarousable  [ ]Verbal  [ ]Non-Verbal [ ]Cachexia    BEHAVIORAL/PSYCH:  [ ]Alert and Oriented x  [ ] Anxiety [ ] Delirium [ ] Agitation [X ] Calm   EYES: [ ] No scleral icterus [ ] Scleral icterus [X ] Closed  ENMT:  [ ]Dry mouth  [ ]No external oral lesions [X ] No external ear or nose lesions  CARDIOVASCULAR:  [ ]Regular [ ]Irregular [ ]Tachy [ ]Not Tachy  [ ]Stewart [ ] Edema [ ] No edema  PULMONARY:  [ ]Tachypnea  [ ]Audible excessive secretions [X ] No labored breathing [ ] labored breathing  GASTROINTESTINAL: [ ]Soft  [ ]Distended  [ X]Not distended [ ]Non tender [ ]Tender  MUSCULOSKELETAL: [ ]No clubbing [ ] clubbing  [ X] No cyanosis [ ] cyanosis  NEUROLOGIC: [ ]No focal deficits  [ ]Follows commands  [X ]Does not follow commands  [ ]Cognitive impairment  [ ]Dysphagia  [ ]Dysarthria  [ ]Paresis   SKIN: [ ] Jaundiced [X ] Non-jaundiced [ ]Rash [ ]No Rash [ ] Warm [ ] Dry  MISC/LINES: [ ] ET tube [ ] Trach [ ]NGT/OGT [ ]PEG [ ]Pineda    LABS: reviewed by me            RADIOLOGY & ADDITIONAL STUDIES: reviewed by me    EKG: reviewed by me    Palliative Care Spiritual/Emotional Screening Tool Question  Severity (0-4):  Score of 2 or greater indicates recommendation of Chaplaincy referral  Chaplaincy Referral: [ ] Yes [x ] No [ ] Following    Caregiver [place]:  [ ] Yes [ x] No [ ] Deferred  Social Work Referral [ ]  Patient and Family Centered Care Referral [ ]    Anticipatory Grief Present: [ ] Yes [x ] No [ ] Deferred  Social Work Referral [ ]  Patient and Family Centered Care Referral [ ]    Patient discussed with primary medical team MD  Palliative care education provided to patient and/or family

## 2023-08-24 NOTE — DISCHARGE NOTE PROVIDER - REASON FOR ADMISSION
Sepsis secondary to PNA Surgeon/Pathologist Verbiage (Will Incorporate Name Of Surgeon From Intro If Not Blank): operated in two distinct and integrated capacities as the surgeon and pathologist.

## 2023-08-24 NOTE — PROGRESS NOTE ADULT - SUBJECTIVE AND OBJECTIVE BOX
ANNA COBOS  72y  Female  ***My note supersedes ALL resident notes that I sign.  My corrections for their notes are in my note.***    I can be reached directly on [employer]. My office number is [phone]. My personal cell number is [phone].    INTERVAL EVENTS: Here for f/u of CVA. Pt was resting easy today. Pt eats about 25-30% of meals w/ asst.     T(F): 98.3 (08-24-23 @ 11:36), Max: 98.4 (08-24-23 @ 08:23)  HR: 90 (08-24-23 @ 11:36) (63 - 90)  BP: 101/61 (08-24-23 @ 11:36) (96/53 - 112/67)  RR: 18 (08-24-23 @ 11:36) (18 - 18)  SpO2: 95% (08-24-23 @ 08:23) (95% - 95%)    Gen: NAD  HEENT: PERRL, nose clr;  Neck: no nodes, no JVD, thyroid nl  lungs: clr  hrt: s1 s2 rrr no murmur  abd: soft, NT/ND, no HS megaly; no PEG  ext: no edema, no c/c  neuro: awake; not very alert to examiner; non-verbal; functionally quadriplegic  : primafit    LABS:    RADIOLOGY & ADDITIONAL TESTS:    MEDICATIONS:    acetaminophen     Tablet .. 650 milliGRAM(s) Oral every 6 hours PRN  bisacodyl Suppository 10 milliGRAM(s) Rectal daily PRN  chlorhexidine 2% Cloths 1 Application(s) Topical <User Schedule>  enoxaparin Injectable 40 milliGRAM(s) SubCutaneous every 24 hours  polyethylene glycol 3350 17 Gram(s) Oral every 12 hours  senna 2 Tablet(s) Oral at bedtime

## 2023-08-24 NOTE — PROGRESS NOTE ADULT - ASSESSMENT
72 year old woman with a PMH of CVA (non-verbal and bedbound) with residual upper and LE weakness, bed bound, hypothyroidism, hypercholesterolemia bought to the ED from home by sister for SOB and fever, When EMS arrived on scene, pt. was hypoxic at 86% on RA and altered. Pt. was placed in NRB 15L and O2 sat improved to 96%. Pt's sister states patient has nasal congestion for the last few days with dry cough. In the ED, T max 102.2F, , BP stable, labs showed lactate 3.6, CT Chest showed left lower lobe consolidation. Initially, admitted to SDU for closer monitoring.    # family has opted for hospice  hospice noted  pall care noted  pt is basically CMO (not officially) - I see no reason for labs or studies at this point  below should be read in this context of hospice    # Acute hypoxic respiratory failure (resolved) 2/2 Left lower lobe pneumonia: suspect gram negative bacteria causing severe sepsis present on admission (fever, tachycardia); Recent URI   CT chest showed left lower lobe consolidation.   lactate 3.6 ->1.4; procal 1.4  blood cx NGTD  ID noted  abx: completed rocephin on 8/22  on RA    # Speech and swallow: recommended NPO.   Aspiration precaution.   pt never wanted PEG, NGT d/c'd as well  family wanted to try pleasure feeds, which pt can do, but takes a long time to feed a small amt of food    # Colitis - possible stercoral colitis with fecal burden   previously had digital rectal disimpaction + water enema    # Possible vesicovaginal fistula   CT abdomen showed Large rectal fecal stool burden, 11 cm AP.   Contrast, presumably excreted, within vagina, suspicious for vesicovaginal fistula. Findings suggestive of mild colitis involving segment of descending colon; no abscess.  not going to intervene surgically given hospice status    # History of CVA; Functional quadriplegia; Dysphagia; non-verbal  Patient is bedbound  off loading as possible    # CODE Status DNR/DNI    Dispo: hospice/pall care f/u; avoid labs/studies;   eventually, pt will go home on hospice - f/u CM - stable for d/c (anticipate home frankie ( 9am) - waiting on DME at home (today) w/ hospice (intake on SAT)) 72 year old woman with a PMH of CVA (non-verbal and bedbound) with residual upper and LE weakness, bed bound, hypothyroidism, hypercholesterolemia bought to the ED from home by sister for SOB and fever, When EMS arrived on scene, pt. was hypoxic at 86% on RA and altered. Pt. was placed in NRB 15L and O2 sat improved to 96%. Pt's sister states patient has nasal congestion for the last few days with dry cough. In the ED, T max 102.2F, , BP stable, labs showed lactate 3.6, CT Chest showed left lower lobe consolidation. Initially, admitted to SDU for closer monitoring.    # family has opted for hospice  hospice noted  pall care noted  pt is basically CMO (not officially) - I see no reason for labs or studies at this point  below should be read in this context of hospice    # Acute hypoxic respiratory failure (resolved) 2/2 Left lower lobe pneumonia: suspect gram negative bacteria causing severe sepsis present on admission (fever, tachycardia); Recent URI   CT chest showed left lower lobe consolidation.   lactate 3.6 ->1.4; procal 1.4  blood cx NGTD  ID noted  abx: completed rocephin on 8/22  on RA    # Speech and swallow: recommended NPO.   Aspiration precaution.   pt never wanted PEG, NGT d/c'd as well  family wanted to try pleasure feeds, which pt can do, but takes a long time to feed a small amt of food    # Colitis - possible stercoral colitis with fecal burden   previously had digital rectal disimpaction + water enema    # Possible vesicovaginal fistula   CT abdomen showed Large rectal fecal stool burden, 11 cm AP.   Contrast, presumably excreted, within vagina, suspicious for vesicovaginal fistula. Findings suggestive of mild colitis involving segment of descending colon; no abscess.  not going to intervene surgically given hospice status    # History of CVA; Functional quadriplegia; Dysphagia; non-verbal  Patient is bedbound  off loading as possible    # CODE Status DNR/DNI    Dispo: hospice/pall care f/u; avoid labs/studies;   eventually, pt will go home on hospice - f/u CM (set up HHA) - stable for d/c (for home frankie ( 9am) - waiting on home DME (today) w/ hospice (intake on SAT))

## 2023-08-24 NOTE — DISCHARGE NOTE PROVIDER - CARE PROVIDER_API CALL
Ferdinand Brumfield.  Hospice/Palliative Medicine  [address] [place] [address], [address] [place] [address]  Phone: [phone]  Fax: [phone]  Follow Up Time:

## 2023-08-24 NOTE — PROGRESS NOTE ADULT - PROBLEM SELECTOR PLAN 3
Family in agreement with d/c home on hospice.
Family in agreement with d/c home on hospice
See note 8/21

## 2023-08-24 NOTE — PROGRESS NOTE ADULT - PROBLEM SELECTOR PLAN 1
- c/w IV ceftriaxone until 8/23   - high risk  - monitor WBC.
s/p Ceftriaxone   Monitor for comfort.
s/p Ceftriaxone   Monitor for comfort

## 2023-08-24 NOTE — PROGRESS NOTE ADULT - PROVIDER SPECIALTY LIST ADULT
Internal Medicine
Pulmonology
Hospitalist
Hospitalist
Internal Medicine
Pulmonology
Hospitalist
Hospitalist
Infectious Disease
Infectious Disease
Internal Medicine
Infectious Disease
Palliative Care

## 2023-08-24 NOTE — DISCHARGE NOTE PROVIDER - HOSPITAL COURSE
Ms Laird is a 72 year old Female with a PMHx of CVA (non-verbal and bedbound), hypothyroidism, hypercholesterolemia bought to the ED from home by sister due to abd pain/ fever and labored breathing. When EMS arrived on scene, pt. was hypoxic at 86% on RA and altered. Pt. was placed in NRB 15L and O2 sat improved to 96%. Pt's sister states pt was exposed to a sick caregiver and starting 3 days ago pt has been noted to have nasal congestion and non productive cough. Of note sister states pt was RUMC due to diarrhea/ constipation and discharged yesterday.     Vital Signs Last 12 Hrs  T(F): 100.3 (08-14-23 @ 01:45), Max: 102.2 (08-14-23 @ 00:15)  HR: 100 (08-14-23 @ 01:45) (100 - 140)  BP: 134/66 (08-14-23 @ 01:45) (104/62 - 134/66)  RR: 20 (08-14-23 @ 01:45) (20 - 20)  SpO2: 97% (08-14-23 @ 01:45) (96% - 98%)    Labs in the ED are significant for Lactate of 3.6 -> 2.9; VBG pCO2 52 and HCO3 30.     CT Chest/Abd/Pelvis showed Bilateral patchy airspace opacities with left lower lobe consolidation consistent with pneumonia in the appropriate clinical setting. Debris or secretions within trachea, with occlusion of small left lower lobe peripheral airway; correlate for aspiration. No evidence of acute pulmonary embolism. Large rectal fecal stool burden, 11 cm AP. Contrast, presumably excreted, within vagina, suspicious for vesicovaginal fistula.    On the medical floor, the patient completed a course of ceftriaxone antibiotics for pneumonia, and the patient's health care proxy, her sister Allyn, agreed to proceed with home hospice services.

## 2023-08-25 ENCOUNTER — TRANSCRIPTION ENCOUNTER (OUTPATIENT)
Age: 73
End: 2023-08-25

## 2023-08-25 VITALS
DIASTOLIC BLOOD PRESSURE: 58 MMHG | RESPIRATION RATE: 18 BRPM | SYSTOLIC BLOOD PRESSURE: 111 MMHG | TEMPERATURE: 98 F | HEART RATE: 56 BPM

## 2023-08-25 RX ADMIN — CHLORHEXIDINE GLUCONATE 1 APPLICATION(S): 213 SOLUTION TOPICAL at 06:55

## 2023-08-25 NOTE — DISCHARGE NOTE NURSING/CASE MANAGEMENT/SOCIAL WORK - NSDCPEFALRISK_GEN_ALL_CORE
For information on Fall & Injury Prevention, visit: [email or web address] OR  [email or web address] OR  https://www.cdc.gov/steadi/patient.html

## 2023-08-25 NOTE — DISCHARGE NOTE NURSING/CASE MANAGEMENT/SOCIAL WORK - PATIENT PORTAL LINK FT
You can access the FollowMyHealth Patient Portal offered by [facility] by registering at the following website: [email or web address]. By joining [employer]’s FollowMyHealth portal, you will also be able to view your health information using other applications (apps) compatible with our system.

## 2023-08-26 ENCOUNTER — TRANSCRIPTION ENCOUNTER (OUTPATIENT)
Age: 73
End: 2023-08-26

## 2023-08-28 ENCOUNTER — TRANSCRIPTION ENCOUNTER (OUTPATIENT)
Age: 73
End: 2023-08-28

## 2023-08-30 DIAGNOSIS — E03.9 HYPOTHYROIDISM, UNSPECIFIED: ICD-10-CM

## 2023-08-30 DIAGNOSIS — R53.2 FUNCTIONAL QUADRIPLEGIA: ICD-10-CM

## 2023-08-30 DIAGNOSIS — Z74.01 BED CONFINEMENT STATUS: ICD-10-CM

## 2023-08-30 DIAGNOSIS — Z51.5 ENCOUNTER FOR PALLIATIVE CARE: ICD-10-CM

## 2023-08-30 DIAGNOSIS — Z86.73 PERSONAL HISTORY OF TRANSIENT ISCHEMIC ATTACK (TIA), AND CEREBRAL INFARCTION WITHOUT RESIDUAL DEFICITS: ICD-10-CM

## 2023-08-30 DIAGNOSIS — J69.0 PNEUMONITIS DUE TO INHALATION OF FOOD AND VOMIT: ICD-10-CM

## 2023-08-30 DIAGNOSIS — R13.10 DYSPHAGIA, UNSPECIFIED: ICD-10-CM

## 2023-08-30 DIAGNOSIS — A41.9 SEPSIS, UNSPECIFIED ORGANISM: ICD-10-CM

## 2023-08-30 DIAGNOSIS — N82.0 VESICOVAGINAL FISTULA: ICD-10-CM

## 2023-08-30 DIAGNOSIS — R65.20 SEVERE SEPSIS WITHOUT SEPTIC SHOCK: ICD-10-CM

## 2023-08-30 DIAGNOSIS — Z66 DO NOT RESUSCITATE: ICD-10-CM

## 2023-08-30 DIAGNOSIS — J96.01 ACUTE RESPIRATORY FAILURE WITH HYPOXIA: ICD-10-CM

## 2023-08-30 DIAGNOSIS — E87.20 ACIDOSIS, UNSPECIFIED: ICD-10-CM

## 2023-08-30 DIAGNOSIS — E78.00 PURE HYPERCHOLESTEROLEMIA, UNSPECIFIED: ICD-10-CM

## 2023-08-30 DIAGNOSIS — K59.00 CONSTIPATION, UNSPECIFIED: ICD-10-CM

## 2023-08-30 DIAGNOSIS — K52.9 NONINFECTIVE GASTROENTERITIS AND COLITIS, UNSPECIFIED: ICD-10-CM

## 2023-09-08 NOTE — ED ADULT NURSE NOTE - NS ED NURSE REPORT GIVEN DT
Chief Complaint   Patient presents with   • Covid Infection     Entered automatically based on patient selection in Patient Portal.       HISTORY OF PRESENT ILLNESS:    Maury Starr is a 66 year old male who is requesting an E-Visit for evaluation of COVID-19.  Patient is fully vaccinated for COVID-19.  Reports symptom onset x2 days PTA.  Notes associated runny nose, nasal congestion, and cough.  He reports a positive home COVID-19 test but states that it was , and is retesting with a scheduled PCR.  No red flag symptoms reported.    The patient's responses to the questions contained in the condition-specific questionnaire submission were reviewed.    MEDICATIONS  The medication list in the medical record as well as updates to the medication list submitted by the patient with this E-Visit were reviewed.      HISTORIES:  I have personally reviewed and updated the following electronic medical record sections: Current medications, Allergies, Problem list, Past Medical History, Past Surgical History, and Social History    ALLERGIES:   ALLERGIES:   Allergen Reactions   • No Known Allergies Other (See Comments)   • Seasonal Runny Nose        ASSESSMENT:   Maury was seen today for covid infection.    Diagnoses and all orders for this visit:    2019 novel coronavirus detected  -     nirmatrelvir & ritonavir 300mg/100mg (PAXLOVID) 20 x 150 MG & 10 x 100MG; Take 300 mg nirmatrelvir (two 150 mg tablets) with 100 mg ritonavir (one 100 mg tablet), with all three tablets taken together by mouth twice daily for 5 days.      DDIs, GFR, and risk factors reviewed.  HOLD simvastatin for the duration of treatment +5 days.    Per CDC guidelines, you should self quarantine for 5 days from symptom onset.  If you are asymptomatic, or your symptoms are resolving, you may leave your house AFTER 5 days, but should continue to wear a mask around others for an additional 5 days.  If you have a fever, you should continue to stay  home in isolation until your fever resolves.     ?20 minutes were spent reviewing the chart, electronic questionnaire, completing documentation, and referring/communicating with other health care professionals.      FOLLOW-UP:    Return if symptoms worsen or fail to improve.    PATIENT INSTRUCTIONS:   Attached in after visit summary.     The patient should follow up with primary physician or to go to the ER sooner if symptoms get worse or if new symptoms not indicated through this asynchronous visit  appear.    The patient will address any questions about the diagnosis or plan of care by calling the nurse triage number listed in the visit summary.     CONSENT:  This visit is being performed virtually via asynchronous E-visit and written consent by the patient was submitted.     Clinical Location: Home  Maury Starr location Home and is physically present in   the [facility] at the time of this visit.     Leny Heaton NP  9/8/2023  1:49 PM             14-Aug-2023 18:34

## 2023-11-29 NOTE — H&P ADULT - NSHPSOURCEINFORD_GEN_ALL_CORE
Patient Pt. Declines use of CPAP tonight.  Discharge tomorrow.  Machine removed from room, order modification to PRN sent to Dr. Ferrell for Co-signature

## 2024-02-06 ENCOUNTER — INPATIENT (INPATIENT)
Facility: HOSPITAL | Age: 74
LOS: 2 days | Discharge: HOSPICE | DRG: 70 | End: 2024-02-09
Attending: STUDENT IN AN ORGANIZED HEALTH CARE EDUCATION/TRAINING PROGRAM | Admitting: INTERNAL MEDICINE
Payer: MEDICARE

## 2024-02-06 VITALS
DIASTOLIC BLOOD PRESSURE: 55 MMHG | SYSTOLIC BLOOD PRESSURE: 110 MMHG | OXYGEN SATURATION: 100 % | RESPIRATION RATE: 18 BRPM | HEART RATE: 44 BPM

## 2024-02-06 DIAGNOSIS — R41.82 ALTERED MENTAL STATUS, UNSPECIFIED: ICD-10-CM

## 2024-02-06 LAB
ALBUMIN SERPL ELPH-MCNC: 4 G/DL — SIGNIFICANT CHANGE UP (ref 3.5–5.2)
ALP SERPL-CCNC: 80 U/L — SIGNIFICANT CHANGE UP (ref 30–115)
ALT FLD-CCNC: 11 U/L — SIGNIFICANT CHANGE UP (ref 0–41)
ANION GAP SERPL CALC-SCNC: 10 MMOL/L — SIGNIFICANT CHANGE UP (ref 7–14)
AST SERPL-CCNC: 15 U/L — SIGNIFICANT CHANGE UP (ref 0–41)
BASOPHILS # BLD AUTO: 0.03 K/UL — SIGNIFICANT CHANGE UP (ref 0–0.2)
BASOPHILS NFR BLD AUTO: 0.6 % — SIGNIFICANT CHANGE UP (ref 0–1)
BILIRUB SERPL-MCNC: 0.7 MG/DL — SIGNIFICANT CHANGE UP (ref 0.2–1.2)
BUN SERPL-MCNC: 17 MG/DL — SIGNIFICANT CHANGE UP (ref 10–20)
CALCIUM SERPL-MCNC: 9.3 MG/DL — SIGNIFICANT CHANGE UP (ref 8.4–10.5)
CHLORIDE SERPL-SCNC: 103 MMOL/L — SIGNIFICANT CHANGE UP (ref 98–110)
CO2 SERPL-SCNC: 28 MMOL/L — SIGNIFICANT CHANGE UP (ref 17–32)
CREAT SERPL-MCNC: 0.5 MG/DL — LOW (ref 0.7–1.5)
EGFR: 98 ML/MIN/1.73M2 — SIGNIFICANT CHANGE UP
EOSINOPHIL # BLD AUTO: 0.13 K/UL — SIGNIFICANT CHANGE UP (ref 0–0.7)
EOSINOPHIL NFR BLD AUTO: 2.5 % — SIGNIFICANT CHANGE UP (ref 0–8)
FLUAV AG NPH QL: SIGNIFICANT CHANGE UP
FLUBV AG NPH QL: SIGNIFICANT CHANGE UP
GLUCOSE SERPL-MCNC: 97 MG/DL — SIGNIFICANT CHANGE UP (ref 70–99)
HCT VFR BLD CALC: 40.8 % — SIGNIFICANT CHANGE UP (ref 37–47)
HGB BLD-MCNC: 13.2 G/DL — SIGNIFICANT CHANGE UP (ref 12–16)
IMM GRANULOCYTES NFR BLD AUTO: 0.6 % — HIGH (ref 0.1–0.3)
LYMPHOCYTES # BLD AUTO: 2.2 K/UL — SIGNIFICANT CHANGE UP (ref 1.2–3.4)
LYMPHOCYTES # BLD AUTO: 42.9 % — SIGNIFICANT CHANGE UP (ref 20.5–51.1)
MCHC RBC-ENTMCNC: 30.2 PG — SIGNIFICANT CHANGE UP (ref 27–31)
MCHC RBC-ENTMCNC: 32.4 G/DL — SIGNIFICANT CHANGE UP (ref 32–37)
MCV RBC AUTO: 93.4 FL — SIGNIFICANT CHANGE UP (ref 81–99)
MONOCYTES # BLD AUTO: 0.36 K/UL — SIGNIFICANT CHANGE UP (ref 0.1–0.6)
MONOCYTES NFR BLD AUTO: 7 % — SIGNIFICANT CHANGE UP (ref 1.7–9.3)
NEUTROPHILS # BLD AUTO: 2.38 K/UL — SIGNIFICANT CHANGE UP (ref 1.4–6.5)
NEUTROPHILS NFR BLD AUTO: 46.4 % — SIGNIFICANT CHANGE UP (ref 42.2–75.2)
NRBC # BLD: 0 /100 WBCS — SIGNIFICANT CHANGE UP (ref 0–0)
PLATELET # BLD AUTO: 228 K/UL — SIGNIFICANT CHANGE UP (ref 130–400)
PMV BLD: 10.3 FL — SIGNIFICANT CHANGE UP (ref 7.4–10.4)
POTASSIUM SERPL-MCNC: 4.7 MMOL/L — SIGNIFICANT CHANGE UP (ref 3.5–5)
POTASSIUM SERPL-SCNC: 4.7 MMOL/L — SIGNIFICANT CHANGE UP (ref 3.5–5)
PROT SERPL-MCNC: 6.5 G/DL — SIGNIFICANT CHANGE UP (ref 6–8)
RBC # BLD: 4.37 M/UL — SIGNIFICANT CHANGE UP (ref 4.2–5.4)
RBC # FLD: 12.4 % — SIGNIFICANT CHANGE UP (ref 11.5–14.5)
RSV RNA NPH QL NAA+NON-PROBE: SIGNIFICANT CHANGE UP
SARS-COV-2 RNA SPEC QL NAA+PROBE: SIGNIFICANT CHANGE UP
SODIUM SERPL-SCNC: 141 MMOL/L — SIGNIFICANT CHANGE UP (ref 135–146)
TROPONIN T, HIGH SENSITIVITY RESULT: 29 NG/L — HIGH (ref 6–13)
TROPONIN T, HIGH SENSITIVITY RESULT: 31 NG/L — HIGH (ref 6–13)
TSH SERPL-MCNC: 2.3 UIU/ML — SIGNIFICANT CHANGE UP (ref 0.27–4.2)
WBC # BLD: 5.13 K/UL — SIGNIFICANT CHANGE UP (ref 4.8–10.8)
WBC # FLD AUTO: 5.13 K/UL — SIGNIFICANT CHANGE UP (ref 4.8–10.8)

## 2024-02-06 PROCEDURE — 71045 X-RAY EXAM CHEST 1 VIEW: CPT | Mod: 26

## 2024-02-06 PROCEDURE — 93306 TTE W/DOPPLER COMPLETE: CPT

## 2024-02-06 PROCEDURE — 82533 TOTAL CORTISOL: CPT

## 2024-02-06 PROCEDURE — 80053 COMPREHEN METABOLIC PANEL: CPT

## 2024-02-06 PROCEDURE — 70450 CT HEAD/BRAIN W/O DYE: CPT | Mod: 26,MA

## 2024-02-06 PROCEDURE — 70551 MRI BRAIN STEM W/O DYE: CPT

## 2024-02-06 PROCEDURE — 85025 COMPLETE CBC W/AUTO DIFF WBC: CPT

## 2024-02-06 PROCEDURE — 86803 HEPATITIS C AB TEST: CPT

## 2024-02-06 PROCEDURE — 36415 COLL VENOUS BLD VENIPUNCTURE: CPT

## 2024-02-06 PROCEDURE — 99285 EMERGENCY DEPT VISIT HI MDM: CPT | Mod: GC

## 2024-02-06 PROCEDURE — 0241U: CPT

## 2024-02-06 PROCEDURE — 95819 EEG AWAKE AND ASLEEP: CPT

## 2024-02-06 PROCEDURE — 84443 ASSAY THYROID STIM HORMONE: CPT

## 2024-02-06 PROCEDURE — 99223 1ST HOSP IP/OBS HIGH 75: CPT

## 2024-02-06 RX ORDER — HYDROCORTISONE 20 MG
100 TABLET ORAL ONCE
Refills: 0 | Status: COMPLETED | OUTPATIENT
Start: 2024-02-06 | End: 2024-02-06

## 2024-02-06 RX ORDER — GABAPENTIN 400 MG/1
100 CAPSULE ORAL AT BEDTIME
Refills: 0 | Status: DISCONTINUED | OUTPATIENT
Start: 2024-02-06 | End: 2024-02-09

## 2024-02-06 RX ORDER — SODIUM CHLORIDE 9 MG/ML
500 INJECTION INTRAMUSCULAR; INTRAVENOUS; SUBCUTANEOUS ONCE
Refills: 0 | Status: COMPLETED | OUTPATIENT
Start: 2024-02-06 | End: 2024-02-06

## 2024-02-06 RX ORDER — POLYETHYLENE GLYCOL 3350 17 G/17G
17 POWDER, FOR SOLUTION ORAL DAILY
Refills: 0 | Status: DISCONTINUED | OUTPATIENT
Start: 2024-02-06 | End: 2024-02-09

## 2024-02-06 RX ORDER — ACETAMINOPHEN 500 MG
650 TABLET ORAL EVERY 6 HOURS
Refills: 0 | Status: DISCONTINUED | OUTPATIENT
Start: 2024-02-06 | End: 2024-02-09

## 2024-02-06 RX ORDER — TRAZODONE HCL 50 MG
50 TABLET ORAL AT BEDTIME
Refills: 0 | Status: DISCONTINUED | OUTPATIENT
Start: 2024-02-06 | End: 2024-02-06

## 2024-02-06 RX ORDER — ENOXAPARIN SODIUM 100 MG/ML
40 INJECTION SUBCUTANEOUS EVERY 24 HOURS
Refills: 0 | Status: DISCONTINUED | OUTPATIENT
Start: 2024-02-06 | End: 2024-02-09

## 2024-02-06 RX ADMIN — Medication 100 MILLIGRAM(S): at 16:49

## 2024-02-06 RX ADMIN — SODIUM CHLORIDE 500 MILLILITER(S): 9 INJECTION INTRAMUSCULAR; INTRAVENOUS; SUBCUTANEOUS at 13:00

## 2024-02-06 NOTE — ED PROVIDER NOTE - ATTENDING CONTRIBUTION TO CARE
I personally evaluated patient. I agree with the findings and plan with all documentation on chart except as documented  in my note.    72-year-old female past medical history of CVA with residual right-sided weakness, nonverbal and bedbound at baseline, dyslipidemia, hypothyroidism who presents to the emergency department for altered mental status and lethargy.  Additional history obtained from EMS and patient's sister reports this morning patient was not responsive.  Patient slightly more awake when EMS arrived and patient was bradycardic and route to the hospital.    Patient brought to critical care emergency department.  Patient's heart rate is 44 and patient is in sinus bradycardia.  Initial systolic blood pressure is 110.  Patient appears dehydrated and has clear lungs, benign abdominal exam.  Patient has diffuse muscle atrophy with right-sided baseline weakness.  Large-bore IV access obtained and full labs sent, thyroid panel sent.  Patient went for emergent head CT.  Patient was given hydrocortisone for presumptive/possible adrenal crisis.  Medications reviewed and patient not on any rate lowering medications.  CT head shows no acute changes.  Electrolytes were unremarkable and patient has normal white blood cell count.  Chest x-ray is clear.  Repeat troponin has no delta.  Patient to be admitted to medicine for further workup and management and patient remains hemodynamically stable though bradycardic.  Sister spoken to in detail about results and plan of care, need for admission.

## 2024-02-06 NOTE — ED PROVIDER NOTE - PHYSICAL EXAMINATION
GENERAL: in no acute distress  SKIN: warm, well perfused  HEAD: Normocephalic; atraumatic.  EYES: no conjunctival erythema  ENT: airway clear.  CARD: Regular rate and rhythm.   RESP: LCTAB; No wheezes  ABD: soft, nontender, and nondistended  Upper EXT: contracted, Rad pulses 2+ symm, cap refill <2 secs  Lower EXT: no edema, contracted

## 2024-02-06 NOTE — ED ADULT NURSE NOTE - NSFALLRISKINTERV_ED_ALL_ED
Assistance OOB with selected safe patient handling equipment if applicable/Assistance with ambulation/Communicate fall risk and risk factors to all staff, patient, and family/Monitor gait and stability/Monitor for mental status changes and reorient to person, place, and time, as needed/Provide visual cue: yellow wristband, yellow gown, etc/Reinforce activity limits and safety measures with patient and family/Toileting schedule using arm’s reach rule for commode and bathroom/Use of alarms - bed, stretcher, chair and/or video monitoring/Call bell, personal items and telephone in reach/Instruct patient to call for assistance before getting out of bed/chair/stretcher/Non-slip footwear applied when patient is off stretcher/[place] to call system/Physically safe environment - no spills, clutter or unnecessary equipment/Purposeful Proactive Rounding/Room/bathroom lighting operational, light cord in reach

## 2024-02-06 NOTE — ED ADULT NURSE NOTE - OBJECTIVE STATEMENT
pt brought in from home for being unresponsive and having weakness. Pt awake & alert now. Pt nonverbal at baseline, hx strokes, has contractions throughout body. Sister at bedside- main caregiver. pt brought in from home for being unresponsive and having weakness. Pt awake & alert now. Pt nonverbal at baseline, hx strokes, has contractions throughout body. Sister at bedside- main caregiver... Pt has blanchable redness to b/l buttock cheeks.

## 2024-02-06 NOTE — ED PROVIDER NOTE - OBJECTIVE STATEMENT
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 Sister stated that she found patient to be not responsive around 11am today. Called for EMS. Per EMS, when they arrived, patient was became alert and patients sister said she was more awake. However she was bradycardic in the field. Sister and niece at bedside states no intubation and no CPR. However they are agreeable with TVP and medications. 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 Sister stated that she found patient to be not responsive around 11am today. Called for EMS. Per EMS, when they arrived, patient was became alert and patients sister said she was more awake. However she was bradycardic in the field. Sister and niece at bedside states no intubation and no CPR. However they are agreeable with TVP and medications. Sister denies patient complaining of any pain.

## 2024-02-06 NOTE — ED PROVIDER NOTE - CLINICAL SUMMARY MEDICAL DECISION MAKING FREE TEXT BOX
72-year-old female past medical history of CVA with residual right-sided weakness, nonverbal and bedbound at baseline, dyslipidemia, hypothyroidism who presents to the emergency department for altered mental status and lethargy.  Additional history obtained from EMS and patient's sister reports this morning patient was not responsive.  Patient slightly more awake when EMS arrived and patient was bradycardic and route to the hospital.    Patient brought to critical care emergency department.  Patient's heart rate is 44 and patient is in sinus bradycardia.  Initial systolic blood pressure is 110.  Patient appears dehydrated and has clear lungs, benign abdominal exam.  Patient has diffuse muscle atrophy with right-sided baseline weakness.  Large-bore IV access obtained and full labs sent, thyroid panel sent.  Patient went for emergent head CT.  Patient was given hydrocortisone for presumptive/possible adrenal crisis.  Medications reviewed and patient not on any rate lowering medications.  CT head shows no acute changes.  Electrolytes were unremarkable and patient has normal white blood cell count.  Chest x-ray is clear.  Repeat troponin has no delta.  Patient to be admitted to medicine for further workup and management and patient remains hemodynamically stable though bradycardic.  Sister spoken to in detail about results and plan of care, need for admission.

## 2024-02-06 NOTE — ED PROVIDER NOTE - CARE PLAN
1 Principal Discharge DX:	AMS (altered mental status)  Secondary Diagnosis:	Bradycardia  Secondary Diagnosis:	Elevated troponin

## 2024-02-06 NOTE — ED PROVIDER NOTE - DIFFERENTIAL DIAGNOSIS
Differential Diagnosis The differential diagnosis for patients clinical presentation includes but is not limited to:  Adrenal crisis, hypothyroidism, sepsis, ACS, MI, aortic dissection, pneumothorax, pneumonia, MSK, pulmonary embolism

## 2024-02-06 NOTE — ED ADULT TRIAGE NOTE - CHIEF COMPLAINT QUOTE
BIBEMS from home c/o generalized weakness since this AM. Bradycardia noted in triage. As per EMS. nonverbal @baseline according to family. Unable to get temp in triage.

## 2024-02-06 NOTE — H&P ADULT - NSHPPHYSICALEXAM_GEN_ALL_CORE
T(C): --  HR: 63 (02-06-24 @ 19:35) (44 - 63)  BP: 115/56 (02-06-24 @ 19:35) (110/55 - 115/56)  RR: 19 (02-06-24 @ 19:35) (18 - 19)  SpO2: 97% (02-06-24 @ 19:35) (97% - 100%)    CONSTITUTIONAL: Not openig her eyes, skeeze hand when asked   EYES: PERRLA and symmetric, EOMI, No conjunctival or scleral injection, non-icteric  ENMT: Oral mucosa with moist membranes.    RESP: No respiratory distress, no use of accessory muscles; CTA b/l, no WRR  CV: RRR, +S1S2, no MRG; no JVD; no peripheral edema  GI: Soft, NT, ND, no rebound, no guarding; no palpable masses; no hepatosplenomegaly; no hernia palpated  MSK: Bedbound

## 2024-02-06 NOTE — H&P ADULT - HISTORY OF PRESENT ILLNESS
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 Sister stated that she found patient to be not responsive around 11am today. Called for EMS. Per EMS, when they arrived, patient was became alert and patients sister said she was more awake. However she was bradycardic in the field. Sister and niece at bedside states no intubation and no CPR. However they are agreeable with TVP and medications.    In the ED: BP: 110/5, HR:44, RR" 18, SpO2: 100 on RA    EKG Sinus tracee    Labs: CBC and CMP wnl  Trop -> 29-> 31    CTH:   1.  Cerebral, cerebellar, and corpus callosal atrophy.  2.  Periventricular white matter hypodensities, nonspecific, differential diagnostic possibilities include ischemic change, gliosis or demyelination.    CXR:  No radiographic evidence of acute cardiopulmonary disease.      s/p 500 Bolus and SoluCortef 100 once 72 year old Female with a PMHx of CVA (non-verbal and bedbound) with residual right sided weakness, hypothyroidism, HLD bought to the ED with sister for being unresponsive. Patient just got out of Hospice. Sister stated that she found patient to be not responsive around 11am today. Called for EMS. Per EMS, when they arrived, patient was became alert and patients sister said she was more awake. However she was bradycardic in the field. Sister and niece at bedside states no intubation and no CPR. However they are agreeable with TVP and medications.    As per sister: Baseline is non verbal, bedbound, opens her eyes to verbal stimuli, follows simple command (ex: lift your left arm if you want ..), eats pureed food although she aspirates as family opted for comfort feeding as refused PEG tube and NGT in the past)     In the ED: BP: 110/5, HR:44, RR" 18, SpO2: 100 on RA    EKG Sinus tracee    Labs: CBC and CMP wnl  Trop -> 29-> 31    CTH:   1.  Cerebral, cerebellar, and corpus callosal atrophy.  2.  Periventricular white matter hypodensities, nonspecific, differential diagnostic possibilities include ischemic change, gliosis or demyelination.    CXR:  No radiographic evidence of acute cardiopulmonary disease.    s/p 500 Bolus and SoluCortef 100 once

## 2024-02-06 NOTE — H&P ADULT - ATTENDING COMMENTS
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 Sister stated that she found patient to be not responsive around 11am today. Called for EMS. Per EMS, when they arrived, patient was became alert and patients sister said she was more awake. Found to be Bradycardic    Agree  with assessment  except for changes below.   Vital Signs (24 Hrs):  T(C): --  HR: 63 (02-06-24 @ 19:35) (44 - 63)  BP: 115/56 (02-06-24 @ 19:35) (110/55 - 115/56)  RR: 19 (02-06-24 @ 19:35) (18 - 19)  SpO2: 97% (02-06-24 @ 19:35) (97% - 100%)     CTH:   1.  Cerebral, cerebellar, and corpus callosal atrophy.  2.  Periventricular white matter hypodensities, nonspecific, differential diagnostic possibilities include ischemic change, gliosis or demyelination.    IMPRESSION  AMS  Etiology Uncertain  Hx CVA  Non Verbal abd Bed Bound at bedside   Bradycardia  Resolved   Aspiration  - No sign of infectious process  - f/u TSH, B12, Folate (Not sure if pt has hypothyroidism, but she was not on Levothyroxine even before last admission)  - c/w Dulcolax Supp until BM  - f/u rEEG  - f/u EP cs for tracee   - Speech and swallow: recommended NPO last admission   - Aspiration precaution.   - pt never wanted PEG, NGT  - Family opted for pleasure feeds  - Keep atropine at bedside     Hx CVA; Functional quadriplegia; Dysphagia; non-verbal  - Patient is bedbound  - off loading as possible    Hypothyroidism???  - dx has been included in H&Ps, however pt not on Levothyroxine as per Sister  - f/u TSH      Hx Anxiety/Insomnia:  - Hold  Trazodone 50 HS  - c/w Home Gabapentin 100 QD 72 year old Female with a PMHx of CVA (non-verbal and bedbound) with residual right sided weakness, hypothyroidism, HLD bought to the ED with sister for being unresponsive. Sister stated that she found patient to be not responsive around 11am today. Called for EMS. Per EMS, when they arrived, patient was became alert and patients sister said she was more awake. Found to be Bradycardic    Agree  with assessment  except for changes below.   Vital Signs (24 Hrs):  T(C): --  HR: 63 (02-06-24 @ 19:35) (44 - 63)  BP: 115/56 (02-06-24 @ 19:35) (110/55 - 115/56)  RR: 19 (02-06-24 @ 19:35) (18 - 19)  SpO2: 97% (02-06-24 @ 19:35) (97% - 100%)     CTH:   1.  Cerebral, cerebellar, and corpus callosal atrophy.  2.  Periventricular white matter hypodensities, nonspecific, differential diagnostic possibilities include ischemic change, gliosis or demyelination.    IMPRESSION  AMS  Etiology Uncertain  Hx CVA  Non Verbal abd Bed Bound at bedside   Bradycardia  Resolved   Aspiration  - No sign of infectious process  - f/u TSH, B12, Folate (Not sure if pt has hypothyroidism, but she was not on Levothyroxine even before last admission)  - c/w Dulcolax Supp until BM  - f/u rEEG  - f/u EP cs for tracee   - Speech and swallow: recommended NPO last admission   - Aspiration precaution.   - pt never wanted PEG, NGT  - Family opted for pleasure feeds  - Keep atropine at bedside     Hx CVA; Functional quadriplegia; Dysphagia; non-verbal  - Patient is bedbound  - off loading as possible    Hx  Constipation- C/w Bowel Regimen     Hypothyroidism???  - dx has been included in H&Ps, however pt not on Levothyroxine as per Sister  - f/u TSH      Hx Anxiety/Insomnia:  - Hold  Trazodone 50 HS  - c/w Home Gabapentin 100 QD 72 year old Female with a PMHx of CVA (non-verbal and bedbound) with residual right sided weakness, hypothyroidism, HLD bought to the ED with sister for being unresponsive. Sister stated that she found patient to be not responsive around 11am today. Called for EMS. Per EMS, when they arrived, patient was became alert and patients sister said she was more awake. Found to be Bradycardic    Agree  with assessment  except for changes below.   Vital Signs (24 Hrs):  T(C): --  HR: 63 (02-06-24 @ 19:35) (44 - 63)  BP: 115/56 (02-06-24 @ 19:35) (110/55 - 115/56)  RR: 19 (02-06-24 @ 19:35) (18 - 19)  SpO2: 97% (02-06-24 @ 19:35) (97% - 100%)    PHYSICAL EXAM  GENERAL: NAD,  HEAD:  NCAT, EOMI, MM  NECK: Supple, Nontender  NERVOUS SYSTEM: Baseline Functionality   CHEST/LUNG: +bs b/l, No wheezing   HEART: +s1s2 RRR  ABDOMEN: soft, NT/ND  EXTREMITIES:  pp, no edema  SKIN: age related skin changes      CTH:   1.  Cerebral, cerebellar, and corpus callosal atrophy.  2.  Periventricular white matter hypodensities, nonspecific, differential diagnostic possibilities include ischemic change, gliosis or demyelination.    IMPRESSION  AMS  Etiology Uncertain  Hx CVA  Non Verbal abd Bed Bound at bedside   Bradycardia  Resolved   Aspiration  - No sign of infectious process  - f/u TSH, B12, Folate (Not sure if pt has hypothyroidism, but she was not on Levothyroxine even before last admission)  - c/w Dulcolax Supp until BM  - f/u rEEG  - f/u EP cs for tracee   - Speech and swallow: recommended NPO last admission   - Aspiration precaution.   - pt never wanted PEG, NGT  - Family opted for pleasure feeds  - Keep atropine at bedside     Hx CVA; Functional quadriplegia; Dysphagia; non-verbal  - Patient is bedbound  - off loading as possible    Hx  Constipation- C/w Bowel Regimen     Hypothyroidism???  - dx has been included in H&Ps, however pt not on Levothyroxine as per Sister  - f/u TSH      Hx Anxiety/Insomnia:  - Hold  Trazodone 50 HS  - c/w Home Gabapentin 100 QD    Seen on 02/06

## 2024-02-06 NOTE — H&P ADULT - ASSESSMENT
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 Sister stated that she found patient to be not responsive around 11am today. Called for EMS. Per EMS, when they arrived, patient was became alert and patients sister said she was more awake. Found to be Bradycardic    #AMS 2/2 symptomatic tracee vs   - No sign of infectious process  -         # Speech and swallow: recommended NPO.   Aspiration precaution.   pt never wanted PEG, NGT d/c'd as well  family wanted to try pleasure feeds, which pt can do, but takes a long time to feed a small amt of food      # History of CVA; Functional quadriplegia; Dysphagia; non-verbal  Patient is bedbound  off loading as possible    #Hypothyroidism    #DLP   72 year old Female with a PMHx of CVA (non-verbal and bedbound) with residual right sided weakness, hypothyroidism??, HLD bought to the ED with sister for being unresponsive. Sister stated that she found patient to be not responsive around 11am today. Called for EMS. Per EMS, when they arrived, patient was became alert and patients sister said she was more awake. Found to be Bradycardic    #AMS 2/2 symptomatic tracee vs Constipation vs Stroke vs hypothyroidism   - CTH:   1.  Cerebral, cerebellar, and corpus callosal atrophy.  2.  Periventricular white matter hypodensities, nonspecific, differential diagnostic possibilities include ischemic change, gliosis or demyelination.  - No sign of infectious process  - f/u TSH, B12, Folate (Not sure if pt has hypothyroidism, but she was not on Levothyroxine even before last admission)  - c/w Dulcolax Supp until BM  - f/u rEEG  - f/u EP cs for tracee   - Keep atropine at bedside     #Aspiration  - Speech and swallow: recommended NPO last admission   - Aspiration precaution.   - pt never wanted PEG, NGT  - Family opted for pleasure feeds, which pt can do, but takes a long time to feed a small amt of food    # History of CVA; Functional quadriplegia; Dysphagia; non-verbal  - Patient is bedbound  - off loading as possible    #Hypothyroidism???  - dx has been included in H&Ps, however pt not on Levothyroxine as per Sister  - f/u     #DLP    #Anxiety/Insomnia:  - c/w Home Trazodone 50 HS  - c/w Home Gabapentin 100 QD    #Misc:  - DVT ppx: LVX 40 QD  - GI PPX: -  - Diet: Puree aspiration precaution  - Activity/PT eval: Bedboud  - Code status: DNI/DNR  - Dispo: Tele

## 2024-02-07 DIAGNOSIS — Z86.73 PERSONAL HISTORY OF TRANSIENT ISCHEMIC ATTACK (TIA), AND CEREBRAL INFARCTION WITHOUT RESIDUAL DEFICITS: ICD-10-CM

## 2024-02-07 DIAGNOSIS — R41.82 ALTERED MENTAL STATUS, UNSPECIFIED: ICD-10-CM

## 2024-02-07 DIAGNOSIS — Z51.5 ENCOUNTER FOR PALLIATIVE CARE: ICD-10-CM

## 2024-02-07 DIAGNOSIS — Z91.89 OTHER SPECIFIED PERSONAL RISK FACTORS, NOT ELSEWHERE CLASSIFIED: ICD-10-CM

## 2024-02-07 LAB
HCV AB S/CO SERPL IA: 0.04 COI — SIGNIFICANT CHANGE UP
HCV AB SERPL-IMP: SIGNIFICANT CHANGE UP

## 2024-02-07 PROCEDURE — 99222 1ST HOSP IP/OBS MODERATE 55: CPT

## 2024-02-07 PROCEDURE — 70551 MRI BRAIN STEM W/O DYE: CPT | Mod: 26

## 2024-02-07 PROCEDURE — 99223 1ST HOSP IP/OBS HIGH 75: CPT

## 2024-02-07 PROCEDURE — 99497 ADVNCD CARE PLAN 30 MIN: CPT | Mod: 25

## 2024-02-07 PROCEDURE — 99233 SBSQ HOSP IP/OBS HIGH 50: CPT

## 2024-02-07 PROCEDURE — 95816 EEG AWAKE AND DROWSY: CPT | Mod: 26

## 2024-02-07 RX ORDER — ATORVASTATIN CALCIUM 80 MG/1
40 TABLET, FILM COATED ORAL AT BEDTIME
Refills: 0 | Status: DISCONTINUED | OUTPATIENT
Start: 2024-02-07 | End: 2024-02-09

## 2024-02-07 RX ORDER — ATROPINE SULFATE 0.1 MG/ML
1 SYRINGE (ML) INJECTION ONCE
Refills: 0 | Status: DISCONTINUED | OUTPATIENT
Start: 2024-02-07 | End: 2024-02-09

## 2024-02-07 RX ADMIN — ATORVASTATIN CALCIUM 40 MILLIGRAM(S): 80 TABLET, FILM COATED ORAL at 22:27

## 2024-02-07 RX ADMIN — GABAPENTIN 100 MILLIGRAM(S): 400 CAPSULE ORAL at 22:27

## 2024-02-07 RX ADMIN — Medication 10 MILLIGRAM(S): at 11:53

## 2024-02-07 RX ADMIN — ENOXAPARIN SODIUM 40 MILLIGRAM(S): 100 INJECTION SUBCUTANEOUS at 22:27

## 2024-02-07 NOTE — ED ADULT NURSE REASSESSMENT NOTE - NS ED NURSE REASSESS COMMENT FT1
Received pt from ALICJA Snyder. Pt at baseline mental status,  opens eyes to verbal stimuli.  VSS, IV intact.  No acute distress noted at this time.

## 2024-02-07 NOTE — GOALS OF CARE CONVERSATION - ADVANCED CARE PLANNING - CONVERSATION DETAILS
Spoke with patient's sister and HCP Tasha Fields. Family states they do not want resuscitation in the event of cardiac arrest, and they do not want intubation. Filled MOLST form DNR/DNI and placed in chart.

## 2024-02-07 NOTE — CONSULT NOTE ADULT - ASSESSMENT
73yFemale with history of CVA who is nonverbal at baseline, hypothyroidism, HLD brought in as unresponsive.  Patient had recently been on hospice but was decertified due to clinical improvement.  Patient admitted after being found unresponsive and was found to have AMS and bradycardia. Palliative care consulted for GOC.    Spoke with sister Tasha at bedside. Palliative care introduced. She was able to provide a medical history and hospital course. She noted the patient had been on hospice until recently and she was discharged from hospice due to improvement in clinical status. She believes the patient may have aspirated causing her symptoms. She is interested in workup for AMS now, but would like to have the patient return home on hospice if she is eligible. We discussed that I would speak with hospice and likely place a hospice consult if appropriate, and we could discuss CMO further after that.  DNR/DNI confirmed. All questions answered.      MEDD (morphine equivalent daily dose):    Education about palliative care provided to patient/family.  See Recs below.    Please call [phone] with questions or concerns 24/7.   We will continue to follow.

## 2024-02-07 NOTE — CONSULT NOTE ADULT - CONVERSATION DETAILS
Spoke with sister Tasha at bedside. Palliative care introduced. She was able to provide a medical history and hospital course. She noted the patient had been on hospice until recently and she was discharged from hospice due to improvement in clinical status. She believes the patient may have aspirated causing her symptoms. She is interested in workup for AMS now, but would like to have the patient return home on hospice if she is eligible. We discussed that I would speak with hospice and likely place a hospice consult if appropriate, and we could discuss CMO further after that.  DNR/DNI confirmed. All questions answered.

## 2024-02-07 NOTE — PATIENT PROFILE ADULT - HAS THE PATIENT RECEIVED THE INFLUENZA VACCINE THIS SEASON?
"Duration of visit: 2:15-3 (Pt arrived late)  Persons present: MOP and Pt     Mental Status: Pt oriented x5     Behavioral Observations: Pt was observed to hide behind the chairs   Pt's affect is \"hit or miss\" MOP says - he was very serious in the appt     Presenting Concerns/Referral Question: Question of ASD - Pt does not handle transitions well, does not like crowded spaces    Current living arrangement: MOP, FOP   Brother and sister - ages 4 and 7 months   Have a dog at home - she will let him lay on her     Current custody arrangement: NA     Recent stressors/changes: baby (7 months) - MOP reports they have not seen a change in him though since the baby     DEVELOPMENTAL:  Pregnancy: MOP reports no complications   14 minute labor     Delivery: born at 37 weeks   Vaginal     Post-delivery: MOP reports he had slight jaundice     Temperament as an infant: MOP reports he was a happy baby   Eye contact has never been great - MOP reports that he did sometimes briefly smile at them   Did not respond to his name   Obsessed with ceiling fans as an infant - wanted them on   Did blank stare     Any eating/sleeping difficulties:  for a while - MOP reports she wasn't producing enough (3.5 months)   Then switched to formula and he wouldn't do breast anymore   Foods introduced at 6 months - determined then that he had an egg allergy (MOP reports now they can put it in cake and he does ok)  Slept fine as a baby     DEVELOPMENTAL   MOP reports that he wasn't rolling over   Crawled at 9 months but more an army crawl   Walking at 12 months     Speech - delayed     Toilet training - fully diapers at this time   MOP reports he seems to know that people go potty and knows to wipe but refuses to sit down and do it      PLAY  MOP reports he doesn't understand how to pick things up properly   Were trying to teach him how to build blocks but Pt doesn't seem to understand   Does line up cars - has a favorite toy he cruises around " "all day     CURRENT CONCERNS:   Strengths: MOP reports they have been working consistently with him on motor skills and they are seeing progress   Strong willed - wants things his way     Eating habits of client: will eat anything     Sleeping patterns of client: goes to bed between 8:30-9:30, sleeps all night, wakes up around 7:30   Naps are hit or miss depending on how his day is going     CURRENT SYMPTOMS:  COMMUNICATION   MOP reports that he has about 6 words   Some babbling   No phrases   MOP reports that he will lead MOP to something or will bring something to her   Will point to things   Will grab the adult to guide them   Does some gestures   Can say \"leave me alone\"     MELTDOWNS   Bites self when frustrated/upset   All day long MOP reports that these will occur   Sometime just comes out of the room screaming   MOP reports that they can last all day - say he \"woke up on the wrong side of the bed\" - he wakes up and starts crying   Headbangs when frustrated     AGGRESSION   Will pull sisters hair, hit her   Will walk into her room and pull her hair   Sometimes also because she will want to play with him and he would rather be left alone     SENSORY   MOP reports they tried to introduce slime - wants to play with it but can't handle the texture on his hands   This will make him upset   Doesn't like texture of bubble baths   Haircuts are difficult   Bites his nails and toenails   Does not like his ears being touched   Does not like shoes or socks     RRBs  Pt was observed to stick his tongue out repetitively - MOP reports he was grabbing it and pinching it   Will engage in a hand flap when excited   Pt will get stuck on something and just returns to it over and over     MOTOR   MOP reports he is clumsy     NONVERBALS  Pt was observed to have very dramatic nonverbal expressions     SAFETY   Does know to hold hands   Does climb on things   Last year climbed out of crib and cracked head open     TRAUMA " "HISTORY:  None reported     FAMILY HISTORY   family history includes Heart Disease in his maternal grandmother; Hypertension in his maternal grandmother; Kidney Disease in his maternal grandfather.      SERVICE HISTORY:   Outpatient Treatment: none  Inpatient Treatment: none  Ancillary Services: none   Hospitalizations or Surgeries: No past surgical history on file.    Allergies: Eggs    NEUROLOGICAL   Fell and lacerated forehead November 2021 - no concussion   Does regularly bang his head when frustrated   No seizures     MEDICAL   No issues     Current/Past Medications:   Current Outpatient Medications   Medication Sig Dispense Refill   • EPINEPHrine 0.15 MG/0.15ML Solution Auto-injector Inject 0.15 mL as directed 1 time a day as needed (anaphylaxis). 1 Each 1     No current facility-administered medications for this visit.     No legal issues     SUBSTANCE USE HISTORY:  None    SOCIAL HISTORY:  Now responds to his name   Now makes some eye contact     MOP reports he seems to prefer to play by himself   Parents really have to initiate the play - they cannot interrupt his playing by himself     Will not interact with cousins his age   Family functions will get overwhelmed and hide   Will hide under his bed when people come over     If they take him to a playground and there are other kids he will sit there and watch but he will not play with them - he requires the section to be empty and then he will go play     EDUCATIONAL HISTORY   MOP reports he previously went to  - MOP says it was \"terrible\"  Would hide under a table, if he had to interact would scream or cry   Seemed super overstimulated     Working on getting speech started for Pt     CULTURAL CONSIDERATIONS:   None     PLAN   [facility] can do   [place]   " unable to assess immunization status...

## 2024-02-07 NOTE — CONSULT NOTE ADULT - ASSESSMENT
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 Patient just got out of Hospice. Sister stated that she found patient to be not responsive around 11am today. Called for EMS. Per EMS, when they arrived, patient was became alert and patients sister said she was more awake. However she was bradycardic in the field.    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 Patient just got out of Hospice. Sister stated that she found patient to be not responsive around 11am today. Called for EMS. Per EMS, when they arrived, patient was became alert and patients sister said she was more awake. However she was bradycardic in the field.    Impression:  Sinus Bradycardia  Hx CVA, nonverbal/bedbound  Hypothyroidism  HLD    Plan:  - No indication for PPM at this time  - Avoid AVN blocking agents  - Monitor electrolytes, maintain WNL  - Check TSH, free T4  - 2D Echo  - Recall EP 4272 if any pauses/worsening bradycardia

## 2024-02-07 NOTE — CONSULT NOTE ADULT - SUBJECTIVE AND OBJECTIVE BOX
Neurology Consult Note    HPI: 72 year old Female with a PMHx of stroke (non-verbal and bedbound baseline) with residual right sided weakness, hypothyroidism, HLD, brought to the ED with sister due to sudden unresponsiveness upon awakening yesterday morning. Sister states the pt was totally fine the night before, woke her up, and she was not opening her eyes or following commands. Sister called EMS, pt was bradycardic in route to hospital. Pt became more alert and awake upon arrival to hospital, vitals WNLs upon admission. CTH obtained by primary team showing Cerebral, cerebellar, and corpus callosal atrophy. Periventricular white matter hypodensities, nonspecific, differential diagnostic possibilities include ischemic change, gliosis or demyelination.  Neurology consulted due to findings. At time of consult, patients family states that she is currently back to baseline, will open her eyes and track them, move LUE slightly, and articulate sounds to communicate that she is hungry.       PAST MEDICAL & SURGICAL HISTORY:  Stroke      Hypothyroidism      Hypercholesteremia          FAMILY HISTORY:      SOCIAL HISTORY:  Denies smoking, drinking, or drug use    ROS: as per HPI     MEDICATIONS  (STANDING):  atorvastatin 40 milliGRAM(s) Oral at bedtime  atropine Injectable 1 milliGRAM(s) IV Push once  bisacodyl Suppository 10 milliGRAM(s) Rectal once  bisacodyl Suppository 10 milliGRAM(s) Rectal daily  enoxaparin Injectable 40 milliGRAM(s) SubCutaneous every 24 hours  gabapentin 100 milliGRAM(s) Oral at bedtime  polyethylene glycol 3350 17 Gram(s) Oral daily    MEDICATIONS  (PRN):  acetaminophen     Tablet .. 650 milliGRAM(s) Oral every 6 hours PRN Mild Pain (1 - 3)      Allergies    No Known Allergies    Intolerances        Vital Signs Last 24 Hrs  T(C): 36.3 (07 Feb 2024 14:11), Max: 36.9 (07 Feb 2024 06:22)  T(F): 97.3 (07 Feb 2024 14:11), Max: 98.4 (07 Feb 2024 06:22)  HR: 83 (07 Feb 2024 14:11) (50 - 83)  BP: 108/51 (07 Feb 2024 14:11) (92/54 - 126/60)  BP(mean): 77 (07 Feb 2024 09:35) (70 - 84)  RR: 20 (07 Feb 2024 14:11) (18 - 20)  SpO2: 100% (07 Feb 2024 14:11) (97% - 100%)    Parameters below as of 07 Feb 2024 14:11  Patient On (Oxygen Delivery Method): room air        Physical exam:  General: No acute distress, aroused by physical stimuli   Neurologic:  -Mental status: Awake, alert, to physical stimuli. Non-verbal at baseline.    -Cranial nerves:   III, IV, VI: Pupils equally round and reactive to light. left gaze preference noted (baseline)  VII: left facial droop, baseline.   Motor: Normal bulk and rigid tone. Extremities contracted at baseline. Able to move LUE fingers,  strength, 4/5.   Sensation: Will withdraw from pain in LUE/LLE.   Reflexes: Downgoing toes bilaterally         LABS:                        13.2   5.13  )-----------( 228      ( 06 Feb 2024 13:03 )             40.8     02-06    141  |  103  |  17  ----------------------------<  97  4.7   |  28  |  0.5<L>    Ca    9.3      06 Feb 2024 13:03    TPro  6.5  /  Alb  4.0  /  TBili  0.7  /  DBili  x   /  AST  15  /  ALT  11  /  AlkPhos  80  02-06      Urinalysis Basic - ( 06 Feb 2024 13:03 )    Color: x / Appearance: x / SG: x / pH: x  Gluc: 97 mg/dL / Ketone: x  / Bili: x / Urobili: x   Blood: x / Protein: x / Nitrite: x   Leuk Esterase: x / RBC: x / WBC x   Sq Epi: x / Non Sq Epi: x / Bacteria: x        RADIOLOGY & ADDITIONAL TESTS:  < from: CT Head No Cont (02.06.24 @ 13:34) >  IMPRESSION:    1.  Cerebral, cerebellar, and corpus callosal atrophy.    2.  Periventricular white matter hypodensities, nonspecific, differential   diagnostic possibilities include ischemic change, gliosis or   demyelination.    < end of copied text >            
Patient is a 73y old  Female who presents with a chief complaint of bradycardia    HPI: Patient is a 73 year old Female with a PMHx of CVA (non-verbal and bedbound) with residual right sided weakness, hypothyroidism, HLD bought to the ED with sister for being unresponsive. Patient just got out of Hospice. Sister stated that she found patient to be not responsive around 11am today. Called for EMS. Per EMS, when they arrived, patient was became alert and patients sister said she was more awake. However she was bradycardic in the field. Sister and niece at bedside states no intubation and no CPR. However they are agreeable with TVP and medications.    PAST MEDICAL & SURGICAL HISTORY:  Stroke      Hypothyroidism      Hypercholesteremia    PREVIOUS DIAGNOSTIC TESTING:      ECHO  FINDINGS:    STRESS  FINDINGS:    CATHETERIZATION  FINDINGS:    ELECTROPHYSIOLOGY STUDY  FINDINGS:    CAROTID ULTRASOUND:  FINDINGS    VENOUS DUPLEX SCAN:  FINDINGS:    CHEST CT PULMONARY ANGIO with IV Contrast:  FINDINGS:    MEDICATIONS  (STANDING):  bisacodyl Suppository 10 milliGRAM(s) Rectal once  bisacodyl Suppository 10 milliGRAM(s) Rectal daily  enoxaparin Injectable 40 milliGRAM(s) SubCutaneous every 24 hours  gabapentin 100 milliGRAM(s) Oral at bedtime  polyethylene glycol 3350 17 Gram(s) Oral daily    MEDICATIONS  (PRN):  acetaminophen     Tablet .. 650 milliGRAM(s) Oral every 6 hours PRN Mild Pain (1 - 3)      FAMILY HISTORY: No significant hx    SOCIAL HISTORY: No smoking, ETOH or illicit drug use    Past Surgical History: No significant hx    Allergies:  No Known Allergies      REVIEW OF SYSTEMS:  CONSTITUTIONAL: No fever, weight loss, chills, shakes, or fatigue  RESPIRATORY: No cough, wheezing, hemoptysis, or shortness of breath  CARDIOVASCULAR: No chest pain, dyspnea, palpitations, dizziness, syncope, paroxysmal nocturnal dyspnea, orthopnea, or arm or leg swelling  GASTROINTESTINAL: No abdominal  or epigastric pain, nausea, vomiting, hematemesis, diarrhea, constipation, melena or bright red blood.  NEUROLOGICAL: No headaches, memory loss, slurred speech, limb weakness, loss of strength, numbness, or tremors  MUSCULOSKELETAL: No joint pain or swelling, muscle, back, or extremity pain      Vital Signs Last 24 Hrs  T(C): 36.1 (07 Feb 2024 09:35), Max: 36.9 (07 Feb 2024 06:22)  T(F): 97 (07 Feb 2024 09:35), Max: 98.4 (07 Feb 2024 06:22)  HR: 56 (07 Feb 2024 09:35) (44 - 63)  BP: 111/53 (07 Feb 2024 09:35) (92/54 - 126/60)  BP(mean): 77 (07 Feb 2024 09:35) (70 - 84)  RR: 20 (07 Feb 2024 09:35) (18 - 20)  SpO2: 100% (07 Feb 2024 09:35) (97% - 100%)    Parameters below as of 07 Feb 2024 09:35  Patient On (Oxygen Delivery Method): room air        PHYSICAL EXAM:  GENERAL: In no apparent distress, well nourished, and hydrated.  NECK: Supple, No JVD   HEART: Regular rate and rhythm; No murmurs, rubs, or gallops.  PULMONARY: Clear to auscultation and perfusion.  No rales, wheezing, or rhonchi bilaterally.  EXTREMITIES:  2+ Peripheral Pulses, no LE edema BL  NEUROLOGICAL: Responds to verbal stimuli; Nonverbal, R side deficits      INTERPRETATION OF TELEMETRY: SR 57 bpm    ECG:  < from: 12 Lead ECG (02.06.24 @ 12:30) >  Ventricular Rate 43 BPM    Atrial Rate 43 BPM    P-R Interval 168 ms    QRS Duration 74 ms    Q-T Interval 486 ms    QTC Calculation(Bazett) 410 ms    P Axis 79 degrees    R Axis 72 degrees    T Axis 67 degrees    Diagnosis Line Marked sinus bradycardia  Abnormal ECG    Confirmed by Behuria, Supreeti ([id number]) on 2/6/2024 4:48:10 PM    < end of copied text >    I&O's Detail      LABS:                        13.2   5.13  )-----------( 228      ( 06 Feb 2024 13:03 )             40.8     02-06    141  |  103  |  17  ----------------------------<  97  4.7   |  28  |  0.5<L>    Ca    9.3      06 Feb 2024 13:03    TPro  6.5  /  Alb  4.0  /  TBili  0.7  /  DBili  x   /  AST  15  /  ALT  11  /  AlkPhos  80  02-06          Urinalysis Basic - ( 06 Feb 2024 13:03 )    Color: x / Appearance: x / SG: x / pH: x  Gluc: 97 mg/dL / Ketone: x  / Bili: x / Urobili: x   Blood: x / Protein: x / Nitrite: x   Leuk Esterase: x / RBC: x / WBC x   Sq Epi: x / Non Sq Epi: x / Bacteria: x      BNP  I&O's Detail    Daily     Daily     RADIOLOGY & ADDITIONAL STUDIES:
CC: unresponsive     HPI:  72 year old Female with a PMHx of CVA (non-verbal and bedbound) with residual right sided weakness, hypothyroidism, HLD bought to the ED with sister for being unresponsive. Patient just got out of Hospice. Sister stated that she found patient to be not responsive around 11am today. Called for EMS. Per EMS, when they arrived, patient was became alert and patients sister said she was more awake. However she was bradycardic in the field. Sister and niece at bedside states no intubation and no CPR. However they are agreeable with TVP and medications.    As per sister: Baseline is non verbal, bedbound, opens her eyes to verbal stimuli, follows simple command (ex: lift your left arm if you want ..), eats pureed food although she aspirates as family opted for comfort feeding as refused PEG tube and NGT in the past)     In the ED: BP: 110/5, HR:44, RR" 18, SpO2: 100 on RA    EKG Sinus stewart    Labs: CBC and CMP wnl  Trop -> 29-> 31    CTH:   1.  Cerebral, cerebellar, and corpus callosal atrophy.  2.  Periventricular white matter hypodensities, nonspecific, differential diagnostic possibilities include ischemic change, gliosis or demyelination.    CXR:  No radiographic evidence of acute cardiopulmonary disease.    s/p 500 Bolus and SoluCortef 100 once (06 Feb 2024 20:29)    PERTINENT PM/SXH:   Stroke    Hypothyroidism    Hypercholesteremia        FAMILY HISTORY:  None pertinent     ITEMS NOT CHECKED ARE NOT PRESENT    SOCIAL HISTORY:   Significant other/partner[ ]  Children[ ]  Amish/Spirituality:  Substance hx:  [ ]   Tobacco hx:  [ ]   Alcohol hx: [ ]   Living Situation: [ x]Home  [ ]Long term care  [ ]Rehab [ ]Other  Home Services: [ ] HHA [ ] Visting RN [ ] Hospice  Occupation:  Home Opioid hx:  [ ] Y [ ] N [ x] I-Stop Reference No:    Reference #: [id number]    Practitioner Count: 1  Pharmacy Count: 1  Current Opioid Prescriptions: 0  Current Benzodiazepine Prescriptions: 1  Current Stimulant Prescriptions: 0      Patient Demographic Information (PDI)       PDI	First Name	Last Name	Birth Date	Gender	Street Address	[facility]	Zip Code  A	Allyn Laird	1950	Female	[address]	[facility]	[id number]  B	Allyn Laird	1950	Female	[address]	[facility]	[id number]    Prescription Information      PDI Filter:    PDI	Current Rx	Drug Type	Rx Written	Rx Dispensed	Drug	Quantity	Days Supply	Prescriber Name	Prescriber JESSICA #	Payment Method	Dispenser  A	Y	B	01/30/2024	02/04/2024	lorazepam 0.5 mg tablet	90	30	Adrianna Horton	[id number]	Medicare	[id number]  B	N	O	08/25/2023	08/26/2023	morphine sulf 100 mg/5 ml conc	30ml	10	DonyRylie zhao FN	[id number]	Cash	[facility]  B	N	B	08/25/2023	08/26/2023	lorazepam 0.5 mg tablet	10	2	DonySusya GINA AN	[id number]	Cash	[facility]     ADVANCE DIRECTIVES:     [ ] Full Code [ x] DNR  MOLST  [ ]  Living Will  [ ]   DECISION MAKER(s):  [ ] Health Care Proxy(s)  [ ] Surrogate(s)  [ ] Guardian           Name(s): Phone Number(s):      BASELINE (I)ADL(s) (prior to admission):    [place]: [ ]Total  [ ] Moderate [ ]Dependent  Palliative Performance Status Version 2:         %    http://npcrc.org/files/news/palliative_performance_scale_ppsv2.pdf    Allergies    No Known Allergies    Intolerances    MEDICATIONS  (STANDING):  atorvastatin 40 milliGRAM(s) Oral at bedtime  atropine Injectable 1 milliGRAM(s) IV Push once  bisacodyl Suppository 10 milliGRAM(s) Rectal daily  bisacodyl Suppository 10 milliGRAM(s) Rectal once  enoxaparin Injectable 40 milliGRAM(s) SubCutaneous every 24 hours  gabapentin 100 milliGRAM(s) Oral at bedtime  polyethylene glycol 3350 17 Gram(s) Oral daily    MEDICATIONS  (PRN):  acetaminophen     Tablet .. 650 milliGRAM(s) Oral every 6 hours PRN Mild Pain (1 - 3)    PRESENT SYMPTOMS: [x]Unable to obtain due to poor mentation   Source if other than patient:  [ ]Family   [ ]Team     Pain: [ ]yes [ ]no  QOL impact -   Location -                    Aggravating factors -  Quality -  Radiation -  Timing-  Severity (0-10 scale):  Minimal acceptable level (0-10 scale):     CPOT:  1  https://www.sccm.org/getattachment/wis52s57-8k8k-3f5d-3y6k-7618b2373t4v/Critical-Care-Pain-Observation-Tool-(CPOT)    PAIN AD Score:   http://geriatrictoolkit.[facility]/cog/painad.pdf (press ctrl +  left click to view)    Dyspnea:                           [ ]None[ ]Mild [ ]Moderate [ ]Severe     Respiratory Distress Observation Scale (RDOS): 1  A score of 0 to 2 signifies little or no respiratory distress, 3 signifies mild distress, scores 4 to 6 indicate moderate distress, and scores greater than 7 signify severe distress  https://www.[facility].ca/sites/default/files/PDFS/467542-egucaagkbra-dcvxvmaw-mgidqdqscsw-gxpmw.pdf    Anxiety:                             [ ]None[ ]Mild [ ]Moderate [ ]Severe   Fatigue:                             [ ]None[ ]Mild [ ]Moderate [ ]Severe   Nausea:                             [ ]None[ ]Mild [ ]Moderate [ ]Severe   Loss of appetite:              [ ]None[ ]Mild [ ]Moderate [ ]Severe   Constipation:                    [ ]None[ ]Mild [ ]Moderate [ ]Severe    Other Symptoms:  [ ]All other review of systems negative     Palliative Performance Status Version 2:         20%    http://npcrc.org/files/news/palliative_performance_scale_ppsv2.pdf    PHYSICAL EXAM:  Vital Signs Last 24 Hrs  T(C): 36.3 (07 Feb 2024 14:11), Max: 36.9 (07 Feb 2024 06:22)  T(F): 97.3 (07 Feb 2024 14:11), Max: 98.4 (07 Feb 2024 06:22)  HR: 83 (07 Feb 2024 14:11) (50 - 83)  BP: 108/51 (07 Feb 2024 14:11) (92/54 - 126/60)  BP(mean): 77 (07 Feb 2024 09:35) (70 - 84)  RR: 20 (07 Feb 2024 14:11) (18 - 20)  SpO2: 100% (07 Feb 2024 14:11) (97% - 100%)    Parameters below as of 07 Feb 2024 14:11  Patient On (Oxygen Delivery Method): room air     I&O's Summary    07 Feb 2024 07:01  -  07 Feb 2024 16:07  --------------------------------------------------------  IN: 260 mL / OUT: 0 mL / NET: 260 mL        GENERAL:  [ ] No acute distress [ ]Lethargic  [ ]Unarousable  [ ]Verbal  [x ]Non-Verbal [ ]Cachexia    BEHAVIORAL/PSYCH:  [ ]Alert and Oriented x  [ ] Anxiety [ ] Delirium [ ] Agitation [ x] Calm   EYES: [x ] No scleral icterus [ ] Scleral icterus [ ] Closed  ENMT:  [ ]Dry mouth  [x ]No external oral lesions [ ] No external ear or nose lesions  CARDIOVASCULAR:  [ ]Regular [ ]Irregular [ ]Tachy [ x]Not Tachy  [ ]Stewart [ ] Edema [ ] No edema  PULMONARY:  [ ]Tachypnea  [ ]Audible excessive secretions [ x] No labored breathing [ ] labored breathing  GASTROINTESTINAL: [ ]Soft  [ ]Distended  [ ]Not distended [ ]Non tender [ ]Tender  MUSCULOSKELETAL: [ ]No clubbing [ ] clubbing  [x ] No cyanosis [ ] cyanosis  NEUROLOGIC: [ ]No focal deficits  [ ]Follows commands  [x ]Does not follow commands  [ ]Cognitive impairment  [ ]Dysphagia  [ ]Dysarthria  [ ]Paresis   SKIN: [ ] Jaundiced [ x] Non-jaundiced [ ]Rash [ ]No Rash [ ] Warm [ ] Dry  MISC/LINES: [ ] ET tube [ ] Trach [ ]NGT/OGT [ ]PEG [ ]Pineda    LABS: reviewed by me                        13.2   5.13  )-----------( 228      ( 06 Feb 2024 13:03 )             40.8   02-06    141  |  103  |  17  ----------------------------<  97  4.7   |  28  |  0.5<L>    Ca    9.3      06 Feb 2024 13:03    TPro  6.5  /  Alb  4.0  /  TBili  0.7  /  DBili  x   /  AST  15  /  ALT  11  /  AlkPhos  80  02-06      Urinalysis Basic - ( 06 Feb 2024 13:03 )    Color: x / Appearance: x / SG: x / pH: x  Gluc: 97 mg/dL / Ketone: x  / Bili: x / Urobili: x   Blood: x / Protein: x / Nitrite: x   Leuk Esterase: x / RBC: x / WBC x   Sq Epi: x / Non Sq Epi: x / Bacteria: x      RADIOLOGY & ADDITIONAL STUDIES: reviewed by me    < from: Xray Chest 1 View AP/PA (02.06.24 @ 14:11) >  Impression:    No radiographic evidence of acute cardiopulmonary disease.    < end of copied text >      EKG: reviewed by me    < from: 12 Lead ECG (02.06.24 @ 12:30) >  Ventricular Rate 43 BPM    Atrial Rate 43 BPM    P-R Interval 168 ms    QRS Duration 74 ms    Q-T Interval 486 ms    QTC Calculation(Bazett) 410 ms    P Axis 79 degrees    R Axis 72 degrees    T Axis 67 degrees    Diagnosis Line Marked sinus bradycardia  Abnormal ECG    < end of copied text >      PROTEIN CALORIE MALNUTRITION PRESENT: [ ]mild [ ]moderate [ ]severe [ ]underweight [ ]morbid obesity  https://www.andeal.org/vault/2440/web/files/ONC/Table_Clinical%20Characteristics%20to%20Document%20Malnutrition-White%20JV%20et%20al%362537.pdf    Height (cm): 149.9 (08-15-23 @ 10:18)  Weight (kg): 53.7 (02-07-24 @ 10:19), 59 (08-14-23 @ 13:18)  BMI (kg/m2): 23.9 (02-07-24 @ 10:19), 26.3 (08-15-23 @ 10:18)  [ ]PPSV2 < or = to 30% [ ]significant weight loss  [ ]poor nutritional intake  [ ]anasarca      [ ]Artificial Nutrition      Palliative Care Spiritual/Emotional Screening Tool Question  Severity (0-4):                    OR                    [ x] Unable to determine. Will assess at later time if appropriate.  Score of 2 or greater indicates recommendation of Chaplaincy and/or SW referral  Chaplaincy Referral: [ ] Yes [ ] Refused [ ] Following     Caregiver [place]and:  [ ] Yes [ ] No    OR    [x ] Unable to determine. Will assess at later time if appropriate.  Social Work Referral [ ]  Patient and Family Centered Care Referral [ ]    Anticipatory Grief Present: [ ] Yes [ ] No    OR     [ x] Unable to determine. Will assess at later time if appropriate.  Social Work Referral [ ]  Patient and Family Centered Care Referral [ ]    Patient discussed with primary medical team MD  Palliative care education provided to patient and/or family

## 2024-02-07 NOTE — PATIENT PROFILE ADULT - FALL HARM RISK - HARM RISK INTERVENTIONS
Assistance with ambulation/Assistance OOB with selected safe patient handling equipment/Communicate Risk of Fall with Harm to all staff/Discuss with provider need for PT consult/Monitor gait and stability/Provide patient with walking aids - walker, cane, crutches/Reinforce activity limits and safety measures with patient and family/Review medications for side effects contributing to fall risk/Sit up slowly, dangle for a short time, stand at bedside before walking/Tailored Fall Risk Interventions/Toileting schedule using arm’s reach rule for commode and bathroom/Visual Cue: Yellow wristband and red socks/Bed in lowest position, wheels locked, appropriate side rails in place/Call bell, personal items and telephone in reach/Instruct patient to call for assistance before getting out of bed or chair/Non-slip footwear when patient is out of bed/[facility] to call system/Physically safe environment - no spills, clutter or unnecessary equipment/Purposeful Proactive Rounding/Room/bathroom lighting operational, light cord in reach

## 2024-02-07 NOTE — CONSULT NOTE ADULT - PROBLEM SELECTOR RECOMMENDATION 9
NOnverbal at baseline. Patient unresponsive at home, improved here. Possible 2/2 bradycardia vs aspiration vs constipation other neurological causes  -follow up workup, EEG, neuro, cardiology  -treatment of constipation    Recommend non-pharmacological interventions to prevent/treat delirium  - maintain day/night light cycles  - optimize sleep-wake cycle, minimize environmental noise  - reorientation frequently  - use verbal redirection as first line  - minimize restraints and lines  - ensure good bladder/bowel function  - ensure adequate pain control  - minimize use of anticholinergic, antihistaminic, and benzodiazepine medications

## 2024-02-07 NOTE — CONSULT NOTE ADULT - NS ATTEND AMEND GEN_ALL_CORE FT
complex patient  no indication for pacemaker at this time
MRI head wo:   IMPRESSION:  1.  Study isdegraded by motion significantly    2.  There is no definite evidence for restricted diffusion to suggest   recent infarction    3.  Enlarged ventricles relative to sulci, correlate.    EEG:   Impression:  Abnormal due to generalized slowing as above  Clinical Correlation:  Findings consistent with diffuse electrocerebral dysfunction secondary to nonspecific etiology    Patient is back to baseline.   TSH wnl.   B12, RPR pending.

## 2024-02-07 NOTE — CONSULT NOTE ADULT - ASSESSMENT
Assessment and Plan  72 year old Female with a PMHx of stroke (non-verbal and bedbound baseline) with residual right sided weakness, hypothyroidism, HLD, brought to the ED with sister due to sudden unresponsiveness upon awakening yesterday morning. Sister states the pt was totally fine the night before, woke her up, and she was not opening her eyes or following commands. Sister called EMS, pt was bradycardic in route to hospital. Pt became more alert and awake upon arrival to hospital, vitals WNLs upon admission. CTH obtained by primary team showing Cerebral, cerebellar, and corpus callosal atrophy. Periventricular white matter hypodensities, nonspecific, differential diagnostic possibilities include ischemic change, gliosis or demyelination.  Neurology consulted due to findings. At time of consult, patients family states that she is currently back to baseline. Pt was able to open her eyes, gaze noted, does not cross midline, baseline, rigid tone. Extremities contracted at baseline. Able to move LUE fingers,  strength, 4/5. Will withdraw from pain in LUE/LLE.     Impression: 73 y/o F with baseline deficits and period of unresponsiveness yesterday morning with bradycardic vitals in EMS, however currently back to baseline, CTH findings as above. Current CTH findings and state of patient do not likely reflect that of acute ischemic pathology, can be due to toxic metabolic/infectious process.       RECS:  -No need for MRI non con  -toxic metabolic/infectious workup/UA   -Contact neurology if any acute changes in mental status, otherwise no further neurological work up     Discussed with Dr. De Leon  Assessment and Plan  72 year old Female with a PMHx of stroke (non-verbal and bedbound baseline) with residual right sided weakness, hypothyroidism, HLD, brought to the ED with sister due to sudden unresponsiveness upon awakening yesterday morning. Sister states the pt was totally fine the night before, woke her up, and she was not opening her eyes or following commands. Sister called EMS, pt was bradycardic in route to hospital. Pt became more alert and awake upon arrival to hospital, vitals WNLs upon admission. CTH obtained by primary team showing Cerebral, cerebellar, and corpus callosal atrophy. Periventricular white matter hypodensities, nonspecific, differential diagnostic possibilities include ischemic change, gliosis or demyelination.  Neurology consulted due to findings. At time of consult, patients family states that she is currently back to baseline. Pt was able to open her eyes, gaze noted, does not cross midline, baseline, rigid tone. Extremities contracted at baseline. Able to move LUE fingers,  strength, 4/5. Will withdraw from pain in LUE/LLE.     Impression: 73 y/o F with baseline deficits and period of unresponsiveness yesterday morning with bradycardic vitals in EMS, however currently back to baseline, CTH findings as above. Current CTH findings and state of patient do not likely reflect that of acute ischemic pathology, can be due to toxic metabolic/infectious process.       RECS:  -MRI non con  -rEEG  -B12/Folate/TSH/RPR  -toxic metabolic/infectious workup/UA       Discussed with Dr. De Leon

## 2024-02-07 NOTE — EEG REPORT - NS EEG TEXT BOX
[facility] Department of Neurology  Inpatient Routine-EEG Report      Patient Name:	ANNA COBOS    :	1950  MRN:	[id number]  Study Date/Time:	2024, 5:09:18 AM  Referred by:	-    Brief Clinical History:  ANNA COBOS is a 73 year old Female; study performed to investigate for seizures or markers of epilepsy.   Diagnosis Code: R40.4 Transient alteration of awareness    Patient Medication:  lovenox    tylenol    dulcolax    neurontin    miralax      Acquisition Details:  Electroencephalography was acquired using a minimum of 21 channels on an [employer] system v 9.3.1 with electrode placement according to the standard International 10-20 system following ACNS (American Clinical Neurophysiology Society) guidelines.  Anterior temporal T1 and T2 electrodes were utilized whenever possible.   The [employer] automated spike & seizure detections were all reviewed in detail, in addition to the entire raw EEG.    Findings:  Background:  continuous.   Voltage:  Normal (20uV)  Organization:  Rudimentary  Posterior Dominant Rhythm:  <7 Hz symmetric, well-organized, and well-modulated  Variability:  Yes	Reactivity:  Yes  Sleep:  Absent.  Focal abnormalities:  No persistent asymmetries of voltage or frequency.  Interictal Activity:  None  Focal Slowing:  None  Generalized Slowing:  Moderate  Events:  1)	No electrographic seizures or significant clinical events.  Provocations:  1)	Hyperventilation: was not performed.  2)	Photic stimulation: was not performed.  Impression:  Abnormal due to generalized slowing as above    Clinical Correlation:  Findings consistent with diffuse electrocerebral dysfunction secondary to nonspecific etiology    Kiana Maynard MD  Attending Neurologist, Division of Epilepsy

## 2024-02-07 NOTE — CONSULT NOTE ADULT - PROBLEM SELECTOR RECOMMENDATION 2
Patient was NPO prior to hospice  -patient on pleasure feeds at home  -follow up S+S  -family likely interested in hospice on discharge

## 2024-02-08 LAB
ALBUMIN SERPL ELPH-MCNC: 4 G/DL — SIGNIFICANT CHANGE UP (ref 3.5–5.2)
ALP SERPL-CCNC: 77 U/L — SIGNIFICANT CHANGE UP (ref 30–115)
ALT FLD-CCNC: 10 U/L — SIGNIFICANT CHANGE UP (ref 0–41)
ANION GAP SERPL CALC-SCNC: 12 MMOL/L — SIGNIFICANT CHANGE UP (ref 7–14)
AST SERPL-CCNC: 16 U/L — SIGNIFICANT CHANGE UP (ref 0–41)
BASOPHILS # BLD AUTO: 0.03 K/UL — SIGNIFICANT CHANGE UP (ref 0–0.2)
BASOPHILS NFR BLD AUTO: 0.4 % — SIGNIFICANT CHANGE UP (ref 0–1)
BILIRUB SERPL-MCNC: 1 MG/DL — SIGNIFICANT CHANGE UP (ref 0.2–1.2)
BUN SERPL-MCNC: 24 MG/DL — HIGH (ref 10–20)
CALCIUM SERPL-MCNC: 9.4 MG/DL — SIGNIFICANT CHANGE UP (ref 8.4–10.5)
CHLORIDE SERPL-SCNC: 102 MMOL/L — SIGNIFICANT CHANGE UP (ref 98–110)
CO2 SERPL-SCNC: 24 MMOL/L — SIGNIFICANT CHANGE UP (ref 17–32)
CORTIS AM PEAK SERPL-MCNC: 15.9 UG/DL — SIGNIFICANT CHANGE UP (ref 6–18.4)
CREAT SERPL-MCNC: 0.5 MG/DL — LOW (ref 0.7–1.5)
EGFR: 98 ML/MIN/1.73M2 — SIGNIFICANT CHANGE UP
EOSINOPHIL # BLD AUTO: 0.06 K/UL — SIGNIFICANT CHANGE UP (ref 0–0.7)
EOSINOPHIL NFR BLD AUTO: 0.7 % — SIGNIFICANT CHANGE UP (ref 0–8)
GLUCOSE SERPL-MCNC: 104 MG/DL — HIGH (ref 70–99)
HCT VFR BLD CALC: 41 % — SIGNIFICANT CHANGE UP (ref 37–47)
HGB BLD-MCNC: 13.5 G/DL — SIGNIFICANT CHANGE UP (ref 12–16)
IMM GRANULOCYTES NFR BLD AUTO: 0.2 % — SIGNIFICANT CHANGE UP (ref 0.1–0.3)
LYMPHOCYTES # BLD AUTO: 1.38 K/UL — SIGNIFICANT CHANGE UP (ref 1.2–3.4)
LYMPHOCYTES # BLD AUTO: 16.9 % — LOW (ref 20.5–51.1)
MCHC RBC-ENTMCNC: 30.8 PG — SIGNIFICANT CHANGE UP (ref 27–31)
MCHC RBC-ENTMCNC: 32.9 G/DL — SIGNIFICANT CHANGE UP (ref 32–37)
MCV RBC AUTO: 93.6 FL — SIGNIFICANT CHANGE UP (ref 81–99)
MONOCYTES # BLD AUTO: 0.46 K/UL — SIGNIFICANT CHANGE UP (ref 0.1–0.6)
MONOCYTES NFR BLD AUTO: 5.6 % — SIGNIFICANT CHANGE UP (ref 1.7–9.3)
NEUTROPHILS # BLD AUTO: 6.23 K/UL — SIGNIFICANT CHANGE UP (ref 1.4–6.5)
NEUTROPHILS NFR BLD AUTO: 76.2 % — HIGH (ref 42.2–75.2)
NRBC # BLD: 0 /100 WBCS — SIGNIFICANT CHANGE UP (ref 0–0)
PLATELET # BLD AUTO: 201 K/UL — SIGNIFICANT CHANGE UP (ref 130–400)
PMV BLD: 10.1 FL — SIGNIFICANT CHANGE UP (ref 7.4–10.4)
POTASSIUM SERPL-MCNC: 4.2 MMOL/L — SIGNIFICANT CHANGE UP (ref 3.5–5)
POTASSIUM SERPL-SCNC: 4.2 MMOL/L — SIGNIFICANT CHANGE UP (ref 3.5–5)
PROT SERPL-MCNC: 6.4 G/DL — SIGNIFICANT CHANGE UP (ref 6–8)
RBC # BLD: 4.38 M/UL — SIGNIFICANT CHANGE UP (ref 4.2–5.4)
RBC # FLD: 12.4 % — SIGNIFICANT CHANGE UP (ref 11.5–14.5)
SODIUM SERPL-SCNC: 138 MMOL/L — SIGNIFICANT CHANGE UP (ref 135–146)
TSH SERPL-MCNC: 2.64 UIU/ML — SIGNIFICANT CHANGE UP (ref 0.27–4.2)
WBC # BLD: 8.18 K/UL — SIGNIFICANT CHANGE UP (ref 4.8–10.8)
WBC # FLD AUTO: 8.18 K/UL — SIGNIFICANT CHANGE UP (ref 4.8–10.8)

## 2024-02-08 PROCEDURE — 99233 SBSQ HOSP IP/OBS HIGH 50: CPT

## 2024-02-08 PROCEDURE — 99232 SBSQ HOSP IP/OBS MODERATE 35: CPT

## 2024-02-08 RX ADMIN — Medication 10 MILLIGRAM(S): at 11:41

## 2024-02-08 RX ADMIN — GABAPENTIN 100 MILLIGRAM(S): 400 CAPSULE ORAL at 22:05

## 2024-02-08 RX ADMIN — ATORVASTATIN CALCIUM 40 MILLIGRAM(S): 80 TABLET, FILM COATED ORAL at 22:05

## 2024-02-08 RX ADMIN — ENOXAPARIN SODIUM 40 MILLIGRAM(S): 100 INJECTION SUBCUTANEOUS at 22:05

## 2024-02-08 RX ADMIN — POLYETHYLENE GLYCOL 3350 17 GRAM(S): 17 POWDER, FOR SOLUTION ORAL at 11:41

## 2024-02-08 NOTE — PROGRESS NOTE ADULT - CONVERSATION DETAILS
Spoke with sister Tasha over the phone. Discussed that I spoke with hospice and Maria will likely be eligible again for hospice. She noted that she would like to pursue hospice for her again on discharge. We discussed the MRI was unrevealing, and if the goal is for hospice discharge in the next 1-2 days, we can likely discontinue additional aggressvie workup/tests. She expressed understanding and noted that she would speak with the primary team about the test she gets to the hospital.

## 2024-02-08 NOTE — ADVANCED PRACTICE NURSE CONSULT - ASSESSMENT
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 Patient just got out of Hospice. Sister stated that she found patient to be not responsive around 11am today. Called for EMS. Per EMS, when they arrived, patient was became alert and patients sister said she was more awake. However she was bradycardic in the field. Sister and niece at bedside states no intubation and no CPR. However they are agreeable with TVP and medications. As per sister: Baseline is non verbal, bedbound, opens her eyes to verbal stimuli, follows simple command (ex: lift your left arm if you want ..), eats pureed food although she aspirates as family opted for comfort feeding as refused PEG tube and NGT in the past)     Allergies and Intolerances:        Allergies:  	No Known Allergies:     Home Medications:   * Patient Currently Takes Medications as of 06-Feb-2024 21:36 documented in Structured Notes  · 	acetaminophen 650 mg oral tablet, extended release: Last Dose Taken:  , 1 tab(s) orally every 6 hours as needed for  mild pain  · 	bisacodyl 10 mg rectal suppository: Last Dose Taken:  , 1 suppository(ies) rectal once a day as needed for Constipation  · 	traZODone 50 mg oral tablet: orally once a day (at bedtime)  · 	guaiFENesin 100 mg oral tablet: Last Dose Taken:  , 100 milligram(s) orally once a day  · 	gabapentin 100 mg oral tablet: Last Dose Taken:  , 1 tab(s) orally once a day    Patient received lying in bed. Awake. Limited mobility. Incontinent of urine and stool.  High risk for pressure injury.    Dry intact hyperpigmentation to bilateral buttocks.     Skin to bilateral heels intact at time of assessment.

## 2024-02-08 NOTE — HOSPICE CARE NOTE - CONVESATION DETAILS
Patient’s sister Tasha requesting reinstatement of hospice services. Tasha aware that once hospice services are resumed we will not bring the patient back to the hospital for aggressive interventions. Tasha verbalized understanding and is requesting A D/C tomorrow afternoon with reinstatement of HHA services. Hospice to coordinate admission visit over the weekend.

## 2024-02-08 NOTE — ADVANCED PRACTICE NURSE CONSULT - RECOMMEDATIONS
Cleanse bilateral buttocks with soap and water.   Pat dry apply triad leave open to air twice a day and prn for soiling.   Maintain pressure injury prevention.   Keep skin clean.   Maintain incontinence care.   Monitor wound for changes and notify provider   Case discussed with primary RN and ANM

## 2024-02-09 ENCOUNTER — TRANSCRIPTION ENCOUNTER (OUTPATIENT)
Age: 74
End: 2024-02-09

## 2024-02-09 VITALS
DIASTOLIC BLOOD PRESSURE: 57 MMHG | RESPIRATION RATE: 18 BRPM | HEART RATE: 68 BPM | OXYGEN SATURATION: 95 % | SYSTOLIC BLOOD PRESSURE: 110 MMHG

## 2024-02-09 PROCEDURE — 99233 SBSQ HOSP IP/OBS HIGH 50: CPT

## 2024-02-09 PROCEDURE — 99239 HOSP IP/OBS DSCHRG MGMT >30: CPT

## 2024-02-09 PROCEDURE — 93306 TTE W/DOPPLER COMPLETE: CPT | Mod: 26

## 2024-02-09 RX ORDER — ATORVASTATIN CALCIUM 80 MG/1
1 TABLET, FILM COATED ORAL
Qty: 0 | Refills: 0 | DISCHARGE
Start: 2024-02-09

## 2024-02-09 RX ADMIN — Medication 10 MILLIGRAM(S): at 11:08

## 2024-02-09 RX ADMIN — POLYETHYLENE GLYCOL 3350 17 GRAM(S): 17 POWDER, FOR SOLUTION ORAL at 11:08

## 2024-02-09 NOTE — DISCHARGE NOTE PROVIDER - NSDCMRMEDTOKEN_GEN_ALL_CORE_FT
acetaminophen 650 mg oral tablet, extended release: 1 tab(s) orally every 6 hours as needed for  mild pain  atorvastatin 40 mg oral tablet: 1 tab(s) orally once a day (at bedtime)  bisacodyl 10 mg rectal suppository: 1 suppository(ies) rectal once a day as needed for Constipation  gabapentin 100 mg oral tablet: 1 tab(s) orally once a day  guaiFENesin 100 mg oral tablet: 100 milligram(s) orally once a day  traZODone 50 mg oral tablet: orally once a day (at bedtime)   acetaminophen 650 mg oral tablet, extended release: 1 tab(s) orally every 6 hours as needed for  mild pain  bisacodyl 10 mg rectal suppository: 1 suppository(ies) rectal once a day as needed for Constipation  gabapentin 100 mg oral tablet: 1 tab(s) orally once a day  guaiFENesin 100 mg oral tablet: 100 milligram(s) orally once a day  traZODone 50 mg oral tablet: orally once a day (at bedtime)

## 2024-02-09 NOTE — PROGRESS NOTE ADULT - PROBLEM SELECTOR PLAN 2
Patient was NPO prior to hospice  -patient on pleasure feeds at home  -follow up S+S if done  -family likely interested in hospice on discharge.
Patient was NPO prior to hospice  -plan for comfort feeds on hospice

## 2024-02-09 NOTE — PROGRESS NOTE ADULT - PROBLEM SELECTOR PLAN 3
Bedbound and nonverbal at baseline  -continue statin
Bedbound and nonverbal at baseline  -continue statin.

## 2024-02-09 NOTE — PROGRESS NOTE ADULT - ASSESSMENT
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 Patient just got out of Hospice. Sister stated that she found patient to be not responsive around 11am today. Called for EMS. Per EMS, when they arrived, patient was became alert and patients sister said she was more awake. However she was bradycardic in the field, brought to hospital for further evaluation.     Metabolic encephalopathy 2/2 symptomatic tracee- improved   - CTH:   Cerebral, cerebellar, and corpus callosal atrophy. Periventricular white matter hypodensities, nonspecific, differential diagnostic possibilities include ischemic change, gliosis or demyelination.  - No sign of infectious process  - f/u TSH ( wnl ), B12, Folate - pending   - f/u rEEG- diffuse electrocerebral dysfunction, no seizures   - EP consulted- no need for PPM , avoid AVN blockers   - Neuro consulted: MR brain- unremarkable for acute pathology   - palliative care consult: family agreeable to return to hospice   - Echo pending - family still wants despite plan for hospice     Aspiration  - Speech and swallow: recommended NPO last admission   - Aspiration precaution.   - pt never wanted PEG, NGT  - Family opted for pleasure feeds, which pt can do, but takes a long time to feed a small amt of food    History of CVA; Functional quadriplegia; Dysphagia; non-verbal  - Patient is bedbound  - off loading as possible    DLD- lipitor     Anxiety/Insomnia:  - c/w Home Trazodone 50 HS  - c/w Home Gabapentin 100 QD    #Progress Note Handoff  Pending (specify):  echocardiogram per family request and then dc home with hospice   DNR/DNI      Total time spent to complete patient's bedside assessment, review medical chart, discuss medical plan of care with covering medical team was more than 35 minutes  with >50% of time spent face to face with patient, discussion with patient/family and/or coordination of care. My note supersedes them medical resident note in case of discrepancy.      Marsha French, DO     
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 Sister stated that she found patient to be not responsive around 11am today. Called for EMS. Per EMS, when they arrived, patient was became alert and patients sister said she was more awake. Found to be Bradycardic    #Metabolic encephalopathy 2/2 symptomatic tracee vs Constipation vs Stroke vs hypothyroidism   - CTH:   1.  Cerebral, cerebellar, and corpus callosal atrophy.  2.  Periventricular white matter hypodensities, nonspecific, differential diagnostic possibilities include ischemic change, gliosis or demyelination.  - No sign of infectious process  - f/u TSH, B12, Folate   - c/w Dulcolax Supp until BM  - f/u rEEG  - f/u EP cs for tracee   - Keep atropine at bedside   - Neuro consult  - palliative care consult  - MRI ordered     #Aspiration  - Speech and swallow: recommended NPO last admission   - Aspiration precaution.   - pt never wanted PEG, NGT  - Family opted for pleasure feeds, which pt can do, but takes a long time to feed a small amt of food    # History of CVA; Functional quadriplegia; Dysphagia; non-verbal  - Patient is bedbound  - off loading as possible  - asa    #Hypothyroidism???  - f/u  TSH    #DLP- lipitor     #Anxiety/Insomnia:  - c/w Home Trazodone 50 HS  - c/w Home Gabapentin 100 QD    #Progress Note Handoff  Pending (specify):  follow up as above  Family discussion: house staff updated pt family  Disposition: home vs snf  Decision to admit the pt is based on acuity as above   DNR/DNI
73yFemale with history of CVA who is nonverbal at baseline, hypothyroidism, HLD brought in as unresponsive.  Patient had recently been on hospice but was decertified due to clinical improvement.  Patient admitted after being found unresponsive and was found to have AMS and bradycardia. Palliative care consulted for GOC.    Spoke with sister Tasha over the phone. Discussed that I spoke with hospice and Maria will likely be eligible again for hospice. She noted that she would like to pursue hospice for her again on discharge. We discussed the MRI was unrevealing, and if the goal is for hospice discharge in the next 1-2 days, we can likely discontinue additional aggressvie workup/tests. She expressed understanding and noted that she would speak with the primary team about the test she gets to the hospital.       MEDD (morphine equivalent daily dose):    Education about palliative care provided to patient/family.  See Recs below.    Please call [phone] with questions or concerns 24/7.   We will continue to follow.   
73yFemale with history of CVA who is nonverbal at baseline, hypothyroidism, HLD brought in as unresponsive.  Patient had recently been on hospice but was decertified due to clinical improvement.  Patient admitted after being found unresponsive and was found to have AMS and bradycardia. Palliative care consulted for GOC.    Plan for discharge today to hospice.      Education about palliative care provided to patient/family.  See Recs below.    Please call [phone] with questions or concerns 24/7.   We will continue to follow.

## 2024-02-09 NOTE — PROGRESS NOTE ADULT - SUBJECTIVE AND OBJECTIVE BOX
ANNA COBOS  73y, Female  Allergy: No Known Allergies    Hospital Day: 2d    Patient seen and examined earlier today. Resting comfortably, nonverbal.     PMH/PSH:  PAST MEDICAL & SURGICAL HISTORY:  Stroke      Hypothyroidism      Hypercholesteremia          LAST 24-Hr EVENTS:    VITALS:  T(F): 98.2 (02-08-24 @ 14:04), Max: 98.2 (02-08-24 @ 14:04)  HR: 53 (02-08-24 @ 14:04)  BP: 133/55 (02-08-24 @ 14:04) (107/51 - 133/55)  RR: 18 (02-08-24 @ 14:04)  SpO2: 95% (02-08-24 @ 10:00)        TESTS & MEASUREMENTS:  Weight (Kg):       02-07-24 @ 07:01  -  02-08-24 @ 07:00  --------------------------------------------------------  IN: 580 mL / OUT: 100 mL / NET: 480 mL    02-08-24 @ 07:01  -  02-08-24 @ 17:45  --------------------------------------------------------  IN: 80 mL / OUT: 100 mL / NET: -20 mL                            13.5   8.18  )-----------( 201      ( 08 Feb 2024 04:30 )             41.0       02-08    138  |  102  |  24<H>  ----------------------------<  104<H>  4.2   |  24  |  0.5<L>    Ca    9.4      08 Feb 2024 04:30    TPro  6.4  /  Alb  4.0  /  TBili  1.0  /  DBili  x   /  AST  16  /  ALT  10  /  AlkPhos  77  02-08    LIVER FUNCTIONS - ( 08 Feb 2024 04:30 )  Alb: 4.0 g/dL / Pro: 6.4 g/dL / ALK PHOS: 77 U/L / ALT: 10 U/L / AST: 16 U/L / GGT: x                 Urinalysis Basic - ( 08 Feb 2024 04:30 )    Color: x / Appearance: x / SG: x / pH: x  Gluc: 104 mg/dL / Ketone: x  / Bili: x / Urobili: x   Blood: x / Protein: x / Nitrite: x   Leuk Esterase: x / RBC: x / WBC x   Sq Epi: x / Non Sq Epi: x / Bacteria: x                  RADIOLOGY, ECG, & ADDITIONAL TESTS:      RECENT DIAGNOSTIC ORDERS:  TTE Echo Complete w/o Contrast w/ Doppler:   Transport: Stretcher-Crib  Monitor: w/o Monitor (02-08-24 @ 09:18)      MEDICATIONS:  MEDICATIONS  (STANDING):  atorvastatin 40 milliGRAM(s) Oral at bedtime  atropine Injectable 1 milliGRAM(s) IV Push once  bisacodyl Suppository 10 milliGRAM(s) Rectal daily  bisacodyl Suppository 10 milliGRAM(s) Rectal once  enoxaparin Injectable 40 milliGRAM(s) SubCutaneous every 24 hours  gabapentin 100 milliGRAM(s) Oral at bedtime  polyethylene glycol 3350 17 Gram(s) Oral daily    MEDICATIONS  (PRN):  acetaminophen     Tablet .. 650 milliGRAM(s) Oral every 6 hours PRN Mild Pain (1 - 3)      HOME MEDICATIONS:  gabapentin 100 mg oral tablet (02-06)  guaiFENesin 100 mg oral tablet (02-06)  traZODone 50 mg oral tablet (02-06)      PHYSICAL EXAM:  GENERAL: NAD  HNENT: EOMI, PERRLA    NECK: No LAD/swelling  CHEST/LUNG:  CTAB no wheezes/rales/ronchi  HEART: RRR, No murmurs  ABDOMEN: Soft, NT, ND,  Bowel sounds present  EXTREMITIES:  Warm well perfused, no edema       
Patient is a 73y old  Female who presents with a chief complaint of     Pt seen and examined at bedside. unable to get ROS         PAST MEDICAL & SURGICAL HISTORY:  Stroke    Hypothyroidism    Hypercholesteremia    VITAL SIGNS (Last 24 hrs):  T(C): 36.1 (02-07-24 @ 09:35), Max: 36.9 (02-07-24 @ 06:22)  HR: 56 (02-07-24 @ 09:35) (50 - 63)  BP: 111/53 (02-07-24 @ 09:35) (92/54 - 126/60)  RR: 20 (02-07-24 @ 09:35) (18 - 20)  SpO2: 100% (02-07-24 @ 09:35) (97% - 100%)      PHYSICAL EXAM:  GENERAL: NAD, well-developed  HEAD:  Atraumatic, Normocephalic  EYES: EOMI, PERRLA, conjunctiva and sclera clear  NECK: Supple, No JVD  CHEST/LUNG: Clear to auscultation bilaterally; No wheeze  HEART: Regular rate and rhythm; No murmurs, rubs, or gallops  ABDOMEN: Soft, Nontender, Nondistended; Bowel sounds present  EXTREMITIES:  2+ Peripheral Pulses, No clubbing, cyanosis, or edema  PSYCH: AAOx3  NEUROLOGY: non-focal  SKIN: No rashes or lesions    Labs Reviewed  Spoke to patient in regards to abnormal labs.    CBC Full  -  ( 06 Feb 2024 13:03 )  WBC Count : 5.13 K/uL  Hemoglobin : 13.2 g/dL  Hematocrit : 40.8 %  Platelet Count - Automated : 228 K/uL  Mean Cell Volume : 93.4 fL  Mean Cell Hemoglobin : 30.2 pg  Mean Cell Hemoglobin Concentration : 32.4 g/dL  Auto Neutrophil # : 2.38 K/uL  Auto Lymphocyte # : 2.20 K/uL  Auto Monocyte # : 0.36 K/uL  Auto Eosinophil # : 0.13 K/uL  Auto Basophil # : 0.03 K/uL  Auto Neutrophil % : 46.4 %  Auto Lymphocyte % : 42.9 %  Auto Monocyte % : 7.0 %  Auto Eosinophil % : 2.5 %  Auto Basophil % : 0.6 %    BMP:    02-06 @ 13:03    Blood Urea Nitrogen - 17  Calcium - 9.3  Carbond Dioxide - 28  Chloride - 103  Creatinine - 0.5  Glucose - 97  Potassium - 4.7  Sodium - 141      Hemoglobin A1c -     Urine Culture:        COVID Labs  CRP:      D-Dimer:        Imaging reviewed independently and reviewed read  < from: Xray Chest 1 View AP/PA (02.06.24 @ 14:11) >  Impression:    No radiographic evidence of acute cardiopulmonary disease.    < end of copied text >      < from: CT Head No Cont (02.06.24 @ 13:34) >  IMPRESSION:    1.  Cerebral, cerebellar, and corpus callosal atrophy.    2.  Periventricular white matter hypodensities, nonspecific, differential   diagnostic possibilities include ischemic change, gliosis or   demyelination.    < end of copied text >    MEDICATIONS  (STANDING):  atropine Injectable 1 milliGRAM(s) IV Push once  bisacodyl Suppository 10 milliGRAM(s) Rectal daily  bisacodyl Suppository 10 milliGRAM(s) Rectal once  enoxaparin Injectable 40 milliGRAM(s) SubCutaneous every 24 hours  gabapentin 100 milliGRAM(s) Oral at bedtime  polyethylene glycol 3350 17 Gram(s) Oral daily    MEDICATIONS  (PRN):  acetaminophen     Tablet .. 650 milliGRAM(s) Oral every 6 hours PRN Mild Pain (1 - 3)      
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 Patient just got out of Hospice. Sister stated that she found patient to be not responsive around 11am today. Called for EMS. Per EMS, when they arrived, patient was became alert and patients sister said she was more awake. However she was bradycardic in the field. Sister and niece at bedside states no intubation and no CPR. However they are agreeable with TVP and medications.    Interval history  -Patient seen at bedside  -She is alert and awake, but unable to participate in exam     ADVANCE DIRECTIVES:     [ ] Full Code [ x] DNR  MOLST  [ ]  Living Will  [ ]   DECISION MAKER(s):  [ ] Health Care Proxy(s)  [ x] Surrogate(s)  [ ] Guardian           Name(s): Phone Number(s):  Tasha      BASELINE (I)ADL(s) (prior to admission):    [place]: [ ]Total  [ ] Moderate [ ]Dependent  Palliative Performance Status Version 2:         %    http://npcrc.org/files/news/palliative_performance_scale_ppsv2.pdf    Allergies    No Known Allergies    Intolerances    MEDICATIONS  (STANDING):  atorvastatin 40 milliGRAM(s) Oral at bedtime  atropine Injectable 1 milliGRAM(s) IV Push once  bisacodyl Suppository 10 milliGRAM(s) Rectal daily  bisacodyl Suppository 10 milliGRAM(s) Rectal once  enoxaparin Injectable 40 milliGRAM(s) SubCutaneous every 24 hours  gabapentin 100 milliGRAM(s) Oral at bedtime  polyethylene glycol 3350 17 Gram(s) Oral daily    MEDICATIONS  (PRN):  acetaminophen     Tablet .. 650 milliGRAM(s) Oral every 6 hours PRN Mild Pain (1 - 3)      PRESENT SYMPTOMS: [x]Unable to obtain due to poor mentation   Source if other than patient:  [ ]Family   [ ]Team     Pain: [ ]yes [ ]no  QOL impact -   Location -    Aggravating factors -   Quality -   Radiation -   Timing-  Severity (0-10 scale):  Minimal acceptable level (0-10 scale):     CPOT:  0  https://www.[facility].org/getattachment/[email or web address]/Critical-Care-Pain-Observation-Tool-(CPOT)    PAIN AD Score:   http://geriatrictoolkit.[facility]/cog/painad.pdf (press ctrl +  left click to view)    Dyspnea:                           [ ]None[ ]Mild [ ]Moderate [ ]Severe     Respiratory Distress Observation Scale (RDOS): 0  A score of 0 to 2 signifies little or no respiratory distress, 3 signifies mild distress, scores 4 to 6 indicate moderate distress, and scores greater than 7 signify severe distress  https://www.[place].ca/sites/default/files/PDFS/758115-rnxistrrqzv-pwoarhsk-rejsthlzozy-okhvj.pdf    Anxiety:                             [ ]None[ ]Mild [ ]Moderate [ ]Severe   Fatigue:                             [ ]None[ ]Mild [ ]Moderate [ ]Severe   Nausea:                             [ ]None[ ]Mild [ ]Moderate [ ]Severe   Loss of appetite:              [ ]None[ ]Mild [ ]Moderate [ ]Severe   Constipation:                    [ ]None[ ]Mild [ ]Moderate [ ]Severe    Other Symptoms:  [ ]All other review of systems negative     Palliative Performance Status Version 2:         30%    http://[facility].org/files/news/palliative_performance_scale_ppsv2.pdf    PHYSICAL EXAM:  Vital Signs Last 24 Hrs  T(C): 36.8 (08 Feb 2024 14:04), Max: 36.8 (08 Feb 2024 14:04)  T(F): 98.2 (08 Feb 2024 14:04), Max: 98.2 (08 Feb 2024 14:04)  HR: 53 (08 Feb 2024 14:04) (53 - 81)  BP: 133/55 (08 Feb 2024 14:04) (107/51 - 133/55)  BP(mean): 71 (08 Feb 2024 04:13) (71 - 71)  RR: 18 (08 Feb 2024 14:04) (18 - 20)  SpO2: 95% (08 Feb 2024 10:00) (95% - 95%)    Parameters below as of 08 Feb 2024 10:00  Patient On (Oxygen Delivery Method): room air      GENERAL:  [ ] No acute distress [ ]Lethargic  [ ]Unarousable  [ ]Verbal  [x ]Non-Verbal [ ]Cachexia    BEHAVIORAL/PSYCH:  [ ]Alert and Oriented x  [ ] Anxiety [ ] Delirium [ ] Agitation [ x] Calm   EYES: [x ] No scleral icterus [ ] Scleral icterus [ ] Closed  ENMT:  [ ]Dry mouth  [x ]No external oral lesions [ ] No external ear or nose lesions  CARDIOVASCULAR:  [ ]Regular [ ]Irregular [ ]Tachy [ x]Not Tachy  [ ]Stewart [ ] Edema [ ] No edema  PULMONARY:  [ ]Tachypnea  [ ]Audible excessive secretions [ x] No labored breathing [ ] labored breathing  GASTROINTESTINAL: [ ]Soft  [ ]Distended  [ ]Not distended [ ]Non tender [ ]Tender  MUSCULOSKELETAL: [ ]No clubbing [ ] clubbing  [x ] No cyanosis [ ] cyanosis  NEUROLOGIC: [ ]No focal deficits  [ ]Follows commands  [x ]Does not follow commands  [ ]Cognitive impairment  [ ]Dysphagia  [ ]Dysarthria  [ ]Paresis   SKIN: [ ] Jaundiced [ x] Non-jaundiced [ ]Rash [ ]No Rash [ ] Warm [ ] Dry  MISC/LINES: [ ] ET tube [ ] Trach [ ]NGT/OGT [ ]PEG [ ]Pineda    LABS: reviewed by me                                   13.5   8.18  )-----------( 201      ( 08 Feb 2024 04:30 )             41.0       02-08    138  |  102  |  24<H>  ----------------------------<  104<H>  4.2   |  24  |  0.5<L>    Ca    9.4      08 Feb 2024 04:30    TPro  6.4  /  Alb  4.0  /  TBili  1.0  /  DBili  x   /  AST  16  /  ALT  10  /  AlkPhos  77  02-08              Urinalysis Basic - ( 08 Feb 2024 04:30 )    Color: x / Appearance: x / SG: x / pH: x  Gluc: 104 mg/dL / Ketone: x  / Bili: x / Urobili: x   Blood: x / Protein: x / Nitrite: x   Leuk Esterase: x / RBC: x / WBC x   Sq Epi: x / Non Sq Epi: x / Bacteria: x                  CAPILLARY BLOOD GLUCOSE                  RADIOLOGY & ADDITIONAL STUDIES: reviewed by me    < from: MR Head No Cont (02.07.24 @ 18:28) >  IMPRESSION:    1.  Study isdegraded by motion significantly    2.  There is no definite evidence for restricted diffusion to suggest   recent infarction    3.  Enlarged ventricles relative to sulci, correlate.    < end of copied text >      EKG: reviewed by me    < from: 12 Lead ECG (02.06.24 @ 12:30) >  Ventricular Rate 43 BPM    Atrial Rate 43 BPM    P-R Interval 168 ms    QRS Duration 74 ms    Q-T Interval 486 ms    QTC Calculation(Bazett) 410 ms    P Axis 79 degrees    R Axis 72 degrees    T Axis 67 degrees    Diagnosis Line Marked sinus bradycardia  Abnormal ECG    < end of copied text >      PROTEIN CALORIE MALNUTRITION PRESENT: [ ]mild [ ]moderate [ ]severe [ ]underweight [ ]morbid obesity  https://www.andeal.org/vault/2440/web/files/ONC/Table_Clinical%20Characteristics%20to%20Document%20Malnutrition-White%20JV%20et%20al%413767.pdf    Height (cm): 149.9 (08-15-23 @ 10:18)  Weight (kg): 53.7 (02-07-24 @ 10:19), 59 (08-14-23 @ 13:18)  BMI (kg/m2): 23.9 (02-07-24 @ 10:19), 26.3 (08-15-23 @ 10:18)  [ ]PPSV2 < or = to 30% [ ]significant weight loss  [ ]poor nutritional intake  [ ]anasarca      [ ]Artificial Nutrition      Palliative Care Spiritual/Emotional Screening Tool Question  Severity (0-4):                    OR                    [ x] Unable to determine. Will assess at later time if appropriate.  Score of 2 or greater indicates recommendation of Chaplaincy and/or SW referral  Chaplaincy Referral: [ ] Yes [ ] Refused [ ] Following     Caregiver [place]:  [ ] Yes [ ] No    OR    [x ] Unable to determine. Will assess at later time if appropriate.  Social Work Referral [ ]  Patient and Family Centered Care Referral [ ]    Anticipatory Grief Present: [ ] Yes [ ] No    OR     [ x] Unable to determine. Will assess at later time if appropriate.  Social Work Referral [ ]  Patient and Family Centered Care Referral [ ]    Patient discussed with primary medical team MD  Palliative care education provided to patient and/or family  
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 Patient just got out of Hospice. Sister stated that she found patient to be not responsive around 11am today. Called for EMS. Per EMS, when they arrived, patient was became alert and patients sister said she was more awake. However she was bradycardic in the field. Sister and niece at bedside states no intubation and no CPR. However they are agreeable with TVP and medications.    Interval history  -Patient seen at bedside  -She is alert and awake, but unable to participate in exam     ADVANCE DIRECTIVES:     [ ] Full Code [ x] DNR  MOLST  [ ]  Living Will  [ ]   DECISION MAKER(s):  [ ] Health Care Proxy(s)  [ x] Surrogate(s)  [ ] Guardian           Name(s): Phone Number(s):  Tasha      BASELINE (I)ADL(s) (prior to admission):    [place]: [ ]Total  [ ] Moderate [ ]Dependent  Palliative Performance Status Version 2:         %    http://npcrc.org/files/news/palliative_performance_scale_ppsv2.pdf    Allergies    No Known Allergies    Intolerances    MEDICATIONS  (STANDING):  atorvastatin 40 milliGRAM(s) Oral at bedtime  atropine Injectable 1 milliGRAM(s) IV Push once  bisacodyl Suppository 10 milliGRAM(s) Rectal once  bisacodyl Suppository 10 milliGRAM(s) Rectal daily  enoxaparin Injectable 40 milliGRAM(s) SubCutaneous every 24 hours  gabapentin 100 milliGRAM(s) Oral at bedtime  polyethylene glycol 3350 17 Gram(s) Oral daily    MEDICATIONS  (PRN):  acetaminophen     Tablet .. 650 milliGRAM(s) Oral every 6 hours PRN Mild Pain (1 - 3)        PRESENT SYMPTOMS: [x]Unable to obtain due to poor mentation   Source if other than patient:  [ ]Family   [ ]Team     Pain: [ ]yes [ ]no  QOL impact -   Location -    Aggravating factors -   Quality -   Radiation -   Timing-  Severity (0-10 scale):  Minimal acceptable level (0-10 scale):     CPOT:  0  https://www.[facility].org/getattachment/[email or web address]/Critical-Care-Pain-Observation-Tool-(CPOT)    PAIN AD Score:   http://geriatrictoolkit.[facility]/cog/painad.pdf (press ctrl +  left click to view)    Dyspnea:                           [ ]None[ ]Mild [ ]Moderate [ ]Severe     Respiratory Distress Observation Scale (RDOS): 0  A score of 0 to 2 signifies little or no respiratory distress, 3 signifies mild distress, scores 4 to 6 indicate moderate distress, and scores greater than 7 signify severe distress  https://www.[facility].ca/sites/default/files/PDFS/235443-qaycxwitdbn-gpomuqlp-wyykkhyrkpb-hrjii.pdf    Anxiety:                             [ ]None[ ]Mild [ ]Moderate [ ]Severe   Fatigue:                             [ ]None[ ]Mild [ ]Moderate [ ]Severe   Nausea:                             [ ]None[ ]Mild [ ]Moderate [ ]Severe   Loss of appetite:              [ ]None[ ]Mild [ ]Moderate [ ]Severe   Constipation:                    [ ]None[ ]Mild [ ]Moderate [ ]Severe    Other Symptoms:  [ ]All other review of systems negative     Palliative Performance Status Version 2:         30%    http://[facility].org/files/news/palliative_performance_scale_ppsv2.pdf    PHYSICAL EXAM:  Vital Signs Last 24 Hrs  T(C): 37.1 (09 Feb 2024 12:07), Max: 37.1 (09 Feb 2024 12:07)  T(F): 98.8 (09 Feb 2024 12:07), Max: 98.8 (09 Feb 2024 12:07)  HR: 68 (09 Feb 2024 15:23) (68 - 83)  BP: 110/57 (09 Feb 2024 15:23) (109/58 - 135/73)  BP(mean): 80 (09 Feb 2024 15:23) (80 - 80)  RR: 18 (09 Feb 2024 15:23) (18 - 18)  SpO2: 95% (09 Feb 2024 15:23) (95% - 95%)    Parameters below as of 09 Feb 2024 15:23  Patient On (Oxygen Delivery Method): room air    GENERAL:  [ ] No acute distress [ ]Lethargic  [ ]Unarousable  [ ]Verbal  [x ]Non-Verbal [ ]Cachexia    BEHAVIORAL/PSYCH:  [ ]Alert and Oriented x  [ ] Anxiety [ ] Delirium [ ] Agitation [ x] Calm   EYES: [x ] No scleral icterus [ ] Scleral icterus [ ] Closed  ENMT:  [ ]Dry mouth  [x ]No external oral lesions [ ] No external ear or nose lesions  CARDIOVASCULAR:  [ ]Regular [ ]Irregular [ ]Tachy [ x]Not Tachy  [ ]Stewart [ ] Edema [ ] No edema  PULMONARY:  [ ]Tachypnea  [ ]Audible excessive secretions [ x] No labored breathing [ ] labored breathing  GASTROINTESTINAL: [ ]Soft  [ ]Distended  [ ]Not distended [ ]Non tender [ ]Tender  MUSCULOSKELETAL: [ ]No clubbing [ ] clubbing  [x ] No cyanosis [ ] cyanosis  NEUROLOGIC: [ ]No focal deficits  [ ]Follows commands  [x ]Does not follow commands  [ ]Cognitive impairment  [ ]Dysphagia  [ ]Dysarthria  [ ]Paresis   SKIN: [ ] Jaundiced [ x] Non-jaundiced [ ]Rash [ ]No Rash [ ] Warm [ ] Dry  MISC/LINES: [ ] ET tube [ ] Trach [ ]NGT/OGT [ ]PEG [ ]Pineda    LABS: reviewed by me                                   13.5   8.18  )-----------( 201      ( 08 Feb 2024 04:30 )             41.0       02-08    138  |  102  |  24<H>  ----------------------------<  104<H>  4.2   |  24  |  0.5<L>    Ca    9.4      08 Feb 2024 04:30    TPro  6.4  /  Alb  4.0  /  TBili  1.0  /  DBili  x   /  AST  16  /  ALT  10  /  AlkPhos  77  02-08              Urinalysis Basic - ( 08 Feb 2024 04:30 )    Color: x / Appearance: x / SG: x / pH: x  Gluc: 104 mg/dL / Ketone: x  / Bili: x / Urobili: x   Blood: x / Protein: x / Nitrite: x   Leuk Esterase: x / RBC: x / WBC x   Sq Epi: x / Non Sq Epi: x / Bacteria: x                  CAPILLARY BLOOD GLUCOSE                  RADIOLOGY & ADDITIONAL STUDIES: reviewed by me    < from: MR Head No Cont (02.07.24 @ 18:28) >  IMPRESSION:    1.  Study isdegraded by motion significantly    2.  There is no definite evidence for restricted diffusion to suggest   recent infarction    3.  Enlarged ventricles relative to sulci, correlate.    < end of copied text >      EKG: reviewed by me    < from: 12 Lead ECG (02.06.24 @ 12:30) >  Ventricular Rate 43 BPM    Atrial Rate 43 BPM    P-R Interval 168 ms    QRS Duration 74 ms    Q-T Interval 486 ms    QTC Calculation(Bazett) 410 ms    P Axis 79 degrees    R Axis 72 degrees    T Axis 67 degrees    Diagnosis Line Marked sinus bradycardia  Abnormal ECG    < end of copied text >      PROTEIN CALORIE MALNUTRITION PRESENT: [ ]mild [ ]moderate [ ]severe [ ]underweight [ ]morbid obesity  https://www.andeal.org/vault/2440/web/files/ONC/Table_Clinical%20Characteristics%20to%20Document%20Malnutrition-White%20JV%20et%20al%478784.pdf    Height (cm): 149.9 (08-15-23 @ 10:18)  Weight (kg): 53.7 (02-07-24 @ 10:19), 59 (08-14-23 @ 13:18)  BMI (kg/m2): 23.9 (02-07-24 @ 10:19), 26.3 (08-15-23 @ 10:18)  [ ]PPSV2 < or = to 30% [ ]significant weight loss  [ ]poor nutritional intake  [ ]anasarca      [ ]Artificial Nutrition      Palliative Care Spiritual/Emotional Screening Tool Question  Severity (0-4):                    OR                    [ x] Unable to determine. Will assess at later time if appropriate.  Score of 2 or greater indicates recommendation of Chaplaincy and/or SW referral  Chaplaincy Referral: [ ] Yes [ ] Refused [ ] Following     Caregiver [place]:  [ ] Yes [ ] No    OR    [x ] Unable to determine. Will assess at later time if appropriate.  Social Work Referral [ ]  Patient and Family Centered Care Referral [ ]    Anticipatory Grief Present: [ ] Yes [ ] No    OR     [ x] Unable to determine. Will assess at later time if appropriate.  Social Work Referral [ ]  Patient and Family Centered Care Referral [ ]    Patient discussed with primary medical team MD  Palliative care education provided to patient and/or family

## 2024-02-09 NOTE — DISCHARGE NOTE NURSING/CASE MANAGEMENT/SOCIAL WORK - PATIENT PORTAL LINK FT
You can access the FollowMyHealth Patient Portal offered by [facility] by registering at the following website: [email or web address]. By joining [employer]’s FollowMyHealth portal, you will also be able to view your health information using other applications (apps) compatible with our system.

## 2024-02-09 NOTE — PROGRESS NOTE ADULT - PROBLEM SELECTOR PLAN 4
-DNR/DNI  -plan for hospice on discharge in next 24 hours
-DNR/DNI  -ongoing medical management - family considering foregoing additional workup as plan for hospice in next 1-2 days  -interested in hospice on discharge.

## 2024-02-09 NOTE — DISCHARGE NOTE PROVIDER - HOSPITAL COURSE
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 Patient just got out of Hospice. Sister stated that she found patient to be not responsive around 11am today. Called for EMS. Per EMS, when they arrived, patient was became alert and patients sister said she was more awake. However she was bradycardic in the field. Sister and niece at bedside states no intubation and no CPR. However they are agreeable with TVP and medications.    As per sister: Baseline is non verbal, bedbound, opens her eyes to verbal stimuli, follows simple command (ex: lift your left arm if you want ..), eats pureed food although she aspirates as family opted for comfort feeding as refused PEG tube and NGT in the past)     Metabolic encephalopathy 2/2 symptomatic tracee- improved   - CTH:   Cerebral, cerebellar, and corpus callosal atrophy. Periventricular white matter hypodensities, nonspecific, differential diagnostic possibilities include ischemic change, gliosis or demyelination.  - No sign of infectious process  - f/u TSH ( wnl ), B12, Folate - pending   - f/u rEEG- diffuse electrocerebral dysfunction, no seizures   - EP consulted- no need for PPM , avoid AVN blockers   - Neuro consulted: MR brain- unremarkable for acute pathology   - palliative care consult: family agreeable to return to hospice   - Echo pending - family still wants despite plan for hospice     Aspiration  - Speech and swallow: recommended NPO last admission   - Aspiration precaution.   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 Patient just got out of Hospice. Sister stated that she found patient to be not responsive around 11am today. Called for EMS. Per EMS, when they arrived, patient was became alert and patients sister said she was more awake. However she was bradycardic in the field. Sister and niece at bedside states no intubation and no CPR. However they are agreeable with TVP and medications.    As per sister: Baseline is non verbal, bedbound, opens her eyes to verbal stimuli, follows simple command (ex: lift your left arm if you want ..), eats pureed food although she aspirates as family opted for comfort feeding as refused PEG tube and NGT in the past)     Metabolic encephalopathy 2/2 symptomatic tracee- improved   - CTH:   Cerebral, cerebellar, and corpus callosal atrophy. Periventricular white matter hypodensities, nonspecific, differential diagnostic possibilities include ischemic change, gliosis or demyelination.  - No sign of infectious process  - f/u TSH ( wnl ), B12, Folate - pending   - f/u rEEG- diffuse electrocerebral dysfunction, no seizures   - EP consulted- no need for PPM , avoid AVN blockers   - Neuro consulted: MR brain- unremarkable for acute pathology   - palliative care consult: family agreeable to return to hospice   - Echo unremarkable     Aspiration  - Speech and swallow: recommended NPO last admission   - Aspiration precaution.   - pt never wanted PEG, NGT  - Family opted for pleasure feeds, which pt can do, but takes a long time to feed a small amt of food    History of CVA; Functional quadriplegia; Dysphagia; non-verbal  - Patient is bedbound  - off loading as possible    Anxiety/Insomnia:  - c/w Home Trazodone 50 HS  - c/w Home Gabapentin 100 QD

## 2024-02-09 NOTE — DISCHARGE NOTE PROVIDER - ATTENDING DISCHARGE PHYSICAL EXAMINATION:
GENERAL: NAD  HNENT: EOMI, PERRLA    NECK: No LAD/swelling  CHEST/LUNG:  CTAB no wheezes/rales/ronchi  HEART: RRR, No murmurs  ABDOMEN: Soft, NT, ND,  Bowel sounds present  EXTREMITIES:  Warm well perfused, no edema

## 2024-02-09 NOTE — DISCHARGE NOTE PROVIDER - NSDCCPCAREPLAN_GEN_ALL_CORE_FT
PRINCIPAL DISCHARGE DIAGNOSIS  Diagnosis: Bradycardia  Assessment and Plan of Treatment: You came in with bradycardia, and altered mental status. We did an MRI which was unremarkable for any acute changes.   You are being discharged with hospice services.  Please follow with the hospice care team.      SECONDARY DISCHARGE DIAGNOSES  Diagnosis: Bradycardia  Assessment and Plan of Treatment:     Diagnosis: Elevated troponin  Assessment and Plan of Treatment:      PRINCIPAL DISCHARGE DIAGNOSIS  Diagnosis: Bradycardia  Assessment and Plan of Treatment: You came in with bradycardia, and altered mental status. We did an MRI which was unremarkable for any acute changes. We did an echocardiogram which was also normal.   You are being discharged with hospice services.  Please follow with the hospice care team.      SECONDARY DISCHARGE DIAGNOSES  Diagnosis: Bradycardia  Assessment and Plan of Treatment:     Diagnosis: Elevated troponin  Assessment and Plan of Treatment:

## 2024-02-09 NOTE — PROGRESS NOTE ADULT - PROBLEM SELECTOR PLAN 1
Nonverbal at baseline. Patient unresponsive at home, improved here. Possible 2/2 bradycardia vs aspiration vs constipation other neurological causes  -follow up workup, EEG, neuro, cardiology  -family considering foregoing additional workup due to plan for hospice  -treatment of constipation    Recommend non-pharmacological interventions to prevent/treat delirium  - maintain day/night light cycles  - optimize sleep-wake cycle, minimize environmental noise  - reorientation frequently  - use verbal redirection as first line  - minimize restraints and lines  - ensure good bladder/bowel function  - ensure adequate pain control  - minimize use of anticholinergic, antihistaminic, and benzodiazepine medications.
Nonverbal at baseline. Patient unresponsive at home, improved here. Possible 2/2 bradycardia vs aspiration vs constipation other neurological causes  -plan for discharge home on hospice today  -treatment of constipation    Recommend non-pharmacological interventions to prevent/treat delirium  - maintain day/night light cycles  - optimize sleep-wake cycle, minimize environmental noise  - reorientation frequently  - use verbal redirection as first line  - minimize restraints and lines  - ensure good bladder/bowel function  - ensure adequate pain control  - minimize use of anticholinergic, antihistaminic, and benzodiazepine medications.

## 2024-02-09 NOTE — PROGRESS NOTE ADULT - TIME BILLING
Time above includes time spent preparing to see the patient, obtaining and/or reviewing separately obtained history, performing a medically appropriate examination and/or evaluation, counseling and educating the patient/family/caregiver, referring and communicating with other health care professionals (when not separately reported), documenting clinical information in the electronic or other health record, independently interpreting results (not separately reported) and communicating results to the patient/family/caregiver, and/or care coordination (not separately reported).
Time above includes time spent preparing to see the patient, obtaining and/or reviewing separately obtained history, performing a medically appropriate examination and/or evaluation, counseling and educating the patient/family/caregiver, referring and communicating with other health care professionals (when not separately reported), documenting clinical information in the electronic or other health record, independently interpreting results (not separately reported) and communicating results to the patient/family/caregiver, and/or care coordination (not separately reported).

## 2024-02-15 DIAGNOSIS — R00.1 BRADYCARDIA, UNSPECIFIED: ICD-10-CM

## 2024-02-15 DIAGNOSIS — E78.00 PURE HYPERCHOLESTEROLEMIA, UNSPECIFIED: ICD-10-CM

## 2024-02-15 DIAGNOSIS — F41.9 ANXIETY DISORDER, UNSPECIFIED: ICD-10-CM

## 2024-02-15 DIAGNOSIS — I69.328 OTHER SPEECH AND LANGUAGE DEFICITS FOLLOWING CEREBRAL INFARCTION: ICD-10-CM

## 2024-02-15 DIAGNOSIS — R53.2 FUNCTIONAL QUADRIPLEGIA: ICD-10-CM

## 2024-02-15 DIAGNOSIS — I69.391 DYSPHAGIA FOLLOWING CEREBRAL INFARCTION: ICD-10-CM

## 2024-02-15 DIAGNOSIS — R79.89 OTHER SPECIFIED ABNORMAL FINDINGS OF BLOOD CHEMISTRY: ICD-10-CM

## 2024-02-15 DIAGNOSIS — Z51.5 ENCOUNTER FOR PALLIATIVE CARE: ICD-10-CM

## 2024-02-15 DIAGNOSIS — G93.41 METABOLIC ENCEPHALOPATHY: ICD-10-CM

## 2024-02-15 DIAGNOSIS — Z53.8 PROCEDURE AND TREATMENT NOT CARRIED OUT FOR OTHER REASONS: ICD-10-CM

## 2024-02-15 DIAGNOSIS — G47.00 INSOMNIA, UNSPECIFIED: ICD-10-CM

## 2024-02-15 DIAGNOSIS — E03.9 HYPOTHYROIDISM, UNSPECIFIED: ICD-10-CM

## 2024-02-15 DIAGNOSIS — Z91.89 OTHER SPECIFIED PERSONAL RISK FACTORS, NOT ELSEWHERE CLASSIFIED: ICD-10-CM

## 2024-02-15 DIAGNOSIS — Z66 DO NOT RESUSCITATE: ICD-10-CM

## 2024-02-15 DIAGNOSIS — I69.351 HEMIPLEGIA AND HEMIPARESIS FOLLOWING CEREBRAL INFARCTION AFFECTING RIGHT DOMINANT SIDE: ICD-10-CM

## 2024-02-15 DIAGNOSIS — Z74.01 BED CONFINEMENT STATUS: ICD-10-CM

## 2024-02-15 DIAGNOSIS — Z11.52 ENCOUNTER FOR SCREENING FOR COVID-19: ICD-10-CM

## 2024-05-20 RX ORDER — PSEUDOEPHEDRINE HCL 30 MG
1 TABLET ORAL
Refills: 0 | DISCHARGE

## 2024-05-20 RX ORDER — GABAPENTIN 400 MG/1
1 CAPSULE ORAL
Refills: 0 | DISCHARGE

## 2024-05-20 RX ORDER — ATORVASTATIN CALCIUM 80 MG/1
1 TABLET, FILM COATED ORAL
Refills: 0 | DISCHARGE

## 2024-05-20 RX ORDER — MEMANTINE HYDROCHLORIDE 10 MG/1
1 TABLET ORAL
Refills: 0 | DISCHARGE

## 2024-05-20 RX ORDER — TRAZODONE HCL 50 MG
0 TABLET ORAL
Refills: 0 | DISCHARGE

## 2025-03-25 NOTE — ED ADULT TRIAGE NOTE - HISTORY OF COVID-19 VACCINATION
Quality 410: Psoriasis Clinical Response To Oral Systemic Or Biologic Medications: Psoriasis Assessment Tool Documented, Met Specified Benchmark
Detail Level: Detailed
Yes

## 2025-06-20 NOTE — PROGRESS NOTE ADULT - SUBJECTIVE AND OBJECTIVE BOX
Hospital Day:  8d    Chief Complaint: Patient is a 70y old  Female who presents with a chief complaint of Constipation (29 Nov 2021 12:56)    24 hour events: No acute overnight events.     Past Medical Hx:   CVA (cerebral vascular accident)    HTN (hypertension)      Past Sx:  No significant past surgical history      Allergies:  No Known Allergies    Current Meds:   Standing Meds:  atorvastatin 20 milliGRAM(s) Oral at bedtime  enoxaparin Injectable 40 milliGRAM(s) SubCutaneous daily  gabapentin 300 milliGRAM(s) Oral daily  PARoxetine 40 milliGRAM(s) Oral daily    PRN Meds:  guaifenesin/dextromethorphan Oral Liquid 10 milliLiter(s) Oral every 6 hours PRN cough and/or congestion    HOME MEDICATIONS:  atorvastatin 20 mg oral tablet: 1 tab(s) orally once a day  Benadryl 50 mg oral capsule: 1 cap(s) orally once (at bedtime)  gabapentin 300 mg oral capsule: 1 cap(s) orally once a day  PARoxetine 40 mg oral tablet: 1 tab(s) orally once a day      Vital Signs:   T(F): 96.2 (11-29-21 @ 06:00), Max: 97.3 (11-28-21 @ 20:52)  HR: 67 (11-29-21 @ 06:00) (67 - 86)  BP: 133/63 (11-29-21 @ 06:00) (100/60 - 133/63)  RR: 20 (11-29-21 @ 06:00) (18 - 20)  SpO2: --        Physical Exam:   GENERAL: NAD  HEENT: NCAT  CHEST/LUNG: audible bilateral bs   HEART: Regular rate and rhythm   ABDOMEN: Soft, Nontender, Nondistended abdomen   EXTREMITIES: No LE edema b/l  NERVOUS SYSTEM:  Alert & Oriented X3  LINES/CATHETERS: peripheral         Labs:                         13.6   5.69  )-----------( 245      ( 29 Nov 2021 06:22 )             41.9     Neutophil% 54.7, Lymphocyte% 34.6, Monocyte% 8.1, Bands% 0.2 11-29-21 @ 06:22    29 Nov 2021 06:22    140    |  104    |  16     ----------------------------<  108    4.5     |  21     |  0.6      Ca    9.3        29 Nov 2021 06:22  Mg     1.8       29 Nov 2021 06:22    TPro  6.5    /  Alb  3.9    /  TBili  0.7    /  DBili  x      /  AST  27     /  ALT  26     /  AlkPhos  88     29 Nov 2021 06:22      Radiology:   < from: CT Abdomen and Pelvis w/ IV Cont (11.21.21 @ 22:21) >  IMPRESSION:  No evidence of acute intra-abdominal pathology.    --- End of Report ---    < end of copied text >   Admission

## 2025-07-25 NOTE — PATIENT PROFILE ADULT - NSPROHMSYMPCOND_GEN_A_NUR
[FreeTextEntry1] : A portion of this note was written by [Trever Castillo] on 07/23/2025 acting as a scribe for Dr. Jonas.   I have personally reviewed the chart and agree that the record accurately reflects my personal performance of the history, physical exam, assessment, and plan.  cardiovascular/gastrointestinal/immunologic/musculoskeletal/respiratory